# Patient Record
Sex: FEMALE | Race: BLACK OR AFRICAN AMERICAN | Employment: OTHER | URBAN - METROPOLITAN AREA
[De-identification: names, ages, dates, MRNs, and addresses within clinical notes are randomized per-mention and may not be internally consistent; named-entity substitution may affect disease eponyms.]

---

## 2019-02-14 ENCOUNTER — HOSPITAL ENCOUNTER (EMERGENCY)
Age: 74
Discharge: HOME | End: 2019-02-14
Attending: EMERGENCY MEDICINE
Payer: COMMERCIAL

## 2019-02-14 ENCOUNTER — APPOINTMENT (OUTPATIENT)
Dept: GENERAL RADIOLOGY | Age: 74
End: 2019-02-14
Payer: COMMERCIAL

## 2019-02-14 VITALS
HEART RATE: 97 BPM | DIASTOLIC BLOOD PRESSURE: 89 MMHG | TEMPERATURE: 97 F | SYSTOLIC BLOOD PRESSURE: 154 MMHG | RESPIRATION RATE: 18 BRPM | OXYGEN SATURATION: 100 %

## 2019-02-14 DIAGNOSIS — S92.502A CLOSED FRACTURE OF PHALANX OF LEFT FIFTH TOE, INITIAL ENCOUNTER: Primary | ICD-10-CM

## 2019-02-14 PROCEDURE — 99283 EMERGENCY DEPT VISIT LOW MDM: CPT

## 2019-02-14 PROCEDURE — 73630 X-RAY EXAM OF FOOT: CPT

## 2019-02-14 RX ORDER — ACETAMINOPHEN 325 MG/1
650 TABLET ORAL EVERY 6 HOURS PRN
Qty: 30 TABLET | Refills: 0 | Status: SHIPPED | OUTPATIENT
Start: 2019-02-14 | End: 2021-02-18

## 2019-02-14 RX ORDER — METOPROLOL TARTRATE 50 MG/1
50 TABLET, FILM COATED ORAL 2 TIMES DAILY
COMMUNITY
End: 2020-07-22

## 2019-02-14 ASSESSMENT — ENCOUNTER SYMPTOMS
NAUSEA: 0
COLOR CHANGE: 0
WHEEZING: 0
COUGH: 0
ABDOMINAL PAIN: 0
VOMITING: 0

## 2019-02-14 ASSESSMENT — PAIN DESCRIPTION - DESCRIPTORS: DESCRIPTORS: ACHING

## 2019-02-14 ASSESSMENT — PAIN DESCRIPTION - LOCATION: LOCATION: FOOT

## 2019-02-14 ASSESSMENT — PAIN DESCRIPTION - ONSET: ONSET: ON-GOING

## 2019-02-14 ASSESSMENT — PAIN DESCRIPTION - FREQUENCY: FREQUENCY: CONTINUOUS

## 2019-02-14 ASSESSMENT — PAIN SCALES - GENERAL
PAINLEVEL_OUTOF10: 10
PAINLEVEL_OUTOF10: 10

## 2019-02-14 ASSESSMENT — PAIN DESCRIPTION - PROGRESSION: CLINICAL_PROGRESSION: NOT CHANGED

## 2019-02-14 ASSESSMENT — PAIN DESCRIPTION - PAIN TYPE: TYPE: ACUTE PAIN

## 2019-02-14 ASSESSMENT — PAIN DESCRIPTION - ORIENTATION: ORIENTATION: RIGHT

## 2019-09-13 ENCOUNTER — APPOINTMENT (OUTPATIENT)
Dept: GENERAL RADIOLOGY | Age: 74
DRG: 310 | End: 2019-09-13
Payer: MEDICARE

## 2019-09-13 ENCOUNTER — HOSPITAL ENCOUNTER (INPATIENT)
Age: 74
LOS: 1 days | Discharge: HOME | DRG: 310 | End: 2019-09-14
Attending: EMERGENCY MEDICINE | Admitting: INTERNAL MEDICINE
Payer: MEDICARE

## 2019-09-13 PROBLEM — I47.1 SVT (SUPRAVENTRICULAR TACHYCARDIA) (HCC): Status: ACTIVE | Noted: 2019-09-13

## 2019-09-13 PROBLEM — I47.10 SVT (SUPRAVENTRICULAR TACHYCARDIA): Status: ACTIVE | Noted: 2019-09-13

## 2019-09-13 LAB
ABSOLUTE EOS #: 0.3 K/UL (ref 0–0.4)
ABSOLUTE IMMATURE GRANULOCYTE: ABNORMAL K/UL (ref 0–0.3)
ABSOLUTE LYMPH #: 1.7 K/UL (ref 1–4.8)
ABSOLUTE MONO #: 0.3 K/UL (ref 0.1–1.3)
ANION GAP SERPL CALCULATED.3IONS-SCNC: 17 MMOL/L (ref 9–17)
BASOPHILS # BLD: 1 % (ref 0–2)
BASOPHILS ABSOLUTE: 0.1 K/UL (ref 0–0.2)
BUN BLDV-MCNC: 28 MG/DL (ref 8–23)
BUN/CREAT BLD: ABNORMAL (ref 9–20)
CALCIUM SERPL-MCNC: 9.2 MG/DL (ref 8.6–10.4)
CHLORIDE BLD-SCNC: 103 MMOL/L (ref 98–107)
CO2: 19 MMOL/L (ref 20–31)
CREAT SERPL-MCNC: 1.47 MG/DL (ref 0.5–0.9)
DIFFERENTIAL TYPE: ABNORMAL
EOSINOPHILS RELATIVE PERCENT: 7 % (ref 0–4)
GFR AFRICAN AMERICAN: 42 ML/MIN
GFR NON-AFRICAN AMERICAN: 35 ML/MIN
GFR SERPL CREATININE-BSD FRML MDRD: ABNORMAL ML/MIN/{1.73_M2}
GFR SERPL CREATININE-BSD FRML MDRD: ABNORMAL ML/MIN/{1.73_M2}
GLUCOSE BLD-MCNC: 150 MG/DL (ref 70–99)
HCT VFR BLD CALC: 36.4 % (ref 36–46)
HEMOGLOBIN: 12.4 G/DL (ref 12–16)
IMMATURE GRANULOCYTES: ABNORMAL %
LYMPHOCYTES # BLD: 40 % (ref 24–44)
MAGNESIUM: 1.8 MG/DL (ref 1.6–2.6)
MCH RBC QN AUTO: 30.5 PG (ref 26–34)
MCHC RBC AUTO-ENTMCNC: 34.2 G/DL (ref 31–37)
MCV RBC AUTO: 89.1 FL (ref 80–100)
MONOCYTES # BLD: 7 % (ref 1–7)
NRBC AUTOMATED: ABNORMAL PER 100 WBC
PARTIAL THROMBOPLASTIN TIME: 20.8 SEC (ref 24–36)
PDW BLD-RTO: 14.1 % (ref 11.5–14.9)
PLATELET # BLD: 214 K/UL (ref 150–450)
PLATELET ESTIMATE: ABNORMAL
PMV BLD AUTO: 9.1 FL (ref 6–12)
POTASSIUM SERPL-SCNC: 3.4 MMOL/L (ref 3.7–5.3)
RBC # BLD: 4.08 M/UL (ref 4–5.2)
RBC # BLD: ABNORMAL 10*6/UL
SEG NEUTROPHILS: 45 % (ref 36–66)
SEGMENTED NEUTROPHILS ABSOLUTE COUNT: 2 K/UL (ref 1.3–9.1)
SODIUM BLD-SCNC: 139 MMOL/L (ref 135–144)
TROPONIN INTERP: ABNORMAL
TROPONIN INTERP: ABNORMAL
TROPONIN T: ABNORMAL NG/ML
TROPONIN T: ABNORMAL NG/ML
TROPONIN, HIGH SENSITIVITY: 156 NG/L (ref 0–14)
TROPONIN, HIGH SENSITIVITY: 19 NG/L (ref 0–14)
WBC # BLD: 4.3 K/UL (ref 3.5–11)
WBC # BLD: ABNORMAL 10*3/UL

## 2019-09-13 PROCEDURE — 83735 ASSAY OF MAGNESIUM: CPT

## 2019-09-13 PROCEDURE — 80048 BASIC METABOLIC PNL TOTAL CA: CPT

## 2019-09-13 PROCEDURE — 2060000000 HC ICU INTERMEDIATE R&B

## 2019-09-13 PROCEDURE — 6360000002 HC RX W HCPCS: Performed by: STUDENT IN AN ORGANIZED HEALTH CARE EDUCATION/TRAINING PROGRAM

## 2019-09-13 PROCEDURE — 85025 COMPLETE CBC W/AUTO DIFF WBC: CPT

## 2019-09-13 PROCEDURE — 99285 EMERGENCY DEPT VISIT HI MDM: CPT

## 2019-09-13 PROCEDURE — 71046 X-RAY EXAM CHEST 2 VIEWS: CPT

## 2019-09-13 PROCEDURE — 96366 THER/PROPH/DIAG IV INF ADDON: CPT

## 2019-09-13 PROCEDURE — 2580000003 HC RX 258: Performed by: STUDENT IN AN ORGANIZED HEALTH CARE EDUCATION/TRAINING PROGRAM

## 2019-09-13 PROCEDURE — 36415 COLL VENOUS BLD VENIPUNCTURE: CPT

## 2019-09-13 PROCEDURE — 96365 THER/PROPH/DIAG IV INF INIT: CPT

## 2019-09-13 PROCEDURE — 84484 ASSAY OF TROPONIN QUANT: CPT

## 2019-09-13 PROCEDURE — 93005 ELECTROCARDIOGRAM TRACING: CPT | Performed by: STUDENT IN AN ORGANIZED HEALTH CARE EDUCATION/TRAINING PROGRAM

## 2019-09-13 PROCEDURE — 85730 THROMBOPLASTIN TIME PARTIAL: CPT

## 2019-09-13 RX ORDER — HEPARIN SODIUM 1000 [USP'U]/ML
2000 INJECTION, SOLUTION INTRAVENOUS; SUBCUTANEOUS PRN
Status: DISCONTINUED | OUTPATIENT
Start: 2019-09-13 | End: 2019-09-14 | Stop reason: HOSPADM

## 2019-09-13 RX ORDER — HEPARIN SODIUM 1000 [USP'U]/ML
4000 INJECTION, SOLUTION INTRAVENOUS; SUBCUTANEOUS PRN
Status: DISCONTINUED | OUTPATIENT
Start: 2019-09-13 | End: 2019-09-14 | Stop reason: HOSPADM

## 2019-09-13 RX ORDER — HEPARIN SODIUM 10000 [USP'U]/100ML
12 INJECTION, SOLUTION INTRAVENOUS CONTINUOUS
Status: DISCONTINUED | OUTPATIENT
Start: 2019-09-13 | End: 2019-09-14 | Stop reason: HOSPADM

## 2019-09-13 RX ORDER — HEPARIN SODIUM 1000 [USP'U]/ML
4000 INJECTION, SOLUTION INTRAVENOUS; SUBCUTANEOUS ONCE
Status: COMPLETED | OUTPATIENT
Start: 2019-09-13 | End: 2019-09-13

## 2019-09-13 RX ORDER — 0.9 % SODIUM CHLORIDE 0.9 %
1000 INTRAVENOUS SOLUTION INTRAVENOUS ONCE
Status: COMPLETED | OUTPATIENT
Start: 2019-09-13 | End: 2019-09-13

## 2019-09-13 RX ADMIN — SODIUM CHLORIDE 1000 ML: 9 INJECTION, SOLUTION INTRAVENOUS at 18:49

## 2019-09-13 RX ADMIN — HEPARIN SODIUM AND DEXTROSE 12 UNITS/KG/HR: 10000; 5 INJECTION INTRAVENOUS at 23:00

## 2019-09-13 RX ADMIN — HEPARIN SODIUM 4000 UNITS: 1000 INJECTION, SOLUTION INTRAVENOUS; SUBCUTANEOUS at 22:59

## 2019-09-13 ASSESSMENT — ENCOUNTER SYMPTOMS
NAUSEA: 0
SORE THROAT: 0
SHORTNESS OF BREATH: 1
ABDOMINAL PAIN: 0
BACK PAIN: 0
VOMITING: 0

## 2019-09-13 NOTE — ED NOTES
Report given to Madisyn Harris RN from ER. Report method in person   The following was reviewed with receiving RN:   Current vital signs:  /76   Pulse 84   Temp 97.5 °F (36.4 °C)   Resp 16   Ht 5' 11\" (1.803 m)   Wt 180 lb (81.6 kg)   SpO2 95%   BMI 25.10 kg/m²                      Any medication or safety alerts were reviewed. Any pending diagnostics and notifications were also reviewed, as well as any safety concerns or issues, abnormal labs, abnormal imaging, and abnormal assessment findings. Questions were answered.            Dexter White RN  09/13/19 4165

## 2019-09-13 NOTE — ED PROVIDER NOTES
status: Never Smoker    Smokeless tobacco: Never Used   Substance and Sexual Activity    Alcohol use: Not on file    Drug use: Not on file    Sexual activity: Not on file   Lifestyle    Physical activity:     Days per week: Not on file     Minutes per session: Not on file    Stress: Not on file   Relationships    Social connections:     Talks on phone: Not on file     Gets together: Not on file     Attends Congregation service: Not on file     Active member of club or organization: Not on file     Attends meetings of clubs or organizations: Not on file     Relationship status: Not on file    Intimate partner violence:     Fear of current or ex partner: Not on file     Emotionally abused: Not on file     Physically abused: Not on file     Forced sexual activity: Not on file   Other Topics Concern    Not on file   Social History Narrative    Not on file       History reviewed. No pertinent family history. Allergies:  Patient has no known allergies. Home Medications:  Prior to Admission medications    Medication Sig Start Date End Date Taking? Authorizing Provider   metoprolol tartrate (LOPRESSOR) 50 MG tablet Take 50 mg by mouth 2 times daily    Historical Provider, MD   acetaminophen (TYLENOL) 325 MG tablet Take 2 tablets by mouth every 6 hours as needed for Pain 2/14/19   Dasha Nicolas PA-C       REVIEW OFSYSTEMS    (2-9 systems for level 4, 10 or more for level 5)      Review of Systems   Constitutional: Positive for diaphoresis. Negative for chills and fever. HENT: Negative for congestion and sore throat. Respiratory: Positive for shortness of breath. Cardiovascular: Negative for chest pain. Gastrointestinal: Negative for abdominal pain, nausea and vomiting. Genitourinary: Negative for dysuria. Musculoskeletal: Negative for back pain and neck pain. Neurological: Positive for light-headedness. Hematological: Negative for adenopathy. Does not bruise/bleed easily.        PHYSICAL EXAM (up to 7 for level 4, 8 or more forlevel 5)      INITIAL VITALS:   ED Triage Vitals [09/13/19 1823]   BP Temp Temp src Pulse Resp SpO2 Height Weight   129/76 97.5 °F (36.4 °C) -- 84 16 95 % 5' 11\" (1.803 m) 180 lb (81.6 kg)       Physical Exam   Constitutional: She is oriented to person, place, and time. She appears well-developed and well-nourished. No distress. HENT:   Head: Normocephalic and atraumatic. Mouth/Throat: Oropharynx is clear and moist.   Eyes: Pupils are equal, round, and reactive to light. Conjunctivae and EOM are normal.   Neck: Neck supple. Cardiovascular: Normal rate, regular rhythm and intact distal pulses. Exam reveals no gallop and no friction rub. No murmur heard. Pulmonary/Chest: Effort normal and breath sounds normal. No stridor. No respiratory distress. Abdominal: Soft. She exhibits no distension. There is no tenderness. There is no guarding. Musculoskeletal:   No lower extremity edema   Neurological: She is alert and oriented to person, place, and time. 5/5 strength upper and lower extremities bilaterally, no sensory deficits, CN III-VII, XII intact   Skin: Skin is warm. She is not diaphoretic.        DIFFERENTIAL  DIAGNOSIS     PLAN (LABS / IMAGING / EKG):  Orders Placed This Encounter   Procedures    XR CHEST STANDARD (2 VW)    CBC Auto Differential    Basic Metabolic Panel    Magnesium    Troponin    APTT    Inpatient consult to Cardiology    Inpatient consult to Internal Medicine    EKG 12 Lead    EKG 12 Lead    PATIENT STATUS (FROM ED OR OR/PROCEDURAL) Inpatient    PATIENT STATUS (FROM ED OR OR/PROCEDURAL) Inpatient       MEDICATIONS ORDERED:  Orders Placed This Encounter   Medications    0.9 % sodium chloride bolus    heparin (porcine) injection 4,000 Units    heparin (porcine) injection 4,000 Units    heparin (porcine) injection 2,000 Units    heparin 25,000 units in dextrose 5% 250 mL infusion       DDX: ACS, electrolyte abnormality,    Initial MDM/Plan: 69 y.o. female who presents with lightheadedness, nausea and diaphoresis. The patient was found to be in SVT by EMS. EMS administered a total of 18 mg adenosine. Patient arrived with stable vital signs. She was in normal sinus rhythm. On physical exam, lungs were clear to auscultation bilaterally. No focal motor or sensory deficits. Her abdomen is soft and nontender. Plan for serial troponins, EKG, chest x-ray and CBC/BMP. DIAGNOSTIC RESULTS / EMERGENCYDEPARTMENT COURSE / MDM     LABS:  Labs Reviewed   CBC WITH AUTO DIFFERENTIAL - Abnormal; Notable for the following components:       Result Value    Eosinophils % 7 (*)     All other components within normal limits   BASIC METABOLIC PANEL - Abnormal; Notable for the following components:    Glucose 150 (*)     BUN 28 (*)     CREATININE 1.47 (*)     Potassium 3.4 (*)     CO2 19 (*)     GFR Non- 35 (*)     GFR  42 (*)     All other components within normal limits   TROPONIN - Abnormal; Notable for the following components:    Troponin, High Sensitivity 19 (*)     All other components within normal limits   TROPONIN - Abnormal; Notable for the following components:    Troponin, High Sensitivity 156 (*)     All other components within normal limits   APTT - Abnormal; Notable for the following components:    PTT 20.8 (*)     All other components within normal limits   MAGNESIUM   APTT   APTT         RADIOLOGY:  Xr Chest Standard (2 Vw)    Result Date: 9/13/2019  EXAMINATION: TWO XRAY VIEWS OF THE CHEST 9/13/2019 7:39 pm COMPARISON: None available HISTORY: ORDERING SYSTEM PROVIDED HISTORY: SVT TECHNOLOGIST PROVIDED HISTORY: SVT Reason for Exam: Pt states heart racing, dizziness and blurred vision that started about 1 hour ago. Pt states h/o SVT. Acuity: Acute Type of Exam: Initial Additional signs and symptoms: Pt states heart racing, dizziness and blurred vision that started about 1 hour ago. Pt states h/o SVT.  FINDINGS:

## 2019-09-13 NOTE — ED NOTES
Pt arrives to ED via EMS. EMS states upon arrival pt was hypotension and confused. EKG revealed SVT per EMS. 6mg of adenosine was administered with no change in EKG, 12mg of adenosine was given and pt converted to NSR. Upon arrival to ED, pt states she is feeling fine and has no complaints. Pt states she has had an episode of SVT before and has seen a cardiologist for it. Pt is A&Ox4, eupneic, PWD. GCS=15. Call light in reach. Pt attached to cardiac monitor and continuous pulse oximetry.       Zofia Craig RN  09/13/19 7948

## 2019-09-13 NOTE — ED NOTES
Bed: 09  Expected date:   Expected time:   Means of arrival:   Comments:   [address], RN  09/13/19 Yvette Mas

## 2019-09-14 ENCOUNTER — APPOINTMENT (OUTPATIENT)
Dept: NUCLEAR MEDICINE | Age: 74
DRG: 310 | End: 2019-09-14
Payer: MEDICARE

## 2019-09-14 VITALS
HEART RATE: 68 BPM | RESPIRATION RATE: 14 BRPM | SYSTOLIC BLOOD PRESSURE: 169 MMHG | TEMPERATURE: 97.3 F | BODY MASS INDEX: 25.2 KG/M2 | HEIGHT: 71 IN | WEIGHT: 180 LBS | OXYGEN SATURATION: 96 % | DIASTOLIC BLOOD PRESSURE: 90 MMHG

## 2019-09-14 LAB
ANION GAP SERPL CALCULATED.3IONS-SCNC: 10 MMOL/L (ref 9–17)
ANTI-XA UNFRAC HEPARIN: 0.57 IU/L (ref 0.3–0.7)
ANTI-XA UNFRAC HEPARIN: 0.57 IU/L (ref 0.3–0.7)
BUN BLDV-MCNC: 23 MG/DL (ref 8–23)
BUN/CREAT BLD: ABNORMAL (ref 9–20)
CALCIUM SERPL-MCNC: 8.7 MG/DL (ref 8.6–10.4)
CHLORIDE BLD-SCNC: 109 MMOL/L (ref 98–107)
CO2: 24 MMOL/L (ref 20–31)
CREAT SERPL-MCNC: 1.04 MG/DL (ref 0.5–0.9)
GFR AFRICAN AMERICAN: >60 ML/MIN
GFR NON-AFRICAN AMERICAN: 52 ML/MIN
GFR SERPL CREATININE-BSD FRML MDRD: ABNORMAL ML/MIN/{1.73_M2}
GFR SERPL CREATININE-BSD FRML MDRD: ABNORMAL ML/MIN/{1.73_M2}
GLUCOSE BLD-MCNC: 110 MG/DL (ref 70–99)
LV EF: 47 %
LV EF: 55 %
LVEF MODALITY: NORMAL
LVEF MODALITY: NORMAL
MAGNESIUM: 2 MG/DL (ref 1.6–2.6)
PARTIAL THROMBOPLASTIN TIME: 112.7 SEC (ref 24–36)
PARTIAL THROMBOPLASTIN TIME: 136.1 SEC (ref 24–36)
POTASSIUM SERPL-SCNC: 4 MMOL/L (ref 3.7–5.3)
SODIUM BLD-SCNC: 143 MMOL/L (ref 135–144)
TROPONIN INTERP: ABNORMAL
TROPONIN T: ABNORMAL NG/ML
TROPONIN, HIGH SENSITIVITY: 227 NG/L (ref 0–14)

## 2019-09-14 PROCEDURE — 2580000003 HC RX 258: Performed by: INTERNAL MEDICINE

## 2019-09-14 PROCEDURE — 93017 CV STRESS TEST TRACING ONLY: CPT

## 2019-09-14 PROCEDURE — 6360000002 HC RX W HCPCS: Performed by: INTERNAL MEDICINE

## 2019-09-14 PROCEDURE — 3430000000 HC RX DIAGNOSTIC RADIOPHARMACEUTICAL: Performed by: INTERNAL MEDICINE

## 2019-09-14 PROCEDURE — 78452 HT MUSCLE IMAGE SPECT MULT: CPT

## 2019-09-14 PROCEDURE — 6370000000 HC RX 637 (ALT 250 FOR IP): Performed by: INTERNAL MEDICINE

## 2019-09-14 PROCEDURE — 93306 TTE W/DOPPLER COMPLETE: CPT

## 2019-09-14 PROCEDURE — 83735 ASSAY OF MAGNESIUM: CPT

## 2019-09-14 PROCEDURE — 99220 PR INITIAL OBSERVATION CARE/DAY 70 MINUTES: CPT | Performed by: INTERNAL MEDICINE

## 2019-09-14 PROCEDURE — 85520 HEPARIN ASSAY: CPT

## 2019-09-14 PROCEDURE — A9500 TC99M SESTAMIBI: HCPCS | Performed by: INTERNAL MEDICINE

## 2019-09-14 PROCEDURE — 36415 COLL VENOUS BLD VENIPUNCTURE: CPT

## 2019-09-14 PROCEDURE — 84484 ASSAY OF TROPONIN QUANT: CPT

## 2019-09-14 PROCEDURE — 80048 BASIC METABOLIC PNL TOTAL CA: CPT

## 2019-09-14 PROCEDURE — 85730 THROMBOPLASTIN TIME PARTIAL: CPT

## 2019-09-14 RX ORDER — AMINOPHYLLINE DIHYDRATE 25 MG/ML
50 INJECTION, SOLUTION INTRAVENOUS PRN
Status: ACTIVE | OUTPATIENT
Start: 2019-09-14 | End: 2019-09-14

## 2019-09-14 RX ORDER — SODIUM CHLORIDE 0.9 % (FLUSH) 0.9 %
10 SYRINGE (ML) INJECTION EVERY 12 HOURS SCHEDULED
Status: DISCONTINUED | OUTPATIENT
Start: 2019-09-14 | End: 2019-09-14 | Stop reason: HOSPADM

## 2019-09-14 RX ORDER — SODIUM CHLORIDE 0.9 % (FLUSH) 0.9 %
10 SYRINGE (ML) INJECTION PRN
Status: DISCONTINUED | OUTPATIENT
Start: 2019-09-14 | End: 2019-09-14 | Stop reason: HOSPADM

## 2019-09-14 RX ORDER — SODIUM CHLORIDE 9 MG/ML
500 INJECTION, SOLUTION INTRAVENOUS CONTINUOUS PRN
Status: ACTIVE | OUTPATIENT
Start: 2019-09-14 | End: 2019-09-14

## 2019-09-14 RX ORDER — POTASSIUM CHLORIDE 20 MEQ/1
40 TABLET, EXTENDED RELEASE ORAL PRN
Status: DISCONTINUED | OUTPATIENT
Start: 2019-09-14 | End: 2019-09-14 | Stop reason: HOSPADM

## 2019-09-14 RX ORDER — NITROGLYCERIN 0.4 MG/1
0.4 TABLET SUBLINGUAL EVERY 5 MIN PRN
Status: ACTIVE | OUTPATIENT
Start: 2019-09-14 | End: 2019-09-14

## 2019-09-14 RX ORDER — METOPROLOL TARTRATE 5 MG/5ML
5 INJECTION INTRAVENOUS EVERY 5 MIN PRN
Status: ACTIVE | OUTPATIENT
Start: 2019-09-14 | End: 2019-09-14

## 2019-09-14 RX ORDER — SODIUM CHLORIDE 9 MG/ML
INJECTION, SOLUTION INTRAVENOUS CONTINUOUS
Status: DISCONTINUED | OUTPATIENT
Start: 2019-09-14 | End: 2019-09-14 | Stop reason: HOSPADM

## 2019-09-14 RX ORDER — ALBUTEROL SULFATE 90 UG/1
2 AEROSOL, METERED RESPIRATORY (INHALATION) PRN
Status: ACTIVE | OUTPATIENT
Start: 2019-09-14 | End: 2019-09-14

## 2019-09-14 RX ORDER — SODIUM CHLORIDE 0.9 % (FLUSH) 0.9 %
10 SYRINGE (ML) INJECTION PRN
Status: ACTIVE | OUTPATIENT
Start: 2019-09-14 | End: 2019-09-14

## 2019-09-14 RX ORDER — MAGNESIUM SULFATE 1 G/100ML
1 INJECTION INTRAVENOUS PRN
Status: DISCONTINUED | OUTPATIENT
Start: 2019-09-14 | End: 2019-09-14 | Stop reason: HOSPADM

## 2019-09-14 RX ORDER — ATROPINE SULFATE 0.1 MG/ML
0.5 INJECTION INTRAVENOUS EVERY 5 MIN PRN
Status: ACTIVE | OUTPATIENT
Start: 2019-09-14 | End: 2019-09-14

## 2019-09-14 RX ORDER — ACETAMINOPHEN 325 MG/1
650 TABLET ORAL EVERY 6 HOURS PRN
Status: DISCONTINUED | OUTPATIENT
Start: 2019-09-14 | End: 2019-09-14 | Stop reason: HOSPADM

## 2019-09-14 RX ORDER — POTASSIUM CHLORIDE 7.45 MG/ML
10 INJECTION INTRAVENOUS PRN
Status: DISCONTINUED | OUTPATIENT
Start: 2019-09-14 | End: 2019-09-14 | Stop reason: HOSPADM

## 2019-09-14 RX ORDER — ACETAMINOPHEN 325 MG/1
650 TABLET ORAL EVERY 4 HOURS PRN
Status: DISCONTINUED | OUTPATIENT
Start: 2019-09-14 | End: 2019-09-14 | Stop reason: HOSPADM

## 2019-09-14 RX ORDER — METOPROLOL TARTRATE 50 MG/1
50 TABLET, FILM COATED ORAL 2 TIMES DAILY
Status: DISCONTINUED | OUTPATIENT
Start: 2019-09-14 | End: 2019-09-14 | Stop reason: HOSPADM

## 2019-09-14 RX ADMIN — Medication 10 ML: at 08:45

## 2019-09-14 RX ADMIN — TETRAKIS(2-METHOXYISOBUTYLISOCYANIDE)COPPER(I) TETRAFLUOROBORATE 11.2 MILLICURIE: 1 INJECTION, POWDER, LYOPHILIZED, FOR SOLUTION INTRAVENOUS at 08:45

## 2019-09-14 RX ADMIN — REGADENOSON 0.4 MG: 0.08 INJECTION, SOLUTION INTRAVENOUS at 10:18

## 2019-09-14 RX ADMIN — SODIUM CHLORIDE: 9 INJECTION, SOLUTION INTRAVENOUS at 02:03

## 2019-09-14 RX ADMIN — METOPROLOL TARTRATE 50 MG: 50 TABLET ORAL at 11:31

## 2019-09-14 RX ADMIN — Medication 10 ML: at 10:19

## 2019-09-14 RX ADMIN — TETRAKIS(2-METHOXYISOBUTYLISOCYANIDE)COPPER(I) TETRAFLUOROBORATE 45 MILLICURIE: 1 INJECTION, POWDER, LYOPHILIZED, FOR SOLUTION INTRAVENOUS at 10:20

## 2019-09-14 RX ADMIN — Medication 10 ML: at 09:56

## 2019-09-14 ASSESSMENT — PAIN SCALES - GENERAL: PAINLEVEL_OUTOF10: 0

## 2019-09-14 NOTE — PROGRESS NOTES
PAGED DR WEBB FROM STRESS LAB ABOUT PATIENTS ELEVATED TROPONINS -521, HE STATED IT WAS OK TO PROCEED WITH LEXISCAN SINCE I SPOKE WITH PATIENTS ZACHARY YUEN ON PCUC AND STATED THE PATIENT HAD NO EKG CHANGES AND HAD NO CHEST PAIN AND NO SHORTNESS OF BREATH. Marco Antonio Zamora, [facility] ALSO SPOKE WITH DR JON AND STATED THAT HE WAS AWARE OF ELEVATED TROPS AND OK TO PROCEED.

## 2019-09-14 NOTE — PROGRESS NOTES
PATIENT BROUGHT TO STRESS LAB FROM [facility], PLACED ON EKG AND VITALS MACHINE, IV FLUSHED WITH NORMAL SALINE, LEXISCAN EXPLAINED AND CONSENT SIGNED. PATIENT DENIES CHEST PAIN AND SHORTNESS OF BREATH.  /91, HR 55, PATIENT IS SINUS BRADYCARDIA WITH LONG QT NOTED ON EKG

## 2019-09-14 NOTE — PROGRESS NOTES
PATIENT RETURNED TO BASELINE, /91, HR 78. LEXISCAN ENDED, AND PATIENT TAKEN TO HAVE ECHO DONE AND THEN TO NUC MED FOR FINAL STRESS SCAN.

## 2019-09-14 NOTE — PROGRESS NOTES
LEXISCAN COMPLETED, PATIENT STATES SHE IS ASYMPTOMATIC, /80, HR 89, CAFFEINE GIVEN AFTER ONE MINUTE.

## 2019-09-14 NOTE — DISCHARGE INSTR - DIET

## 2019-09-14 NOTE — CONSULTS
[facility] Cardiology  Attending Consult Note    Reason for Consult:  SVT  Requesting Physician:  Andrew Basurto MD     CHIEF COMPLAINT:  Palpitations    History of Present Illness:  Lety Castaneda is a 69 y.o. patient who presented to the hospital with the above chief complaint. She has known history of SVT and has been asymptomatic for a few years  Yesterday she had sudden onset of rapid rate, felt like her her heart was jumping out of her chest. She was dizzy with blurred vision but denies chest pain, shortness of breath or dizziness  She presented to the ER and was diagnosed with SVT that termintaed with adenosine use  She has been asymptomatic since  Troponin did rise overnight though  Denies prior diagnosis of CAD    Past Medical History:  Past Medical History:   Diagnosis Date    SVT (supraventricular tachycardia) ([facility] [address] 2009       Surgical History:   has no past surgical history on file. Social History:   reports that she has never smoked. She has never used smokeless tobacco.     Family History:  family history is not on file. Home Medications:  Prior to Admission medications    Medication Sig Start Date End Date Taking?  Authorizing Provider   metoprolol tartrate (LOPRESSOR) 50 MG tablet Take 50 mg by mouth 2 times daily   Yes Historical Provider, MD   acetaminophen (TYLENOL) 325 MG tablet Take 2 tablets by mouth every 6 hours as needed for Pain 2/14/19   Zia Newman PA-C        Current Medications:  Scheduled Meds:   sodium chloride flush  10 mL Intravenous 2 times per day    metoprolol tartrate  50 mg Oral BID     Continuous Infusions:   sodium chloride 75 mL/hr at 09/14/19 0203    sodium chloride      heparin (porcine) 12 Units/kg/hr (09/13/19 2300)     PRN Meds:.sodium chloride flush, acetaminophen, magnesium sulfate, potassium chloride **OR** potassium alternative oral replacement **OR** potassium chloride, acetaminophen, perflutren lipid microspheres, regadenoson, sodium chloride flush, sodium chloride, albuterol sulfate HFA, atropine, nitroGLYCERIN, metoprolol, aminophylline, sodium chloride flush, heparin (porcine), heparin (porcine)    Allergies:  Patient has no known allergies. Review of Systems:   All 12 point review of symptoms completed. Pertinent positives identified in the HPI, all other review of symptoms negative   Physical Examination:    Vitals:    09/14/19 0801   BP: (!) 144/84   Pulse: 71   Resp: 16   Temp: 98.1 °F (36.7 °C)   SpO2: 100%       General: Alert, cooperative, no distress, appears stated age  Head:  Atraumatic, PERRL. Lips, mucosa, and tongue normal  Neck:  Supple, symmetric, no carotid bruit, no JVD  Lungs: Clear to auscultation bilaterally, respirations unlabored  Heart[de-identified]  Regular rate and rhythm, S1, S2 normal, no murmur, rub or gallop  Abdomen: Soft, non-tender, bowel sounds active all four quadrants  Extremities: Extremities normal, atraumatic, no cyanosis or edema  Pulses: 2+ and symmetric  Skin:  Skin color, texture, turgor normal, no rashes or lesions  Neurologic: Alert and oriented x 3, normal gross motor and sensory exam.     Labs  CBC:  Recent Labs     09/13/19 1825   WBC 4.3   HGB 12.4   HCT 36.4        Magnesium:  Recent Labs     09/13/19 1825 09/14/19  0436   MG 1.8 2.0     BMP:  Recent Labs     09/13/19 1825 09/14/19  0436    143   K 3.4* 4.0   CALCIUM 9.2 8.7   CO2 19* 24   BUN 28* 23   CREATININE 1.47* 1.04*   LABGLOM 35* 52*   GLUCOSE 150* 110*     BNP:No results for input(s): BNP in the last 72 hours. PT/INR:No results for input(s): PROTIME, INR in the last 72 hours. APTT:  Recent Labs     09/13/19 2158 09/14/19  0436   APTT 20.8* 136.1*     CARDIAC ENZYMES:  Recent Labs     09/13/19  1825 09/13/19  2040 09/14/19  0436   TROPHS 19* 156* 227*       EKG:  Sinus rhythm now - rhythm strips showing SVT not available for review at this time    Echo: Pending    Stress test: Pending      Assessment  1.  Supraventricular tachycardia  2. Elevated troponin    Plan:    I had the opportunity to review the clinical symptoms and presentation of Letty Olivera. · She was in SVT for over an hour prior to reverting to sinus rhythm  · She was asymptomatic prior to and since then  · No chest pain or ischemic EKG changes noted  · Will check an echo  · Also will check a lexiscan stress test for risk stratification   · If both echo and stress are unremarkable, will be OK to discharge and follow with Dr. Dawna Monroe as outpatient    All questions and concerns were addressed to the patient/family. Alternatives to my treatment were discussed. The note was completed using EMR. Every effort was made to ensure accuracy; however, inadvertent computerized transcription errors may be present. Thank you for allowing to us to participate in the care or Letty Olivera. Further evaluation will be based upon the patient's clinical course and testing results.       Electronically signed by Skylar Ashton MD on 9/14/2019 at 9:44 AM

## 2019-09-14 NOTE — FLOWSHEET NOTE
09/14/19 1153   Encounter Summary   Services provided to: Patient   Referral/Consult From: Nurse   Support System Family members   Continue Visiting   (8-20-07)   Complexity of Encounter Moderate   Length of Encounter 15 minutes   Routine   Type Initial   Spiritual/[employer]   Type Spiritual support   Assessment Calm; Approachable   Intervention Active listening;Explored feelings, thoughts, concerns;Sustaining presence/ Ministry of presence;Provided reading materials/devotional materials; Discussed illness/injury and it's impact   Outcome Expressed gratitude;Engaged in [employer] ([facility])   Healthcare Directive No, patient does not have an advance directive for healthcare treatment   Advance Directives Documents given;Documents explained

## 2019-09-14 NOTE — PROGRESS NOTES
Salima Delay from security came to get the patients valuable. Money $204.00, 2 credit cards and apple watch.

## 2019-09-16 LAB
EKG ATRIAL RATE: 70 BPM
EKG ATRIAL RATE: 77 BPM
EKG P AXIS: 53 DEGREES
EKG P AXIS: 57 DEGREES
EKG P-R INTERVAL: 150 MS
EKG P-R INTERVAL: 152 MS
EKG Q-T INTERVAL: 450 MS
EKG Q-T INTERVAL: 472 MS
EKG QRS DURATION: 84 MS
EKG QRS DURATION: 86 MS
EKG QTC CALCULATION (BAZETT): 509 MS
EKG QTC CALCULATION (BAZETT): 509 MS
EKG R AXIS: 12 DEGREES
EKG R AXIS: 9 DEGREES
EKG T AXIS: -14 DEGREES
EKG T AXIS: -8 DEGREES
EKG VENTRICULAR RATE: 70 BPM
EKG VENTRICULAR RATE: 77 BPM

## 2019-09-16 PROCEDURE — 93010 ELECTROCARDIOGRAM REPORT: CPT | Performed by: INTERNAL MEDICINE

## 2019-09-16 NOTE — H&P
[address]      Patient name:  Talib Conception  Date of admission:  9/13/2019  6:22 PM  MRN:   [id number]  YOB: 1945    Cc palpitations       HPI   Pt admitted with sympts of palpitations         HPI   1) Location/Symptom  Palpitations   2) Timing/Onset: 1 day   3) Context/Setting: sudden onset --> rapid rate felt like heart thumping   4) Quality: no chest pain    5) Duration: episodic   6) Modifying Factors: hx of SVT years back   7) Severity: moderate     SVT terminated with adenosine in ER     Past Medical History:   Diagnosis Date    SVT (supraventricular tachycardia) ([facility] [address] 2009     History reviewed. No pertinent family history. reports that she has never smoked. She has never used smokeless tobacco.  Past Medical History:   Diagnosis Date    SVT (supraventricular tachycardia) ([facility] [address] 2009     No Known Allergies    Review of systems    Constitutional: Negative for fever and fatigue. Respiratory: Negative for cough and shortness of breath. Cardiovascular: Negative for chest pain,and leg swelling. Gastrointestinal: Negative for abdominal pain, diarrhea and blood in stool. Endocrine: Negative for cold intolerance. Genitourinary: Negative for dysuria and frequency. Musculoskeletal: Negative for back pain and arthralgias. Skin: Negative for color change and rash. Neurological: Negative for dizziness and headaches. Psychiatric/Behavioral: Negative for confusion. ROS  Physical exam     BP (!) 169/90   Pulse 68   Temp 97.3 °F (36.3 °C) (Axillary)   Resp 14   Ht 5' 11\" (1.803 m)   Wt 180 lb (81.6 kg)   SpO2 96%   BMI 25.10 kg/m²      General appearance: well nourished in no distress  Eyes:  GLENNY   Head: AT/NC  ENT NAD   Neck: Trachea midline; supple  Lungs: normal effort, clear to auscultation. CVS sinus with no murmurs.   Vasc No JVP, no carotid bruit  Abdomen: Soft, non-tender; no masses or HSM, Extremities: no edema; no digital cyanosis or clubbing. Musculoskeletal NO joint effusion or synovitis  Skin: No rash or ulcers  Neurologic: Cranial nerves II-XII grossly intact; no motor deficit. Psych: Appropriate affect, alert and oriented to person, place and time  NO lymphadenopathy            Relevant Labs/Imaging     CBC:   Recent Labs     09/13/19 1825   WBC 4.3   HGB 12.4        BMP:    Recent Labs     09/13/19  1825 09/14/19  0436    143   K 3.4* 4.0    109*   CO2 19* 24   BUN 28* 23   CREATININE 1.47* 1.04*   GLUCOSE 150* 110*     S. Calcium:  Recent Labs     09/14/19  0436   CALCIUM 8.7     Glycosylated hemoglobin A1C: No results for input(s): LABA1C in the last 72 hours. BNP:No results for input(s): BNP in the last 72 hours. Assessment / Plan      Patient Active Problem List   Diagnosis    SVT (supraventricular tachycardia) (HCC)       A/p  Pt with SVT episode terminated with adenosine   Mild elevated troponin   Stress test showed no ischemia   Echo shows EF more than 55 %     Will dc home   Plan to f/u with cardio as outpt   Cont metoprolol 50 bid     MD YANA [address], [address] [place] [address].    Phone [phone]   Fax: [phone]  Answering Service: [phone]

## 2019-09-17 NOTE — PROCEDURES
[address] [place], [place] [address]                              CARDIAC STRESS TEST    PATIENT NAME: Eva Phan                     :        1945  MED REC NO:   [id number]                              ROOM:       2110  ACCOUNT NO:   [de-identified]                           ADMIT DATE: 2019  PROVIDER:     Bogdan Palumbo    DATE OF STUDY:  2019    ORDERING PROVIDER:  RAVEN JON MD    INTERPRETING PHYSICIAN:  Radha Heredia MD    LEXISCAN STRESS TEST    INDICATION:  ELEVATED TROPONIN    INTERPRETATION:  Resting heart rate:  55 bpm.  Resting blood pressure:  141/91 mmhg. Resting EKG:  Normal.  Stress heart response:  Normal response. Blood pressure response:  Appropriate. Stress EKGs:  No changes seen. No ischemic Ekg changes. IMPRESSION:  NEGATIVE LEXISCAN STRESS TEST. THE NUCLEAR SCAN TO FOLLOW.           Teri Mitchell    D: 2019 12:38:50       T: 2019 12:40:05     MT/SHIRLENE  Job#: [id number]     Doc#: Unknown    CC:    (Retain this field even if not dictated or not decipherable)

## 2020-07-22 ENCOUNTER — HOSPITAL ENCOUNTER (OUTPATIENT)
Dept: PREADMISSION TESTING | Age: 75
Discharge: HOME | End: 2020-07-26
Payer: MEDICARE

## 2020-07-22 VITALS
HEART RATE: 70 BPM | HEIGHT: 72 IN | WEIGHT: 211.64 LBS | DIASTOLIC BLOOD PRESSURE: 77 MMHG | RESPIRATION RATE: 16 BRPM | SYSTOLIC BLOOD PRESSURE: 139 MMHG | OXYGEN SATURATION: 99 % | BODY MASS INDEX: 28.67 KG/M2 | TEMPERATURE: 97.1 F

## 2020-07-22 LAB
ANION GAP SERPL CALCULATED.3IONS-SCNC: 13 MMOL/L (ref 9–17)
BUN BLDV-MCNC: 25 MG/DL (ref 8–23)
BUN/CREAT BLD: 19 (ref 9–20)
CALCIUM SERPL-MCNC: 9.4 MG/DL (ref 8.6–10.4)
CHLORIDE BLD-SCNC: 105 MMOL/L (ref 98–107)
CO2: 24 MMOL/L (ref 20–31)
CREAT SERPL-MCNC: 1.35 MG/DL (ref 0.5–0.9)
GFR AFRICAN AMERICAN: 47 ML/MIN
GFR NON-AFRICAN AMERICAN: 38 ML/MIN
GFR SERPL CREATININE-BSD FRML MDRD: ABNORMAL ML/MIN/{1.73_M2}
GFR SERPL CREATININE-BSD FRML MDRD: ABNORMAL ML/MIN/{1.73_M2}
GLUCOSE BLD-MCNC: 106 MG/DL (ref 70–99)
HCT VFR BLD CALC: 38.1 % (ref 36.3–47.1)
HEMOGLOBIN: 11.8 G/DL (ref 11.9–15.1)
MCH RBC QN AUTO: 29.3 PG (ref 25.2–33.5)
MCHC RBC AUTO-ENTMCNC: 31 G/DL (ref 28.4–34.8)
MCV RBC AUTO: 94.5 FL (ref 82.6–102.9)
NRBC AUTOMATED: 0 PER 100 WBC
PDW BLD-RTO: 13.2 % (ref 11.8–14.4)
PLATELET # BLD: 216 K/UL (ref 138–453)
PMV BLD AUTO: 10.2 FL (ref 8.1–13.5)
POTASSIUM SERPL-SCNC: 3.9 MMOL/L (ref 3.7–5.3)
RBC # BLD: 4.03 M/UL (ref 3.95–5.11)
SODIUM BLD-SCNC: 142 MMOL/L (ref 135–144)
WBC # BLD: 4.5 K/UL (ref 3.5–11.3)

## 2020-07-22 PROCEDURE — 80048 BASIC METABOLIC PNL TOTAL CA: CPT

## 2020-07-22 PROCEDURE — 85027 COMPLETE CBC AUTOMATED: CPT

## 2020-07-22 PROCEDURE — 36415 COLL VENOUS BLD VENIPUNCTURE: CPT

## 2020-07-22 RX ORDER — ANASTROZOLE 1 MG/1
1 TABLET ORAL DAILY
COMMUNITY

## 2020-07-22 RX ORDER — IBUPROFEN 800 MG/1
800 TABLET ORAL EVERY 8 HOURS PRN
Status: ON HOLD | COMMUNITY
End: 2021-06-11 | Stop reason: HOSPADM

## 2020-07-22 RX ORDER — METOPROLOL SUCCINATE 50 MG/1
50 TABLET, EXTENDED RELEASE ORAL DAILY
COMMUNITY
Start: 2020-05-13

## 2020-07-22 ASSESSMENT — PAIN DESCRIPTION - PROGRESSION: CLINICAL_PROGRESSION: NOT CHANGED

## 2020-07-22 ASSESSMENT — PAIN DESCRIPTION - LOCATION: LOCATION: TOE (COMMENT WHICH ONE)

## 2020-07-22 ASSESSMENT — PAIN DESCRIPTION - ONSET: ONSET: ON-GOING

## 2020-07-22 ASSESSMENT — PAIN DESCRIPTION - PAIN TYPE: TYPE: CHRONIC PAIN

## 2020-07-22 ASSESSMENT — PAIN DESCRIPTION - FREQUENCY: FREQUENCY: CONTINUOUS

## 2020-07-22 ASSESSMENT — PAIN - FUNCTIONAL ASSESSMENT: PAIN_FUNCTIONAL_ASSESSMENT: PREVENTS OR INTERFERES WITH MANY ACTIVE NOT PASSIVE ACTIVITIES

## 2020-07-22 ASSESSMENT — PAIN DESCRIPTION - ORIENTATION: ORIENTATION: LEFT

## 2020-07-22 ASSESSMENT — PAIN SCALES - GENERAL: PAINLEVEL_OUTOF10: 9

## 2020-07-22 ASSESSMENT — PAIN DESCRIPTION - DESCRIPTORS: DESCRIPTORS: THROBBING

## 2020-07-22 NOTE — PRE-PROCEDURE INSTRUCTIONS
ARRIVE  [place] Wednesday, August 5,2020  [address] in [address] to front of building. Call [phone] when here. We will come to get you. No visitors at this time      Continue to take your home medications as you normally do up to and including the night before surgery with the exception of any blood thinning medications. Please stop any blood thinning medications as directed by your surgeon or prescribing physician. Failure to stop certain medications may interfere with your scheduled surgery. These may include:  Aspirin, Warfarin (Coumadin), Clopidogrel (Plavix), Ibuprofen (Motrin, Advil), Naproxen (Aleve), Meloxicam (Mobic), Celecoxib (Celebrex), Eliquis, Pradaxa, Xarelto, Effient, Fish Oil, Herbal supplements. Stop ibuprofen        Please take the following medication(s) the day of surgery with a small sip of water:  metoprolol      PREPARING FOR YOUR SURGERY:     Before surgery, you can play an important role in your own health. Because skin is not sterile, we need to be sure that your skin is as free of germs as possible before surgery by carefully washing before surgery. Preparing or prepping skin before surgery can reduce the risk of a surgical site infection.   Do not shave the area of your body where your surgery will be performed unless you received specific permission from your physician. You will need to shower at home the night before surgery and the morning of surgery with a special soap called chlorhexidine gluconate (CHG*). *Not to be used by people allergic to Chlorhexidine Gluconate (CHG). Following these instructions will help you be sure that your skin is clean before surgery. Instructions on cleaning your skin before surgery: The night before your surgery:      You will need to shower with warm water (not hot) and the CHG soap.  Use a clean wash cloth and a clean towel. Have clean clothes available to put on after the shower.      

## 2020-07-22 NOTE — PROGRESS NOTES
PAT Progress Note    Pt Name: Jerri Sanchez  MRN: [id number]  YOB: 1945  Date of evaluation: 7/22/2020      [x] Called to [facility]. I spoke to the patient, Jerri Sanchez, a 70 yo female schedule for elective surgery on 8/5/2020 @ 0945 for a derotational arthroplasty left 5th toe by Dr Lexis Varma, KIERAN.     [x] Patient Hx Paroxysmal Supraventricular Tachycardia and follows with [facility]. I reviewed the hardcopy Cardiac Clearance Note by Herb Schwartz CNP dated 7/14/20 in short chart which meets the criteria for an Interval History and Physical and will be updated on day of surgery. Hx DCIS left breast Treated with Radiation and follows with Oncologist Dr Patricia Casrtejon for ongoing care. (Refer to hardcopy Oncology note in short chart)     Functional Capacity per patient  Patient is able to walk on flat or incline surfaces and flight of stairs w/o SOB, dizziness, lightheadedness, sweating, palpitations or chest pain       Vital signs: /77   Pulse 70   Temp 97.1 °F (36.2 °C) (Oral)   Resp 16   Ht 5' 11.5\" (1.816 m)   Wt 211 lb 10.3 oz (96 kg)   SpO2 99%   BMI 29.11 kg/m²      This is a 69 y.o. female who is pleasant, cooperative, alert and oriented x3, in no acute distress. Heart: Heart sounds are normal.  HR 70 regular rate and rhythm without murmur, gallop or rub. Lungs: Normal respiratory effort with equal expansion, good air exchange, unlabored and clear to auscultation without wheezes or rales bilaterally   Abdomen: soft, nontender, nondistended with bowel sounds.        Kerrie KHANNA, ANP-BC  Electronically signed 7/22/2020 at 3:30 PM

## 2020-08-01 ENCOUNTER — HOSPITAL ENCOUNTER (OUTPATIENT)
Dept: PREADMISSION TESTING | Age: 75
Setting detail: SPECIMEN
Discharge: HOME | End: 2020-08-05
Payer: MEDICARE

## 2020-08-01 PROCEDURE — U0003 INFECTIOUS AGENT DETECTION BY NUCLEIC ACID (DNA OR RNA); SEVERE ACUTE RESPIRATORY SYNDROME CORONAVIRUS 2 (SARS-COV-2) (CORONAVIRUS DISEASE [COVID-19]), AMPLIFIED PROBE TECHNIQUE, MAKING USE OF HIGH THROUGHPUT TECHNOLOGIES AS DESCRIBED BY CMS-2020-01-R: HCPCS

## 2020-08-03 LAB — SARS-COV-2, NAA: NOT DETECTED

## 2020-08-04 ENCOUNTER — TELEPHONE (OUTPATIENT)
Dept: PRIMARY CARE CLINIC | Age: 75
End: 2020-08-04

## 2020-08-05 ENCOUNTER — ANESTHESIA (OUTPATIENT)
Dept: OPERATING ROOM | Age: 75
End: 2020-08-05
Payer: COMMERCIAL

## 2020-08-05 ENCOUNTER — HOSPITAL ENCOUNTER (OUTPATIENT)
Age: 75
Setting detail: OUTPATIENT SURGERY
Discharge: HOME | End: 2020-08-05
Attending: PODIATRIST | Admitting: PODIATRIST
Payer: COMMERCIAL

## 2020-08-05 ENCOUNTER — ANESTHESIA EVENT (OUTPATIENT)
Dept: OPERATING ROOM | Age: 75
End: 2020-08-05
Payer: COMMERCIAL

## 2020-08-05 VITALS — DIASTOLIC BLOOD PRESSURE: 73 MMHG | OXYGEN SATURATION: 96 % | TEMPERATURE: 95.2 F | SYSTOLIC BLOOD PRESSURE: 127 MMHG

## 2020-08-05 VITALS
RESPIRATION RATE: 14 BRPM | OXYGEN SATURATION: 98 % | DIASTOLIC BLOOD PRESSURE: 89 MMHG | HEART RATE: 61 BPM | SYSTOLIC BLOOD PRESSURE: 137 MMHG | TEMPERATURE: 96.8 F | HEIGHT: 72 IN | BODY MASS INDEX: 28.67 KG/M2 | WEIGHT: 211.64 LBS

## 2020-08-05 DIAGNOSIS — G89.18 ACUTE POST-OPERATIVE PAIN: Primary | ICD-10-CM

## 2020-08-05 PROCEDURE — 3700000000 HC ANESTHESIA ATTENDED CARE: Performed by: PODIATRIST

## 2020-08-05 PROCEDURE — 6360000002 HC RX W HCPCS

## 2020-08-05 PROCEDURE — 6360000002 HC RX W HCPCS: Performed by: ANESTHESIOLOGY

## 2020-08-05 PROCEDURE — 2500000003 HC RX 250 WO HCPCS: Performed by: NURSE ANESTHETIST, CERTIFIED REGISTERED

## 2020-08-05 PROCEDURE — 3600000012 HC SURGERY LEVEL 2 ADDTL 15MIN: Performed by: PODIATRIST

## 2020-08-05 PROCEDURE — 7100000010 HC PHASE II RECOVERY - FIRST 15 MIN: Performed by: PODIATRIST

## 2020-08-05 PROCEDURE — 3600000002 HC SURGERY LEVEL 2 BASE: Performed by: PODIATRIST

## 2020-08-05 PROCEDURE — 2580000003 HC RX 258: Performed by: NURSE ANESTHETIST, CERTIFIED REGISTERED

## 2020-08-05 PROCEDURE — 3700000001 HC ADD 15 MINUTES (ANESTHESIA): Performed by: PODIATRIST

## 2020-08-05 PROCEDURE — 2580000003 HC RX 258: Performed by: ANESTHESIOLOGY

## 2020-08-05 PROCEDURE — 7100000011 HC PHASE II RECOVERY - ADDTL 15 MIN: Performed by: PODIATRIST

## 2020-08-05 PROCEDURE — 2709999900 HC NON-CHARGEABLE SUPPLY: Performed by: PODIATRIST

## 2020-08-05 PROCEDURE — 6360000002 HC RX W HCPCS: Performed by: NURSE ANESTHETIST, CERTIFIED REGISTERED

## 2020-08-05 PROCEDURE — 2500000003 HC RX 250 WO HCPCS: Performed by: PODIATRIST

## 2020-08-05 RX ORDER — CEFAZOLIN SODIUM 1 G/3ML
INJECTION, POWDER, FOR SOLUTION INTRAMUSCULAR; INTRAVENOUS PRN
Status: DISCONTINUED | OUTPATIENT
Start: 2020-08-05 | End: 2020-08-05 | Stop reason: SDUPTHER

## 2020-08-05 RX ORDER — ONDANSETRON 2 MG/ML
4 INJECTION INTRAMUSCULAR; INTRAVENOUS
Status: DISCONTINUED | OUTPATIENT
Start: 2020-08-05 | End: 2020-08-05 | Stop reason: HOSPADM

## 2020-08-05 RX ORDER — PROPOFOL 10 MG/ML
INJECTION, EMULSION INTRAVENOUS CONTINUOUS PRN
Status: DISCONTINUED | OUTPATIENT
Start: 2020-08-05 | End: 2020-08-05 | Stop reason: SDUPTHER

## 2020-08-05 RX ORDER — LIDOCAINE HYDROCHLORIDE 10 MG/ML
1 INJECTION, SOLUTION EPIDURAL; INFILTRATION; INTRACAUDAL; PERINEURAL
Status: DISCONTINUED | OUTPATIENT
Start: 2020-08-05 | End: 2020-08-05 | Stop reason: HOSPADM

## 2020-08-05 RX ORDER — HYDRALAZINE HYDROCHLORIDE 20 MG/ML
5 INJECTION INTRAMUSCULAR; INTRAVENOUS EVERY 10 MIN PRN
Status: DISCONTINUED | OUTPATIENT
Start: 2020-08-05 | End: 2020-08-05 | Stop reason: HOSPADM

## 2020-08-05 RX ORDER — SODIUM CHLORIDE 0.9 % (FLUSH) 0.9 %
10 SYRINGE (ML) INJECTION EVERY 12 HOURS SCHEDULED
Status: DISCONTINUED | OUTPATIENT
Start: 2020-08-05 | End: 2020-08-05 | Stop reason: HOSPADM

## 2020-08-05 RX ORDER — FENTANYL CITRATE 50 UG/ML
50 INJECTION, SOLUTION INTRAMUSCULAR; INTRAVENOUS EVERY 5 MIN PRN
Status: DISCONTINUED | OUTPATIENT
Start: 2020-08-05 | End: 2020-08-05 | Stop reason: HOSPADM

## 2020-08-05 RX ORDER — FENTANYL CITRATE 50 UG/ML
25 INJECTION, SOLUTION INTRAMUSCULAR; INTRAVENOUS EVERY 5 MIN PRN
Status: DISCONTINUED | OUTPATIENT
Start: 2020-08-05 | End: 2020-08-05 | Stop reason: HOSPADM

## 2020-08-05 RX ORDER — SODIUM CHLORIDE, SODIUM LACTATE, POTASSIUM CHLORIDE, CALCIUM CHLORIDE 600; 310; 30; 20 MG/100ML; MG/100ML; MG/100ML; MG/100ML
INJECTION, SOLUTION INTRAVENOUS CONTINUOUS PRN
Status: DISCONTINUED | OUTPATIENT
Start: 2020-08-05 | End: 2020-08-05 | Stop reason: SDUPTHER

## 2020-08-05 RX ORDER — HYDROMORPHONE HCL 110MG/55ML
0.25 PATIENT CONTROLLED ANALGESIA SYRINGE INTRAVENOUS EVERY 5 MIN PRN
Status: DISCONTINUED | OUTPATIENT
Start: 2020-08-05 | End: 2020-08-05 | Stop reason: HOSPADM

## 2020-08-05 RX ORDER — OXYCODONE HYDROCHLORIDE AND ACETAMINOPHEN 5; 325 MG/1; MG/1
1 TABLET ORAL EVERY 6 HOURS PRN
Qty: 28 TABLET | Refills: 0 | Status: SHIPPED | OUTPATIENT
Start: 2020-08-05 | End: 2020-08-12

## 2020-08-05 RX ORDER — SODIUM CHLORIDE, SODIUM LACTATE, POTASSIUM CHLORIDE, CALCIUM CHLORIDE 600; 310; 30; 20 MG/100ML; MG/100ML; MG/100ML; MG/100ML
INJECTION, SOLUTION INTRAVENOUS CONTINUOUS
Status: DISCONTINUED | OUTPATIENT
Start: 2020-08-05 | End: 2020-08-05 | Stop reason: HOSPADM

## 2020-08-05 RX ORDER — FENTANYL CITRATE 50 UG/ML
INJECTION, SOLUTION INTRAMUSCULAR; INTRAVENOUS PRN
Status: DISCONTINUED | OUTPATIENT
Start: 2020-08-05 | End: 2020-08-05 | Stop reason: SDUPTHER

## 2020-08-05 RX ORDER — SODIUM CHLORIDE 0.9 % (FLUSH) 0.9 %
10 SYRINGE (ML) INJECTION PRN
Status: DISCONTINUED | OUTPATIENT
Start: 2020-08-05 | End: 2020-08-05 | Stop reason: HOSPADM

## 2020-08-05 RX ORDER — LIDOCAINE HYDROCHLORIDE 10 MG/ML
INJECTION, SOLUTION EPIDURAL; INFILTRATION; INTRACAUDAL; PERINEURAL PRN
Status: DISCONTINUED | OUTPATIENT
Start: 2020-08-05 | End: 2020-08-05 | Stop reason: ALTCHOICE

## 2020-08-05 RX ORDER — LIDOCAINE HYDROCHLORIDE 20 MG/ML
INJECTION, SOLUTION EPIDURAL; INFILTRATION; INTRACAUDAL; PERINEURAL PRN
Status: DISCONTINUED | OUTPATIENT
Start: 2020-08-05 | End: 2020-08-05 | Stop reason: SDUPTHER

## 2020-08-05 RX ORDER — SODIUM CHLORIDE 9 MG/ML
INJECTION, SOLUTION INTRAVENOUS CONTINUOUS
Status: DISCONTINUED | OUTPATIENT
Start: 2020-08-05 | End: 2020-08-05 | Stop reason: HOSPADM

## 2020-08-05 RX ORDER — PROPOFOL 10 MG/ML
INJECTION, EMULSION INTRAVENOUS PRN
Status: DISCONTINUED | OUTPATIENT
Start: 2020-08-05 | End: 2020-08-05 | Stop reason: SDUPTHER

## 2020-08-05 RX ORDER — HYDRALAZINE HYDROCHLORIDE 20 MG/ML
INJECTION INTRAMUSCULAR; INTRAVENOUS
Status: COMPLETED
Start: 2020-08-05 | End: 2020-08-05

## 2020-08-05 RX ORDER — BUPIVACAINE HYDROCHLORIDE 5 MG/ML
INJECTION, SOLUTION EPIDURAL; INTRACAUDAL PRN
Status: DISCONTINUED | OUTPATIENT
Start: 2020-08-05 | End: 2020-08-05 | Stop reason: ALTCHOICE

## 2020-08-05 RX ORDER — HYDROMORPHONE HCL 110MG/55ML
0.5 PATIENT CONTROLLED ANALGESIA SYRINGE INTRAVENOUS EVERY 5 MIN PRN
Status: DISCONTINUED | OUTPATIENT
Start: 2020-08-05 | End: 2020-08-05 | Stop reason: HOSPADM

## 2020-08-05 RX ORDER — CEFAZOLIN SODIUM 2 G/50ML
2 SOLUTION INTRAVENOUS ONCE
Status: DISCONTINUED | OUTPATIENT
Start: 2020-08-05 | End: 2020-08-05 | Stop reason: HOSPADM

## 2020-08-05 RX ADMIN — Medication 50 MCG: at 10:23

## 2020-08-05 RX ADMIN — HYDRALAZINE HYDROCHLORIDE 5 MG: 20 INJECTION INTRAMUSCULAR; INTRAVENOUS at 12:29

## 2020-08-05 RX ADMIN — Medication 25 MCG: at 10:55

## 2020-08-05 RX ADMIN — PROPOFOL 80 MG: 10 INJECTION, EMULSION INTRAVENOUS at 10:23

## 2020-08-05 RX ADMIN — SODIUM CHLORIDE, POTASSIUM CHLORIDE, SODIUM LACTATE AND CALCIUM CHLORIDE: 600; 310; 30; 20 INJECTION, SOLUTION INTRAVENOUS at 08:35

## 2020-08-05 RX ADMIN — Medication 25 MCG: at 10:39

## 2020-08-05 RX ADMIN — CEFAZOLIN 2000 MG: 1 INJECTION, POWDER, FOR SOLUTION INTRAMUSCULAR; INTRAVENOUS at 10:28

## 2020-08-05 RX ADMIN — HYDRALAZINE HYDROCHLORIDE 5 MG: 20 INJECTION INTRAMUSCULAR; INTRAVENOUS at 12:52

## 2020-08-05 RX ADMIN — PROPOFOL 75 MCG/KG/MIN: 10 INJECTION, EMULSION INTRAVENOUS at 10:23

## 2020-08-05 RX ADMIN — SODIUM CHLORIDE, POTASSIUM CHLORIDE, SODIUM LACTATE AND CALCIUM CHLORIDE: 600; 310; 30; 20 INJECTION, SOLUTION INTRAVENOUS at 10:19

## 2020-08-05 RX ADMIN — LIDOCAINE HYDROCHLORIDE 60 MG: 20 INJECTION, SOLUTION EPIDURAL; INFILTRATION; INTRACAUDAL; PERINEURAL at 10:23

## 2020-08-05 ASSESSMENT — PAIN SCALES - GENERAL
PAINLEVEL_OUTOF10: 0

## 2020-08-05 ASSESSMENT — PULMONARY FUNCTION TESTS
PIF_VALUE: 1
PIF_VALUE: 1
PIF_VALUE: 0
PIF_VALUE: 1
PIF_VALUE: 0
PIF_VALUE: 0
PIF_VALUE: 1
PIF_VALUE: 0
PIF_VALUE: 0
PIF_VALUE: 1
PIF_VALUE: 0
PIF_VALUE: 1
PIF_VALUE: 0
PIF_VALUE: 0
PIF_VALUE: 1
PIF_VALUE: 0
PIF_VALUE: 1
PIF_VALUE: 0
PIF_VALUE: 0
PIF_VALUE: 1

## 2020-08-05 NOTE — BRIEF OP NOTE
PODIATRY BRIEF OP NOTE    PATIENT NAME: Florinda Patel  YOB: 1945  -  69 y.o. female  MRN: [id number]  DATE: 8/5/2020  BILLING #: [id number]    Surgeon(s):  Rani Crane DPM     ASSISTANTS: Antonio Grace DPM PGY2, Reji Emery MS4    PRE-OP DIAGNOSIS:   1. Contracted 5th digit, left foot    POST-OP DIAGNOSIS: Same as above. PROCEDURE:   1. Hammertoe correction of 5th digit, left foot    ANESTHESIA: MAC with local (6 cc of 1:1 mixture 1% lidocaine plain and 0.5% marcaine plain)    HEMOSTASIS: Pneumatic ankle left  tourniquet @ 250 mmHg for 19  minutes. ESTIMATED BLOOD LOSS: Less than 5cc. MATERIALS: 4-0 nylon  * No implants in log *    INJECTABLES: none    SPECIMEN: none  * No specimens in log *    COMPLICATIONS: none    FINDINGS: digit in a more rectus position    The patient was counseled at length about the risks of cami Covid-19 during their perioperative period and any recovery window from their procedure. The patient was made aware that cami Covid-19  may worsen their prognosis for recovering from their procedure  and lend to a higher morbidity and/or mortality risk. All material risks, benefits, and reasonable alternatives including postponing the procedure were discussed. The patient does wish to proceed with the procedure at this time.     Antonio Grace DPM   Podiatric Medicine & Surgery   8/5/2020 at 11:08 AM

## 2020-08-05 NOTE — H&P
History and Physical Update    Pt Name: Debra Strong  MRN: [id number]  YOB: 1945  Date of evaluation: 8/5/2020      [x] I have reviewed the hardcopy Cardiac Clearance Progress Note by Robyn Cohen CNP dated 7/14/20 in short chart which meets the criteria for an Interval History and Physical note. [x] I have examined  Debra Strong  There are no changes to the patient who is scheduled for a elective derotational arthroplasty left 5th toe by Dr Demond Yen for left 5ht toe contusion. The patient denies health changes, fever, chills, wheezing, productive cough, open sores or wounds. Last Ibuprofen 800mg past week    Hx Paroxysmal SVT and follows with [facility]  Refer to hardcopy Cardiac Clearance Note by Robyn Cohen CNP in short chart   She denies increased SOB, dyspnea at rest, palpitations, dizziness, lightheadedness, chest pain or syncope. Vital signs: /80   Pulse 71   Temp 97.8 °F (36.6 °C) (Temporal)   Resp 16   Ht 5' 11.5\" (1.816 m)   Wt 211 lb 10.3 oz (96 kg)   SpO2 96%   BMI 29.11 kg/m²     Allergies:  Patient has no known allergies. Medications:    Prior to Admission medications    Medication Sig Start Date End Date Taking? Authorizing Provider   metoprolol succinate (TOPROL XL) 50 MG extended release tablet Take 50 mg by mouth daily 5/13/20  Yes Historical Provider, MD   ibuprofen (ADVIL;MOTRIN) 800 MG tablet Take 800 mg by mouth every 8 hours as needed for Pain   Yes Historical Provider, MD   anastrozole (ARIMIDEX) 1 MG tablet Take 1 mg by mouth daily   Yes Historical Provider, MD   acetaminophen (TYLENOL) 325 MG tablet Take 2 tablets by mouth every 6 hours as needed for Pain 2/14/19   Devon Ocasio PA-C         This is a 69 y.o. female who is pleasant, cooperative, alert and oriented x3, in no acute distress. Heart: Heart sounds are normal.  HR 71 regular rate and rhythm without murmur, gallop or rub.    Lungs: Normal respiratory effort with equal expansion, good air exchange, unlabored and clear to auscultation without wheezes or rales bilaterally   Abdomen: soft, nontender, nondistended with bowel sounds .        Labs:  Recent Labs     07/22/20  1603   HGB 11.8*   HCT 38.1   WBC 4.5   MCV 94.5         K 3.9      CO2 24   BUN 25*   CREATININE 1.35*   GLUCOSE 106*       Recent Labs     08/01/20  1400   COVID19 Not Detected       Pamella Sheldon ANP-BC  Electronically signed 8/5/2020 at 8:21 AM

## 2020-08-05 NOTE — PROGRESS NOTES
Dr Wilkes Gave notified of patient's blood pressure [id number] systolic with hr [id number]-[id number] sinus antonio. Order received to give hydralazine.
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
## 2020-08-05 NOTE — OP NOTE
PATIENT NAME: Ab Lopez  YOB: 1945  -  69 y.o. female  MRN: [id number]  DATE: 8/5/2020  BILLING #: [id number]     Surgeon(s):  Yousuf He DPM      ASSISTANTS: Waldo Myers DPM PGY2, Carmen Avitia MS4     PRE-OP DIAGNOSIS:   1. Contracted 5th digit, left foot     POST-OP DIAGNOSIS: Same as above.     PROCEDURE:   1. Hammertoe correction of 5th digit, left foot     ANESTHESIA: MAC with local (6 cc of 1:1 mixture 1% lidocaine plain and 0.5% marcaine plain)     HEMOSTASIS: Pneumatic ankle left  tourniquet @ 250 mmHg for 19  minutes.     ESTIMATED BLOOD LOSS: Less than 5cc.     MATERIALS: 4-0 nylon  * No implants in log *     INJECTABLES: none     SPECIMEN: none  * No specimens in log *     COMPLICATIONS: none     FINDINGS: digit in a more rectus position     The patient was counseled at length about the risks of cami Covid-19 during their perioperative period and any recovery window from their procedure.  The patient was made aware that cami Covid-19  may worsen their prognosis for recovering from their procedure  and lend to a higher morbidity and/or mortality risk.  All material risks, benefits, and reasonable alternatives including postponing the procedure were discussed. The patient does wish to proceed with the procedure at this time. INDICATIONS FOR PROCEDURE: Ab Lopez is a 69 y.o. female well known to Dr. Luis Manuel Collins with a CC of left fifth digit pain. Patient has failed conservative treatment and surgical treatment was recommended to which the patient is amenable. In preop all risks and benefits of the procedure were discussed at length prior to the patient signing the consent form. Consent is signed, witnessed, and placed in patient chart. The left foot was marked, antibiotics hung (Ancef), labs and images reviewed, n.p.o. status confirmed. No guarantees were given or implied.     PROCEDURE IN DETAIL: The patient was brought to the operating room and placed on the operating table in the supine position with a safety strap across the lap. A well-padded pneumatic ankle tourniquet was applied to the left ankle. After adequate sedation was given by the anesthesia team, a local block of  6 cc of 1:1 mixture of 1% lidocaine plain and 0.5% Marcaine plain was injected to the left foot preoperatively. The surgical site was then scrubbed, prepped, and draped in the usual aseptic manner. A timeout was performed confirming the patient's identity, correct site, correct procedure, allergies, and preoperative antibiotics. An Esmarch bandage was then used to exsanguinate left foot and the tourniquet was inflated to 250 mmHg. Attention was drawn to the dorsal lateral aspect of the fifth digit where a hyperkeratotic lesion was noted. Using a #15 blade, a dorsal eliptical incision was made from dorsal lateral proximal to dorsal distal medial over the proximal interphalangeal joint of the leftt foot. A flexor tenotomy was performed using a #15 blade at the plantar aspect of the PIPJ of the fifth digit. The deformity was noted to be flexible, thus no resection of bone was required. The surgical site was irrigated with copious amounts of normal sterile saline. The skin was reapproximated with 4-0 nylon. The digit was noted to be in a more rectus alignment. The incision was dressed with Betadine soaked Adaptic, 4 x 4's, Coban. The tourniquet was deflated and immediate hyperemia was noted to the digits. The patient tolerated the procedure and anesthesia well and was transferred to the PACU with all vital signs stable and vascular status intact to the left foot and ankle. Following a period of postoperative monitoring, the patient will be discharged to home and given instructions and prescriptions which were discussed prior to the surgery. Patient will be weightbearing as tolerated in a surgical shoe.  Instructed to keep dressing clean, dry, and intact until follow-up with  [facility] within 1 week.       Radhika Workman DPM   Podiatric Medicine & Surgery   8/5/2020 at 4:46 PM

## 2020-08-05 NOTE — ANESTHESIA POSTPROCEDURE EVALUATION
Department of Anesthesiology  Postprocedure Note    Patient: Osiris Perez  MRN: [id number]  YOB: 1945  Date of evaluation: 8/5/2020  Time:  1:04 PM     Procedure Summary     Date:  08/05/20 Room / Location:  [facility] 06 / [facility] - INPATIENT    Anesthesia Start:  2560 Anesthesia Stop:  1107    Procedure:  DEROTATIONAL  ARTHROPLASTY LEFT 5TH TOE (Left Foot) Diagnosis:  (DX CONTUSION LEFT 5TH TOE)    Surgeon:  Rossy Barron DPM Responsible Provider:  Brando Medina MD    Anesthesia Type:  MAC ASA Status:  3          Anesthesia Type: MAC    Seth Phase I: Seth Score: 9    Seth Phase II: Seth Score: 8    Last vitals: Reviewed and per EMR flowsheets.        Anesthesia Post Evaluation    Patient location during evaluation: PACU  Level of consciousness: awake and alert  Complications: no

## 2021-02-18 ENCOUNTER — OFFICE VISIT (OUTPATIENT)
Dept: PODIATRY | Age: 76
End: 2021-02-18
Payer: COMMERCIAL

## 2021-02-18 VITALS — RESPIRATION RATE: 18 BRPM | BODY MASS INDEX: 28.42 KG/M2 | TEMPERATURE: 97.7 F | HEIGHT: 71 IN | WEIGHT: 203 LBS

## 2021-02-18 DIAGNOSIS — M79.672 LEFT FOOT PAIN: ICD-10-CM

## 2021-02-18 DIAGNOSIS — R60.0 EDEMA OF LOWER EXTREMITY: ICD-10-CM

## 2021-02-18 DIAGNOSIS — M79.675 PAIN AND SWELLING OF TOE, LEFT: ICD-10-CM

## 2021-02-18 DIAGNOSIS — R26.2 TROUBLE WALKING: Primary | ICD-10-CM

## 2021-02-18 DIAGNOSIS — M79.89 PAIN AND SWELLING OF TOE, LEFT: ICD-10-CM

## 2021-02-18 PROCEDURE — 99203 OFFICE O/P NEW LOW 30 MIN: CPT | Performed by: PODIATRIST

## 2021-02-18 RX ORDER — METOPROLOL TARTRATE 50 MG/1
50 TABLET, FILM COATED ORAL DAILY
Status: ON HOLD | COMMUNITY
End: 2021-06-11 | Stop reason: HOSPADM

## 2021-02-18 RX ORDER — FUROSEMIDE 20 MG/1
20 TABLET ORAL DAILY
Qty: 14 TABLET | Refills: 0 | Status: ON HOLD
Start: 2021-02-18 | End: 2021-06-11 | Stop reason: HOSPADM
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
## 2021-03-11 ENCOUNTER — OFFICE VISIT (OUTPATIENT)
Dept: PODIATRY | Age: 76
End: 2021-03-11
Payer: COMMERCIAL

## 2021-03-11 VITALS — BODY MASS INDEX: 28.42 KG/M2 | WEIGHT: 203 LBS | HEIGHT: 71 IN | RESPIRATION RATE: 16 BRPM | TEMPERATURE: 97.2 F

## 2021-03-11 DIAGNOSIS — R26.2 TROUBLE WALKING: Primary | ICD-10-CM

## 2021-03-11 DIAGNOSIS — R60.0 EDEMA OF LOWER EXTREMITY: ICD-10-CM

## 2021-03-11 DIAGNOSIS — M79.672 LEFT FOOT PAIN: ICD-10-CM

## 2021-03-11 PROCEDURE — 99213 OFFICE O/P EST LOW 20 MIN: CPT | Performed by: PODIATRIST

## 2021-03-11 RX ORDER — AMLODIPINE BESYLATE 5 MG/1
TABLET ORAL
Status: ON HOLD | COMMUNITY
Start: 2021-02-25 | End: 2021-06-11 | Stop reason: SDUPTHER

## 2021-03-11 NOTE — PROGRESS NOTES
[facility]  [facility]  [address] [place] [address]  Dept: [phone]  Dept Fax: [phone]    RETURN PATIENT PROGRESS NOTE  Date of patient's visit: 3/11/2021  Patient's Name:  Ousmane Wilson YOB: 1945            Patient Care Team:  Ni Moncada MD as PCP - Terese Varner DPM as Physician (Podiatry)       Ousmane Wilson 77 y.o. female that presents for follow-up of   Chief Complaint   Patient presents with    Foot Swelling     left foot     Pt's primary care physician is Ni Moncada MD last seen 03/16/2021  Symptoms began 6 month(s) ago and are decreased . Patient relates pain is Present. Pain is rated 1 out of 10 and is described as intermittent, mild. Treatments prior to today's visit include: lasik. Currently denies F/C/N/V. Allergies   Allergen Reactions    No Known Allergies        Past Medical History:   Diagnosis Date    Cancer ([facility] [address]     left breast cancer    Hypertension     SVT (supraventricular tachycardia) ([facility] 2009       Prior to Admission medications    Medication Sig Start Date End Date Taking?  Authorizing Provider   amLODIPine (NORVASC) 5 MG tablet  2/25/21  Yes Historical Provider, MD   metoprolol tartrate (LOPRESSOR) 50 MG tablet Take 50 mg by mouth daily   Yes Historical Provider, MD   furosemide (LASIX) 20 MG tablet Take 1 tablet by mouth daily 2/18/21  Yes Shiraz Horan DPM   metoprolol succinate (TOPROL XL) 50 MG extended release tablet Take 50 mg by mouth daily 5/13/20  Yes Historical Provider, MD   ibuprofen (ADVIL;MOTRIN) 800 MG tablet Take 800 mg by mouth every 8 hours as needed for Pain   Yes Historical Provider, MD   anastrozole (ARIMIDEX) 1 MG tablet Take 1 mg by mouth daily   Yes Historical Provider, MD       Review of Systems    Review of Systems:  History obtained from chart review and the patient  General ROS: negative for - chills, fatigue, fever, night sweats or weight gain  Constitutional: options discussed in detail. Advised pt to her conditon. Pt to continue to elevate the 5th to at night. Wear compression stockings. Pt to take ibuprofen for pain and inflammation. Verbal and written instructions given to patient. Contact office with any questions/problems/concerns. No orders of the defined types were placed in this encounter. No orders of the defined types were placed in this encounter.        RTC in PRN.    3/11/2021      Electronically signed by Issac Stapleton DPM on 3/11/2021 at 3:18 PM  3/11/2021

## 2021-06-09 ENCOUNTER — HOSPITAL ENCOUNTER (INPATIENT)
Age: 76
LOS: 2 days | Discharge: HOME | DRG: 310 | End: 2021-06-11
Attending: EMERGENCY MEDICINE | Admitting: INTERNAL MEDICINE
Payer: MEDICARE

## 2021-06-09 ENCOUNTER — APPOINTMENT (OUTPATIENT)
Dept: GENERAL RADIOLOGY | Age: 76
DRG: 310 | End: 2021-06-09
Payer: MEDICARE

## 2021-06-09 DIAGNOSIS — I48.91 ATRIAL FIBRILLATION WITH TACHYCARDIC VENTRICULAR RATE (HCC): Primary | ICD-10-CM

## 2021-06-09 PROBLEM — Z85.3 H/O MALIGNANT NEOPLASM OF BREAST: Status: ACTIVE | Noted: 2021-06-09

## 2021-06-09 PROBLEM — Z86.79 H/O SUPRAVENTRICULAR TACHYCARDIA: Status: ACTIVE | Noted: 2021-06-09

## 2021-06-09 PROBLEM — N18.31 STAGE 3A CHRONIC KIDNEY DISEASE (HCC): Status: ACTIVE | Noted: 2021-06-09

## 2021-06-09 PROBLEM — I10 ESSENTIAL HYPERTENSION: Status: ACTIVE | Noted: 2021-06-09

## 2021-06-09 LAB
-: NORMAL
ABSOLUTE EOS #: 0.1 K/UL (ref 0–0.44)
ABSOLUTE IMMATURE GRANULOCYTE: <0.03 K/UL (ref 0–0.3)
ABSOLUTE LYMPH #: 1.67 K/UL (ref 1.1–3.7)
ABSOLUTE MONO #: 0.42 K/UL (ref 0.1–1.2)
ALBUMIN SERPL-MCNC: 3.5 G/DL (ref 3.5–5.2)
ALBUMIN/GLOBULIN RATIO: 1 (ref 1–2.5)
ALP BLD-CCNC: 85 U/L (ref 35–104)
ALT SERPL-CCNC: 10 U/L (ref 5–33)
ANION GAP SERPL CALCULATED.3IONS-SCNC: 8 MMOL/L (ref 9–17)
AST SERPL-CCNC: 25 U/L
BASOPHILS # BLD: 1 % (ref 0–2)
BASOPHILS ABSOLUTE: 0.05 K/UL (ref 0–0.2)
BILIRUB SERPL-MCNC: 0.3 MG/DL (ref 0.3–1.2)
BNP INTERPRETATION: NORMAL
BUN BLDV-MCNC: 22 MG/DL (ref 8–23)
BUN/CREAT BLD: ABNORMAL (ref 9–20)
CALCIUM SERPL-MCNC: 9.3 MG/DL (ref 8.6–10.4)
CHLORIDE BLD-SCNC: 108 MMOL/L (ref 98–107)
CO2: 25 MMOL/L (ref 20–31)
CREAT SERPL-MCNC: 1.37 MG/DL (ref 0.5–0.9)
D-DIMER QUANTITATIVE: 0.32 MG/L FEU
DIFFERENTIAL TYPE: NORMAL
EOSINOPHILS RELATIVE PERCENT: 2 % (ref 1–4)
GFR AFRICAN AMERICAN: 46 ML/MIN
GFR NON-AFRICAN AMERICAN: 38 ML/MIN
GFR SERPL CREATININE-BSD FRML MDRD: ABNORMAL ML/MIN/{1.73_M2}
GFR SERPL CREATININE-BSD FRML MDRD: ABNORMAL ML/MIN/{1.73_M2}
GLUCOSE BLD-MCNC: 118 MG/DL (ref 70–99)
HCT VFR BLD CALC: 37.7 % (ref 36.3–47.1)
HEMOGLOBIN: 11.9 G/DL (ref 11.9–15.1)
IMMATURE GRANULOCYTES: 0 %
LV EF: 35 %
LVEF MODALITY: NORMAL
LYMPHOCYTES # BLD: 31 % (ref 24–43)
MCH RBC QN AUTO: 28.7 PG (ref 25.2–33.5)
MCHC RBC AUTO-ENTMCNC: 31.6 G/DL (ref 28.4–34.8)
MCV RBC AUTO: 91.1 FL (ref 82.6–102.9)
MONOCYTES # BLD: 8 % (ref 3–12)
NRBC AUTOMATED: 0 PER 100 WBC
PARTIAL THROMBOPLASTIN TIME: 23.8 SEC (ref 20.5–30.5)
PARTIAL THROMBOPLASTIN TIME: 47.3 SEC (ref 20.5–30.5)
PDW BLD-RTO: 14.1 % (ref 11.8–14.4)
PLATELET # BLD: 260 K/UL (ref 138–453)
PLATELET ESTIMATE: NORMAL
PMV BLD AUTO: 11.6 FL (ref 8.1–13.5)
POTASSIUM SERPL-SCNC: 4.6 MMOL/L (ref 3.7–5.3)
PRO-BNP: 183 PG/ML
RBC # BLD: 4.14 M/UL (ref 3.95–5.11)
RBC # BLD: NORMAL 10*6/UL
REASON FOR REJECTION: NORMAL
SEG NEUTROPHILS: 58 % (ref 36–65)
SEGMENTED NEUTROPHILS ABSOLUTE COUNT: 3.06 K/UL (ref 1.5–8.1)
SODIUM BLD-SCNC: 141 MMOL/L (ref 135–144)
TOTAL PROTEIN: 6.9 G/DL (ref 6.4–8.3)
TROPONIN INTERP: ABNORMAL
TROPONIN T: ABNORMAL NG/ML
TROPONIN, HIGH SENSITIVITY: 18 NG/L (ref 0–14)
TSH SERPL DL<=0.05 MIU/L-ACNC: 1.29 MIU/L (ref 0.3–5)
WBC # BLD: 5.3 K/UL (ref 3.5–11.3)
WBC # BLD: NORMAL 10*3/UL
ZZ NTE CLEAN UP: ORDERED TEST: NORMAL
ZZ NTE WITH NAME CLEAN UP: SPECIMEN SOURCE: NORMAL

## 2021-06-09 PROCEDURE — 96375 TX/PRO/DX INJ NEW DRUG ADDON: CPT

## 2021-06-09 PROCEDURE — 93306 TTE W/DOPPLER COMPLETE: CPT

## 2021-06-09 PROCEDURE — 84484 ASSAY OF TROPONIN QUANT: CPT

## 2021-06-09 PROCEDURE — 6360000002 HC RX W HCPCS: Performed by: STUDENT IN AN ORGANIZED HEALTH CARE EDUCATION/TRAINING PROGRAM

## 2021-06-09 PROCEDURE — 99285 EMERGENCY DEPT VISIT HI MDM: CPT

## 2021-06-09 PROCEDURE — 80053 COMPREHEN METABOLIC PANEL: CPT

## 2021-06-09 PROCEDURE — 71045 X-RAY EXAM CHEST 1 VIEW: CPT

## 2021-06-09 PROCEDURE — 85730 THROMBOPLASTIN TIME PARTIAL: CPT

## 2021-06-09 PROCEDURE — 93005 ELECTROCARDIOGRAM TRACING: CPT | Performed by: EMERGENCY MEDICINE

## 2021-06-09 PROCEDURE — 83880 ASSAY OF NATRIURETIC PEPTIDE: CPT

## 2021-06-09 PROCEDURE — B246ZZZ ULTRASONOGRAPHY OF RIGHT AND LEFT HEART: ICD-10-PCS | Performed by: STUDENT IN AN ORGANIZED HEALTH CARE EDUCATION/TRAINING PROGRAM

## 2021-06-09 PROCEDURE — 85379 FIBRIN DEGRADATION QUANT: CPT

## 2021-06-09 PROCEDURE — 99223 1ST HOSP IP/OBS HIGH 75: CPT | Performed by: INTERNAL MEDICINE

## 2021-06-09 PROCEDURE — 6360000002 HC RX W HCPCS: Performed by: EMERGENCY MEDICINE

## 2021-06-09 PROCEDURE — 36415 COLL VENOUS BLD VENIPUNCTURE: CPT

## 2021-06-09 PROCEDURE — 2500000003 HC RX 250 WO HCPCS: Performed by: EMERGENCY MEDICINE

## 2021-06-09 PROCEDURE — 6370000000 HC RX 637 (ALT 250 FOR IP): Performed by: EMERGENCY MEDICINE

## 2021-06-09 PROCEDURE — 85025 COMPLETE CBC W/AUTO DIFF WBC: CPT

## 2021-06-09 PROCEDURE — 2580000003 HC RX 258: Performed by: EMERGENCY MEDICINE

## 2021-06-09 PROCEDURE — 96365 THER/PROPH/DIAG IV INF INIT: CPT

## 2021-06-09 PROCEDURE — 2060000000 HC ICU INTERMEDIATE R&B

## 2021-06-09 PROCEDURE — 84443 ASSAY THYROID STIM HORMONE: CPT

## 2021-06-09 RX ORDER — METOPROLOL TARTRATE 50 MG/1
50 TABLET, FILM COATED ORAL DAILY
Status: DISCONTINUED | OUTPATIENT
Start: 2021-06-09 | End: 2021-06-10

## 2021-06-09 RX ORDER — HEPARIN SODIUM 10000 [USP'U]/100ML
5-30 INJECTION, SOLUTION INTRAVENOUS CONTINUOUS
Status: DISCONTINUED | OUTPATIENT
Start: 2021-06-09 | End: 2021-06-11

## 2021-06-09 RX ORDER — ONDANSETRON 2 MG/ML
4 INJECTION INTRAMUSCULAR; INTRAVENOUS EVERY 6 HOURS PRN
Status: DISCONTINUED | OUTPATIENT
Start: 2021-06-09 | End: 2021-06-11 | Stop reason: HOSPADM

## 2021-06-09 RX ORDER — ACETAMINOPHEN 650 MG/1
650 SUPPOSITORY RECTAL EVERY 6 HOURS PRN
Status: DISCONTINUED | OUTPATIENT
Start: 2021-06-09 | End: 2021-06-11 | Stop reason: HOSPADM

## 2021-06-09 RX ORDER — HEPARIN SODIUM 1000 [USP'U]/ML
2000 INJECTION, SOLUTION INTRAVENOUS; SUBCUTANEOUS PRN
Status: DISCONTINUED | OUTPATIENT
Start: 2021-06-09 | End: 2021-06-11

## 2021-06-09 RX ORDER — SODIUM CHLORIDE 0.9 % (FLUSH) 0.9 %
5-40 SYRINGE (ML) INJECTION EVERY 12 HOURS SCHEDULED
Status: DISCONTINUED | OUTPATIENT
Start: 2021-06-09 | End: 2021-06-11 | Stop reason: HOSPADM

## 2021-06-09 RX ORDER — METOPROLOL TARTRATE 50 MG/1
50 TABLET, FILM COATED ORAL ONCE
Status: COMPLETED | OUTPATIENT
Start: 2021-06-09 | End: 2021-06-09

## 2021-06-09 RX ORDER — HEPARIN SODIUM 1000 [USP'U]/ML
4000 INJECTION, SOLUTION INTRAVENOUS; SUBCUTANEOUS ONCE
Status: COMPLETED | OUTPATIENT
Start: 2021-06-09 | End: 2021-06-09

## 2021-06-09 RX ORDER — POTASSIUM CHLORIDE 7.45 MG/ML
10 INJECTION INTRAVENOUS PRN
Status: DISCONTINUED | OUTPATIENT
Start: 2021-06-09 | End: 2021-06-11 | Stop reason: HOSPADM

## 2021-06-09 RX ORDER — ACETAMINOPHEN 325 MG/1
650 TABLET ORAL EVERY 6 HOURS PRN
Status: DISCONTINUED | OUTPATIENT
Start: 2021-06-09 | End: 2021-06-11 | Stop reason: HOSPADM

## 2021-06-09 RX ORDER — 0.9 % SODIUM CHLORIDE 0.9 %
1000 INTRAVENOUS SOLUTION INTRAVENOUS ONCE
Status: COMPLETED | OUTPATIENT
Start: 2021-06-09 | End: 2021-06-09

## 2021-06-09 RX ORDER — SODIUM CHLORIDE 0.9 % (FLUSH) 0.9 %
5-40 SYRINGE (ML) INJECTION PRN
Status: DISCONTINUED | OUTPATIENT
Start: 2021-06-09 | End: 2021-06-11 | Stop reason: HOSPADM

## 2021-06-09 RX ORDER — HEPARIN SODIUM 1000 [USP'U]/ML
4000 INJECTION, SOLUTION INTRAVENOUS; SUBCUTANEOUS PRN
Status: DISCONTINUED | OUTPATIENT
Start: 2021-06-09 | End: 2021-06-11

## 2021-06-09 RX ORDER — ONDANSETRON 4 MG/1
4 TABLET, ORALLY DISINTEGRATING ORAL EVERY 8 HOURS PRN
Status: DISCONTINUED | OUTPATIENT
Start: 2021-06-09 | End: 2021-06-11 | Stop reason: HOSPADM

## 2021-06-09 RX ORDER — SODIUM CHLORIDE 9 MG/ML
25 INJECTION, SOLUTION INTRAVENOUS PRN
Status: DISCONTINUED | OUTPATIENT
Start: 2021-06-09 | End: 2021-06-11 | Stop reason: HOSPADM

## 2021-06-09 RX ORDER — POLYETHYLENE GLYCOL 3350 17 G/17G
17 POWDER, FOR SOLUTION ORAL DAILY PRN
Status: DISCONTINUED | OUTPATIENT
Start: 2021-06-09 | End: 2021-06-11 | Stop reason: HOSPADM

## 2021-06-09 RX ORDER — METOPROLOL TARTRATE 5 MG/5ML
5 INJECTION INTRAVENOUS ONCE
Status: COMPLETED | OUTPATIENT
Start: 2021-06-09 | End: 2021-06-09

## 2021-06-09 RX ORDER — SODIUM CHLORIDE 9 MG/ML
INJECTION, SOLUTION INTRAVENOUS CONTINUOUS
Status: DISCONTINUED | OUTPATIENT
Start: 2021-06-09 | End: 2021-06-11

## 2021-06-09 RX ORDER — POTASSIUM CHLORIDE 20 MEQ/1
40 TABLET, EXTENDED RELEASE ORAL PRN
Status: DISCONTINUED | OUTPATIENT
Start: 2021-06-09 | End: 2021-06-11 | Stop reason: HOSPADM

## 2021-06-09 RX ORDER — MAGNESIUM SULFATE IN WATER 40 MG/ML
2000 INJECTION, SOLUTION INTRAVENOUS ONCE
Status: COMPLETED | OUTPATIENT
Start: 2021-06-09 | End: 2021-06-09

## 2021-06-09 RX ADMIN — MAGNESIUM SULFATE 2000 MG: 2 INJECTION INTRAVENOUS at 10:30

## 2021-06-09 RX ADMIN — METOPROLOL TARTRATE 50 MG: 50 TABLET, FILM COATED ORAL at 11:18

## 2021-06-09 RX ADMIN — SODIUM CHLORIDE 1000 ML: 9 INJECTION, SOLUTION INTRAVENOUS at 10:30

## 2021-06-09 RX ADMIN — HEPARIN SODIUM 4000 UNITS: 1000 INJECTION INTRAVENOUS; SUBCUTANEOUS at 13:13

## 2021-06-09 RX ADMIN — HEPARIN SODIUM AND DEXTROSE 11.6 UNITS/KG/HR: 10000; 5 INJECTION INTRAVENOUS at 13:14

## 2021-06-09 RX ADMIN — HEPARIN SODIUM 2000 UNITS: 1000 INJECTION INTRAVENOUS; SUBCUTANEOUS at 21:07

## 2021-06-09 RX ADMIN — METOPROLOL TARTRATE 5 MG: 1 INJECTION, SOLUTION INTRAVENOUS at 10:31

## 2021-06-09 ASSESSMENT — ENCOUNTER SYMPTOMS
WHEEZING: 0
COLOR CHANGE: 0
CONSTIPATION: 0
NAUSEA: 0
SORE THROAT: 0
VOMITING: 1
SINUS PAIN: 0
CHEST TIGHTNESS: 0
ABDOMINAL PAIN: 0
DIARRHEA: 0
SHORTNESS OF BREATH: 0
BACK PAIN: 0
COUGH: 0
NAUSEA: 1
FACIAL SWELLING: 0
VOICE CHANGE: 0

## 2021-06-09 NOTE — Clinical Note
Patient Class: Inpatient [101]   REQUIRED: Diagnosis: Atrial fibrillation ([facility] [address] [427.31. ICD-9-CM]   Estimated Length of Stay: Estimated stay of less than 2 midnights   Admitting Provider: Giuliana Ceballos [[id number]]   Telemetry/Cardiac Monitoring Required?: Yes
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
## 2021-06-09 NOTE — LETTER
[facility]  [address]  Phone: [phone]             June 11, 2021    Patient: Real Ocampo   YOB: 1945   Date of Visit: 6/9/2021       To Whom It May Concern:    Real Ocampo was seen and treated in our facility  beginning 6/9/2021 until 6/11/21 . She may return to work on 6/12/21.       Sincerely,       Yolie Sanchez MD         Signature:__________Shira Moura________________________
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
## 2021-06-09 NOTE — ED TRIAGE NOTES
Pt presents to ED by EMS with CO dizziness and emesis this morning 6/9/21    Pt speech clear and appropriate. Pt unsteady on feet. A&Ox4. Side rails up x 2 call light within reach.     Dr Patrick Posada at bedside to asses

## 2021-06-09 NOTE — ED NOTES
Bed: 27  Expected date:   Expected time:   Means of arrival:   Comments:  LS 1     Deepti Kang RN  06/09/21 5269

## 2021-06-09 NOTE — LETTER
French Mayer was seen and treated in our emergency department on 6/9/2021. She may return to work on 6/12/21. If you have any questions or concerns, please don't hesitate to call.       Electronically signed by Jonna Boykin MD on 6/11/2021 at 5:36 PM

## 2021-06-10 LAB
ABSOLUTE EOS #: 0.24 K/UL (ref 0–0.44)
ABSOLUTE IMMATURE GRANULOCYTE: <0.03 K/UL (ref 0–0.3)
ABSOLUTE LYMPH #: 2.11 K/UL (ref 1.1–3.7)
ABSOLUTE MONO #: 0.56 K/UL (ref 0.1–1.2)
ANION GAP SERPL CALCULATED.3IONS-SCNC: 10 MMOL/L (ref 9–17)
BASOPHILS # BLD: 1 % (ref 0–2)
BASOPHILS ABSOLUTE: 0.05 K/UL (ref 0–0.2)
BUN BLDV-MCNC: 18 MG/DL (ref 8–23)
BUN/CREAT BLD: ABNORMAL (ref 9–20)
CALCIUM SERPL-MCNC: 8.4 MG/DL (ref 8.6–10.4)
CHLORIDE BLD-SCNC: 109 MMOL/L (ref 98–107)
CO2: 21 MMOL/L (ref 20–31)
CREAT SERPL-MCNC: 1.09 MG/DL (ref 0.5–0.9)
DIFFERENTIAL TYPE: ABNORMAL
EKG ATRIAL RATE: 119 BPM
EKG ATRIAL RATE: 70 BPM
EKG P AXIS: 62 DEGREES
EKG P-R INTERVAL: 174 MS
EKG Q-T INTERVAL: 354 MS
EKG Q-T INTERVAL: 460 MS
EKG QRS DURATION: 92 MS
EKG QRS DURATION: 96 MS
EKG QTC CALCULATION (BAZETT): 496 MS
EKG QTC CALCULATION (BAZETT): 538 MS
EKG R AXIS: 11 DEGREES
EKG T AXIS: 11 DEGREES
EKG T AXIS: 58 DEGREES
EKG VENTRICULAR RATE: 139 BPM
EKG VENTRICULAR RATE: 70 BPM
EOSINOPHILS RELATIVE PERCENT: 4 % (ref 1–4)
GFR AFRICAN AMERICAN: 59 ML/MIN
GFR NON-AFRICAN AMERICAN: 49 ML/MIN
GFR SERPL CREATININE-BSD FRML MDRD: ABNORMAL ML/MIN/{1.73_M2}
GFR SERPL CREATININE-BSD FRML MDRD: ABNORMAL ML/MIN/{1.73_M2}
GLUCOSE BLD-MCNC: 104 MG/DL (ref 70–99)
HCT VFR BLD CALC: 34.6 % (ref 36.3–47.1)
HEMOGLOBIN: 10.7 G/DL (ref 11.9–15.1)
IMMATURE GRANULOCYTES: 0 %
LYMPHOCYTES # BLD: 38 % (ref 24–43)
MCH RBC QN AUTO: 28.8 PG (ref 25.2–33.5)
MCHC RBC AUTO-ENTMCNC: 30.9 G/DL (ref 28.4–34.8)
MCV RBC AUTO: 93.3 FL (ref 82.6–102.9)
MONOCYTES # BLD: 10 % (ref 3–12)
NRBC AUTOMATED: 0 PER 100 WBC
PARTIAL THROMBOPLASTIN TIME: 54.1 SEC (ref 20.5–30.5)
PARTIAL THROMBOPLASTIN TIME: 61 SEC (ref 20.5–30.5)
PARTIAL THROMBOPLASTIN TIME: 74.1 SEC (ref 20.5–30.5)
PDW BLD-RTO: 14.2 % (ref 11.8–14.4)
PLATELET # BLD: 197 K/UL (ref 138–453)
PLATELET ESTIMATE: ABNORMAL
PMV BLD AUTO: 11 FL (ref 8.1–13.5)
POTASSIUM SERPL-SCNC: 3.8 MMOL/L (ref 3.7–5.3)
RBC # BLD: 3.71 M/UL (ref 3.95–5.11)
RBC # BLD: ABNORMAL 10*6/UL
SEG NEUTROPHILS: 47 % (ref 36–65)
SEGMENTED NEUTROPHILS ABSOLUTE COUNT: 2.54 K/UL (ref 1.5–8.1)
SODIUM BLD-SCNC: 140 MMOL/L (ref 135–144)
WBC # BLD: 5.5 K/UL (ref 3.5–11.3)
WBC # BLD: ABNORMAL 10*3/UL

## 2021-06-10 PROCEDURE — 2060000000 HC ICU INTERMEDIATE R&B

## 2021-06-10 PROCEDURE — 6360000002 HC RX W HCPCS: Performed by: STUDENT IN AN ORGANIZED HEALTH CARE EDUCATION/TRAINING PROGRAM

## 2021-06-10 PROCEDURE — 97535 SELF CARE MNGMENT TRAINING: CPT

## 2021-06-10 PROCEDURE — 6370000000 HC RX 637 (ALT 250 FOR IP): Performed by: STUDENT IN AN ORGANIZED HEALTH CARE EDUCATION/TRAINING PROGRAM

## 2021-06-10 PROCEDURE — 85730 THROMBOPLASTIN TIME PARTIAL: CPT

## 2021-06-10 PROCEDURE — 85025 COMPLETE CBC W/AUTO DIFF WBC: CPT

## 2021-06-10 PROCEDURE — 99233 SBSQ HOSP IP/OBS HIGH 50: CPT | Performed by: INTERNAL MEDICINE

## 2021-06-10 PROCEDURE — 6370000000 HC RX 637 (ALT 250 FOR IP): Performed by: INTERNAL MEDICINE

## 2021-06-10 PROCEDURE — 97165 OT EVAL LOW COMPLEX 30 MIN: CPT

## 2021-06-10 PROCEDURE — 80048 BASIC METABOLIC PNL TOTAL CA: CPT

## 2021-06-10 PROCEDURE — 93010 ELECTROCARDIOGRAM REPORT: CPT | Performed by: INTERNAL MEDICINE

## 2021-06-10 PROCEDURE — 2580000003 HC RX 258: Performed by: STUDENT IN AN ORGANIZED HEALTH CARE EDUCATION/TRAINING PROGRAM

## 2021-06-10 PROCEDURE — 36415 COLL VENOUS BLD VENIPUNCTURE: CPT

## 2021-06-10 RX ORDER — METOPROLOL TARTRATE 50 MG/1
50 TABLET, FILM COATED ORAL 2 TIMES DAILY
Status: DISCONTINUED | OUTPATIENT
Start: 2021-06-10 | End: 2021-06-11

## 2021-06-10 RX ORDER — LOSARTAN POTASSIUM 50 MG/1
50 TABLET ORAL DAILY
Status: DISCONTINUED | OUTPATIENT
Start: 2021-06-10 | End: 2021-06-11

## 2021-06-10 RX ADMIN — SODIUM CHLORIDE, PRESERVATIVE FREE 10 ML: 5 INJECTION INTRAVENOUS at 20:39

## 2021-06-10 RX ADMIN — METOPROLOL TARTRATE 50 MG: 50 TABLET, FILM COATED ORAL at 08:17

## 2021-06-10 RX ADMIN — LOSARTAN POTASSIUM 50 MG: 50 TABLET, FILM COATED ORAL at 16:18

## 2021-06-10 RX ADMIN — POLYETHYLENE GLYCOL 3350 17 G: 17 POWDER, FOR SOLUTION ORAL at 08:26

## 2021-06-10 RX ADMIN — SODIUM CHLORIDE, PRESERVATIVE FREE 10 ML: 5 INJECTION INTRAVENOUS at 08:17

## 2021-06-10 RX ADMIN — METOPROLOL TARTRATE 50 MG: 50 TABLET, FILM COATED ORAL at 20:38

## 2021-06-10 RX ADMIN — HEPARIN SODIUM AND DEXTROSE 11.55 UNITS/KG/HR: 10000; 5 INJECTION INTRAVENOUS at 10:22

## 2021-06-10 NOTE — PROGRESS NOTES
Writer messaged cardiology to let them know patient is in NSR. Writer obtained a 12 lead EKG to confirm. Continuing the Heparin drip per cardiology.

## 2021-06-10 NOTE — CONSULTS
[facility]    Consult / H&P               Today's Date: 6/10/2021  Patient Name: Jessica Felipe  Date of admission: 6/9/2021  9:41 AM  Patient's age: 77 y.o., 1945  Admission Dx: Atrial fibrillation ([facility] [address] [I48.91]    Reason for Consult:  Cardiac evaluation    Requesting Physician: Shilpa Wagner MD    CHIEF COMPLAINT:  Dizziness and palpitation    History Obtained From:  patient, electronic medical record    HISTORY OF PRESENT ILLNESS:      The patient is a 77 y.o.  female who is admitted to the hospital for   Dizziness and palpitation. Pt has underlying history of SVT, breast cancer s/p anastrozole therapy, CKD stage 3 hypertension on amlodipine 5 mg, lopressor 50 mg at home admitted to hospital for concerned new onset atrial fibrillation. Pt indicates that she has these intermittent episodes of dizziness and palpitations 2 episodes/year since last 10 years. She recently had admission in [facility] on 09/2019 for SVT treated with adenosine and had a negative stress test.    In this admission she is found to be having atrial fibrillation on EKG and switched back to normal sinus. TSH of 1.29 and normal electrolytes    On evaluation pt is found to be hypertensive, converted back to sinus rhythm and being anticagualted with IV heparin and rate controlled. Cardiology consulted for management of atrial fibrillation. Past Medical History:   has a past medical history of Cancer ([facility] [address] Hypertension, and SVT (supraventricular tachycardia) ([facility] [address] Past Surgical History:   has a past surgical history that includes Breast surgery; Colonoscopy; Tibia fracture surgery (Right); eye surgery; and arthroplasty (Left, 8/5/2020). Home Medications:    Prior to Admission medications    Medication Sig Start Date End Date Taking?  Authorizing Provider   amLODIPine (NORVASC) 5 MG tablet  2/25/21   Historical Provider, MD   metoprolol tartrate (LOPRESSOR) 50 MG tablet Take 50 mg by mouth daily    Historical Provider, MD   furosemide (LASIX) 20 MG tablet Take 1 tablet by mouth daily 2/18/21   Lisandra Yadav, DPM   metoprolol succinate (TOPROL XL) 50 MG extended release tablet Take 50 mg by mouth daily 5/13/20   Historical Provider, MD   ibuprofen (ADVIL;MOTRIN) 800 MG tablet Take 800 mg by mouth every 8 hours as needed for Pain    Historical Provider, MD   anastrozole (ARIMIDEX) 1 MG tablet Take 1 mg by mouth daily    Historical Provider, MD      Current Facility-Administered Medications: metoprolol tartrate (LOPRESSOR) tablet 50 mg, 50 mg, Oral, BID  losartan (COZAAR) tablet 50 mg, 50 mg, Oral, Daily  heparin (porcine) injection 4,000 Units, 4,000 Units, Intravenous, PRN  heparin (porcine) injection 2,000 Units, 2,000 Units, Intravenous, PRN  heparin 25,000 units in dextrose 5% 250 mL (premix) infusion, 5-30 Units/kg/hr, Intravenous, Continuous  sodium chloride flush 0.9 % injection 5-40 mL, 5-40 mL, Intravenous, 2 times per day  sodium chloride flush 0.9 % injection 5-40 mL, 5-40 mL, Intravenous, PRN  0.9 % sodium chloride infusion, 25 mL, Intravenous, PRN  ondansetron (ZOFRAN-ODT) disintegrating tablet 4 mg, 4 mg, Oral, Q8H PRN **OR** ondansetron (ZOFRAN) injection 4 mg, 4 mg, Intravenous, Q6H PRN  polyethylene glycol (GLYCOLAX) packet 17 g, 17 g, Oral, Daily PRN  acetaminophen (TYLENOL) tablet 650 mg, 650 mg, Oral, Q6H PRN **OR** acetaminophen (TYLENOL) suppository 650 mg, 650 mg, Rectal, Q6H PRN  potassium chloride (KLOR-CON M) extended release tablet 40 mEq, 40 mEq, Oral, PRN **OR** potassium bicarb-citric acid (EFFER-K) effervescent tablet 40 mEq, 40 mEq, Oral, PRN **OR** potassium chloride 10 mEq/100 mL IVPB (Peripheral Line), 10 mEq, Intravenous, PRN  0.9 % sodium chloride infusion, , Intravenous, Continuous    Allergies:  No known allergies    Social History:   reports that she has never smoked. She has never used smokeless tobacco. She reports current alcohol use.  She reports that she does not use drugs. Family History: family history is not on file. No h/o sudden cardiac death. No for premature CAD    REVIEW OF SYSTEMS:    Constitutional: there has been no unanticipated weight loss. There's been No change in energy level, No change in activity level. Eyes: No visual changes or diplopia. No scleral icterus. ENT: No Headaches  Cardiovascular: Palpitations and dizziness  Respiratory: No previous pulmonary problems, No cough  Gastrointestinal: No abdominal pain. No change in bowel or bladder habits. Genitourinary: No dysuria, trouble voiding, or hematuria. Musculoskeletal:  No gait disturbance, No weakness or joint complaints. Integumentary: No rash or pruritis. Neurological: No headache, diplopia, change in muscle strength, numbness or tingling. No change in gait, balance, coordination, mood, affect, memory, mentation, behavior. Psychiatric: No anxiety, or depression. Endocrine: No temperature intolerance. No excessive thirst, fluid intake, or urination. No tremor. Hematologic/Lymphatic: No abnormal bruising or bleeding, blood clots or swollen lymph nodes. Allergic/Immunologic: No nasal congestion or hives. PHYSICAL EXAM:        BP (!) 143/85   Pulse 53   Temp 97.9 °F (36.6 °C) (Oral)   Resp 20   Ht 5' 11\" (1.803 m)   Wt 211 lb 9.6 oz (96 kg)   SpO2 98%   BMI 29.51 kg/m²    Constitutional and General Appearance: alert, cooperative, no distress and appears stated age  HEENT: PERRL, no cervical lymphadenopathy. No masses palpable. Normal oral mucosa  Respiratory:  Normal excursion and expansion without use of accessory muscles  Resp Auscultation: Good respiratory effort. No for increased work of breathing. On auscultation: clear to auscultation bilaterally  Cardiovascular:   The apical impulse is not displaced  Heart tones are crisp and normal. regular S1 and S2.  Jugular venous pulsation Normal  The carotid upstroke is normal in amplitude and contour without delay or bruit  Peripheral pulses are symmetrical and full   Abdomen:   No masses or tenderness  Bowel sounds present  Extremities:   No Cyanosis or Clubbing   Lower extremity edema: No   Skin: Warm and dry  Neurological:  Alert and oriented. Moves all extremities well  No abnormalities of mood, affect, memory, mentation, or behavior are noted    DATA:    Diagnostics:      EKG: atrial fibrillation converted back to  normal sinus rhythm    ECHO:   obtained and reviewed. Stress Test:   previously taken 09/2019 and show negative stress test.    Cardiac Angiography:   not obtained. Labs:     CBC:   Recent Labs     06/09/21  1023 06/10/21  0314   WBC 5.3 5.5   HGB 11.9 10.7*   HCT 37.7 34.6*    197     BMP:   Recent Labs     06/09/21  1056 06/10/21  0314    140   K 4.6 3.8   CO2 25 21   BUN 22 18   CREATININE 1.37* 1.09*   LABGLOM 38* 49*   GLUCOSE 118* 104*     BNP: No results for input(s): BNP in the last 72 hours. PT/INR: No results for input(s): PROTIME, INR in the last 72 hours. APTT:  Recent Labs     06/09/21  1945 06/10/21  0314   APTT 47.3* 74.1*     CARDIAC ENZYMES:No results for input(s): CKTOTAL, CKMB, CKMBINDEX, TROPONINI in the last 72 hours.   FASTING LIPID PANEL:No results found for: HDL, LDLDIRECT, LDLCALC, TRIG  LIVER PROFILE:  Recent Labs     06/09/21  1056   AST 25   ALT 10   LABALBU 3.5       IMPRESSION:      New onset vs Paroxysmal atrial fibrillation - unknown whether previous episodes of papitations were secondary to Afib vs SVT-  converted back to sinus  ([id number] score-  3)   Uncontrolled HTN    Patient Active Problem List   Diagnosis    SVT (supraventricular tachycardia) (HCC)    Atrial fibrillation with tachycardic ventricular rate (HCC)    H/O supraventricular tachycardia    H/O malignant neoplasm of breast    Essential hypertension    Stage 3a chronic kidney disease (HCC)       RECOMMENDATIONS:    Atrial Fibrillation converted back to sinus  Optimize BP added losartan 50 mg  Continue anticoagulation with IV heparin to reduce symptoms, prevent thromboembolism. Rate Control with (target HR of < 110 at rest) by Metoprolol 50 mg BID. Declined EF from >55 to 38% on TTE. Up on evaluation of TTE by Dr. Anatoly Neville will consider a limited echo tomorrow while in sinus rhythm to have a better assessment. Will monitor overnight for further episodes of atrial fibrillation and  consider Rhythm Control with flecainide provided no improvement of symptoms. No plans for cardioversion as pt converted back to sinus and considering this as paroxysmal > new onset atrial fibrillation. Will continue to monitor. Will discuss with rounding attending Dr. Anatoly Neville for final recommendations. Rosalva Christina MD  Internal Medicine Resident, [facility]; [place], [place]  6/10/2021, 9:26 AM      I reviewed the patient history personally, and examined by me during the visit. I have reviewed the H&P/Consult / Progress note as completed, and made appropriate changes to the patient exam and treatment plans.       I have reviewed the case with resident / fellow    Patient treatment plan was explained to patient, correction in notes was made as appropriate, and discussed final arrangement based on  my evaluation and exam.    Additional Recommendations:      Julieta Heayl MD  [facility]

## 2021-06-10 NOTE — PLAN OF CARE
Problem:  Activity:  Goal: Ability to tolerate increased activity will improve  Description: Ability to tolerate increased activity will improve  Outcome: Ongoing  Goal: Expression of feelings of increased energy will increase  Description: Expression of feelings of increased energy will increase  Outcome: Ongoing     Problem: Cardiac:  Goal: Ability to maintain an adequate cardiac output will improve  Description: Ability to maintain an adequate cardiac output will improve  Outcome: Ongoing  Goal: Complications related to the disease process, condition or treatment will be avoided or minimized  Description: Complications related to the disease process, condition or treatment will be avoided or minimized  Outcome: Ongoing  Goal: Ability to maintain vital signs within normal range will improve  Description: Ability to maintain vital signs within normal range will improve  Outcome: Ongoing  Goal: Cardiovascular alteration will improve  Description: Cardiovascular alteration will improve  Outcome: Ongoing     Problem: Coping:  Goal: Level of anxiety will decrease  Description: Level of anxiety will decrease  Outcome: Ongoing  Goal: General experience of comfort will improve  Description: General experience of comfort will improve  Outcome: Ongoing     Problem: Health Behavior:  Goal: Ability to manage health-related needs will improve  Description: Ability to manage health-related needs will improve  Outcome: Ongoing     Problem: Safety:  Goal: Ability to remain free from injury will improve  Description: Ability to remain free from injury will improve  Outcome: Ongoing  Goal: Will show no signs and symptoms of excessive bleeding  Description: Will show no signs and symptoms of excessive bleeding  Outcome: Ongoing

## 2021-06-10 NOTE — PROGRESS NOTES
Occupational Therapy   Occupational Therapy Initial Assessment  Date: 6/10/2021   Patient Name: Fahad Tyson  MRN: [id number]     : 1945    Date of Service: 6/10/2021  Obtained from Medical Chart:   71-year-old with history of SVT on metoprolol 50 mg states that she gets SVT 2 or 3 times a year however today felt this coming on, tried her Valsalva maneuvers and was able to go to work where she felt lightheaded, vomited, became sweaty, and was lightheaded.   Worse called 911, she initially attempted to refuse care however with a heart rate in the 150s and given her age I strongly recommended she be transported. On arrival patient had a heart rate of 140, she described lightheadedness as being mostly resolved however states that this is new for her. EKG from prehospital provider showed A. fib with RVR, she denies ever having A. fib. She does have a cardiologist but is unclear about the instructions and did not understand the diagnosis. Discharge Recommendations:  Defer OT at this time     Assessment   Assessment: Pt independent in functional mobility and ADLs w/ no acute needs at this time. Defer OT, OT POC discussed and agreed upon w/ pt. Prognosis: Good  Decision Making: Low Complexity  Patient Education: OT role, OT POC, purpose of eval - good return  REQUIRES OT FOLLOW UP: No  Activity Tolerance  Activity Tolerance: Patient Tolerated treatment well  Safety Devices  Safety Devices in place: Yes  Type of devices: Gait belt;Left in bed;Call light within reach  Restraints  Initially in place: No           Patient Diagnosis(es): The encounter diagnosis was Atrial fibrillation with tachycardic ventricular rate ([address] has a past medical history of Cancer ([address] Hypertension, and SVT (supraventricular tachycardia) ([address] has a past surgical history that includes Breast surgery; Colonoscopy; Tibia fracture surgery (Right); eye surgery; and arthroplasty (Left, 2020). Restrictions  Restrictions/Precautions  Restrictions/Precautions: Up as Tolerated  Required Braces or Orthoses?: No    Subjective   General  Patient assessed for rehabilitation services?: Yes  Family / Caregiver Present: No  Diagnosis: Atrial fibrillation with tachycardic ventricular rate ([address]  General Comment  Comments: RN ok'd pt for OT eval. Pt pleasant and cooperative throughout. Patient Currently in Pain: Denies    Social/Functional History  Social/Functional History  Lives With: Alone  Type of Home: Apartment  Home Layout: One level  Home Access: Stairs to enter with rails  Entrance Stairs - Number of Steps: Unsure  Entrance Stairs - Rails: Right  Bathroom Shower/Tub: Tub/Shower unit  Bathroom Toilet: Standard  ADL Assistance: Independent  Homemaking Assistance: Independent  Homemaking Responsibilities: Yes  Meal Prep Responsibility: Primary  Laundry Responsibility: Primary  Cleaning Responsibility: Primary  Shopping Responsibility: Primary  Ambulation Assistance: Independent  Transfer Assistance: Independent  Active : Yes  Mode of Transportation: Car  Occupation: Student  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  Leisure & Hobbies: walk, casino       Objective   Vision: Within Functional Limits  Hearing: Within functional limits          Balance  Sitting Balance: Independent (EOB ~12min)  Standing Balance: Independent  Standing Balance  Time: ~1-2min  Activity: Prior to functional mobility  Functional Mobility  Functional - Mobility Device: No device  Activity: To/from bathroom  Assist Level:  Independent  Toilet Transfers  Toilet - Technique: Ambulating  Equipment Used: Standard toilet  Toilet Transfer: Independent  ADL  Feeding: Independent  Grooming: Independent  UE Bathing: Independent  LE Bathing: Independent  UE Dressing: Independent  LE Dressing: Independent (Pt doffed/donned socks EOB)  Toileting: Independent (Pt engaged in toilet transfers only)  [facility] Tone: Normotonic  Tone LUE  LUE Tone: Normotonic  Coordination  Movements Are Fluid And Coordinated: Yes     Bed mobility  Supine to Sit: Independent  Sit to Supine: Independent  Transfers  Sit to stand: Independent  Stand to sit: Independent     Cognition  Overall Cognitive Status: WFL        Sensation  Overall Sensation Status: WFL        LUE AROM (degrees)  LUE AROM : WFL  Left Hand AROM (degrees)  Left Hand AROM: WFL  RUE AROM (degrees)  RUE AROM : WFL  Right Hand AROM (degrees)  Right Hand AROM: WFL  LUE Strength  Gross LUE Strength: WFL  LUE Strength Comment: grossly 4+/5  RUE Strength  Gross RUE Strength: WFL  RUE Strength Comment: grossly 4+/5       Plan   Plan  Plan Comment: Defer OT at this time d/t pt independent w/ no acute needs.  OT POC discussed and agreed upon w/ pt.    AM-PAC Score  AM-[facility] Inpatient Daily Activity Raw Score: 24 (06/10/21 1129)  AM-PAC Inpatient ADL T-Scale Score : 57.54 (06/10/21 1129)  ADL Inpatient CMS 0-100% Score: 0 (06/10/21 1129)  ADL Inpatient CMS G-Code Modifier : [address] (06/10/21 1129)    Goals          Therapy Time   Individual Concurrent Group Co-treatment   Time In 1004         Time Out 1019         Minutes 15         Timed Code Treatment Minutes: 12 Minutes       Anirudh Burt S/OT

## 2021-06-10 NOTE — PLAN OF CARE
Problem:  Activity:  Goal: Ability to tolerate increased activity will improve  Description: Ability to tolerate increased activity will improve  6/10/2021 0433 by Mac Oconnor RN  Outcome: Ongoing  6/9/2021 2330 by Mac Oconnor RN  Outcome: Ongoing  Goal: Expression of feelings of increased energy will increase  Description: Expression of feelings of increased energy will increase  6/10/2021 0433 by Mac Oconnor RN  Outcome: Ongoing  6/9/2021 2330 by Mac Oconnor RN  Outcome: Ongoing     Problem: Cardiac:  Goal: Ability to maintain an adequate cardiac output will improve  Description: Ability to maintain an adequate cardiac output will improve  6/10/2021 0433 by Mac Oconnor RN  Outcome: Ongoing  6/9/2021 2330 by Mac Oconnor RN  Outcome: Ongoing  Goal: Complications related to the disease process, condition or treatment will be avoided or minimized  Description: Complications related to the disease process, condition or treatment will be avoided or minimized  6/10/2021 0433 by Mac Oconnor RN  Outcome: Ongoing  6/9/2021 2330 by Mac Oconnor RN  Outcome: Ongoing  Goal: Ability to maintain vital signs within normal range will improve  Description: Ability to maintain vital signs within normal range will improve  6/10/2021 0433 by Mac Oconnor RN  Outcome: Ongoing  6/9/2021 2330 by Mac Oconnor RN  Outcome: Ongoing  Goal: Cardiovascular alteration will improve  Description: Cardiovascular alteration will improve  6/10/2021 0433 by Mac Oconnor RN  Outcome: Ongoing  6/9/2021 2330 by Mac Oconnor RN  Outcome: Ongoing     Problem: Coping:  Goal: Level of anxiety will decrease  Description: Level of anxiety will decrease  6/10/2021 0433 by Mac Oconnor RN  Outcome: Ongoing  6/9/2021 2330 by Mac Oconnor RN  Outcome: Ongoing  Goal: General experience of comfort will improve  Description: General experience of comfort will improve  6/10/2021 0433 by Mac Oconnor RN  Outcome:

## 2021-06-10 NOTE — PROGRESS NOTES
palpitations since this morning since waking up from the bed. Also associated with nausea, 2 episodes of vomiting. Patient was able to go to work. Had another episode of dizziness, presented to the ED. Denies chest pain, shortness of breath, fever, chills. Denies any stress, dehydration. Denies any dysuria, constipation, diarrhea. Denies any bilateral calf pain.     On presentation to the ED, patient is tachycardic heart rate 122, blood pressure 122/73, respiratory rate 22, afebrile. On examination, patient has irregular pulse, no murmurs, chest clear to auscultation bilaterally. Abdomen nontender. No tenderness in bilateral legs.     Pertinent labs include creatinine 1.37 (baseline around 1-1.3), troponin 18, CBC unremarkable, EKG with A. fib with RVR with heart rate in 140s. Chest x-ray unremarkable.     Previous cardiac testing includes stress test in 2019 which revealed no stress-induced ischemia. LVEF 47%. Intermediate risk. Echo in 2019 revealed EF greater than 55%. Mild MR, mild TR, trivial pulmonic insufficiency. OBJECTIVE     Vital Signs:  BP (!) 143/85   Pulse 53   Temp 97.9 °F (36.6 °C) (Oral)   Resp 20   Ht 5' 11\" (1.803 m)   Wt 211 lb 9.6 oz (96 kg)   SpO2 98%   BMI 29.51 kg/m²     Temp (24hrs), Av.2 °F (36.8 °C), Min:97.9 °F (36.6 °C), Max:98.6 °F (37 °C)    No intake/output data recorded. Physical Exam:  Constitutional: This is a well developed, well nourished, 25-29.9 - Overweight 77y.o. year old female who is alert, oriented, cooperative and in no apparent distress. Head:normocephalic and atraumatic. EENT:  PERRLA. No conjunctival injections. Septum was midline, mucosa was without erythema, exudates or cobblestoning. No thrush was noted. Neck: Supple without thyromegaly. No elevated JVP. Trachea was midline. Respiratory: Chest was symmetrical without dullness to percussion. Breath sounds bilaterally were clear to auscultation.  There were no wheezes, rhonchi or rales. There is no intercostal retraction or use of accessory muscles. No egophony noted. Cardiovascular: Irregular without murmur, clicks, gallops or rubs. Abdomen: Slightly rounded and soft without organomegaly. No rebound, rigidity or guarding was appreciated. Lymphatic: No lymphadenopathy. Musculoskeletal: Normal curvature of the spine. No gross muscle weakness. Extremities:  No lower extremity edema, ulcerations, tenderness, varicosities or erythema. Muscle size, tone and strength are normal.  No involuntary movements are noted. Skin:  Warm and dry. Good color, turgor and pigmentation. No lesions or scars. No cyanosis or clubbing  Neurological/Psychiatric: The patient's general behavior, level of consciousness, thought content and emotional status is normal.           Medications:  Scheduled Medications:    metoprolol tartrate  50 mg Oral BID    sodium chloride flush  5-40 mL Intravenous 2 times per day     Continuous Infusions:    heparin (PORCINE) Infusion 11.552 Units/kg/hr (06/10/21 0425)    sodium chloride      sodium chloride       PRN Medicationsheparin (porcine), 4,000 Units, PRN  heparin (porcine), 2,000 Units, PRN  sodium chloride flush, 5-40 mL, PRN  sodium chloride, 25 mL, PRN  ondansetron, 4 mg, Q8H PRN   Or  ondansetron, 4 mg, Q6H PRN  polyethylene glycol, 17 g, Daily PRN  acetaminophen, 650 mg, Q6H PRN   Or  acetaminophen, 650 mg, Q6H PRN  potassium chloride, 40 mEq, PRN   Or  potassium alternative oral replacement, 40 mEq, PRN   Or  potassium chloride, 10 mEq, PRN        Diagnostic Labs:  CBC:   Recent Labs     06/09/21  1023 06/10/21  0314   WBC 5.3 5.5   RBC 4.14 3.71*   HGB 11.9 10.7*   HCT 37.7 34.6*   MCV 91.1 93.3   RDW 14.1 14.2    197     BMP:   Recent Labs     06/09/21  1056 06/10/21  0314    140   K 4.6 3.8   * 109*   CO2 25 21   BUN 22 18   CREATININE 1.37* 1.09*     BNP: No results for input(s): BNP in the last 72 hours.   PT/INR:

## 2021-06-10 NOTE — PLAN OF CARE
Problem:  Activity:  Goal: Ability to tolerate increased activity will improve  Description: Ability to tolerate increased activity will improve  6/10/2021 1408 by Mignon Wong RN  Outcome: Ongoing  6/10/2021 0433 by Chacho Garcia RN  Outcome: Ongoing  Goal: Expression of feelings of increased energy will increase  Description: Expression of feelings of increased energy will increase  6/10/2021 1408 by Mignon Wong RN  Outcome: Ongoing  6/10/2021 0433 by Chacho Garcia RN  Outcome: Ongoing     Problem: Cardiac:  Goal: Ability to maintain an adequate cardiac output will improve  Description: Ability to maintain an adequate cardiac output will improve  6/10/2021 1408 by Mignon Wong RN  Outcome: Ongoing  6/10/2021 0433 by Chacho Garcia RN  Outcome: Ongoing  Goal: Complications related to the disease process, condition or treatment will be avoided or minimized  Description: Complications related to the disease process, condition or treatment will be avoided or minimized  6/10/2021 1408 by Mignon Wong RN  Outcome: Ongoing  6/10/2021 0433 by Chacho Garcia RN  Outcome: Ongoing  Goal: Ability to maintain vital signs within normal range will improve  Description: Ability to maintain vital signs within normal range will improve  6/10/2021 1408 by Mignon Wong RN  Outcome: Ongoing  6/10/2021 0433 by Chacho Garcia RN  Outcome: Ongoing  Goal: Cardiovascular alteration will improve  Description: Cardiovascular alteration will improve  6/10/2021 1408 by Mignon Wong RN  Outcome: Ongoing  6/10/2021 0433 by Chacho Garcia RN  Outcome: Ongoing     Problem: Coping:  Goal: Level of anxiety will decrease  Description: Level of anxiety will decrease  6/10/2021 0433 by Chacho Garcia RN  Outcome: Ongoing  Goal: General experience of comfort will improve  Description: General experience of comfort will improve  6/10/2021 0433 by Chacho Garcia RN  Outcome: Ongoing     Problem: Health Behavior:  Goal: Ability to manage health-related needs will improve  Description: Ability to manage health-related needs will improve  6/10/2021 0433 by Lito Meléndez RN  Outcome: Ongoing     Problem: Safety:  Goal: Ability to remain free from injury will improve  Description: Ability to remain free from injury will improve  6/10/2021 1408 by Rodolfo Polanco RN  Outcome: Ongoing  6/10/2021 0433 by Lito Meléndez RN  Outcome: Ongoing  Goal: Will show no signs and symptoms of excessive bleeding  Description: Will show no signs and symptoms of excessive bleeding  6/10/2021 1408 by Rodolfo Polanco RN  Outcome: Ongoing  6/10/2021 0433 by Lito Meléndez RN  Outcome: Ongoing     Problem: Falls - Risk of:  Goal: Will remain free from falls  Description: Will remain free from falls  Outcome: Ongoing  Goal: Absence of physical injury  Description: Absence of physical injury  Outcome: Ongoing

## 2021-06-10 NOTE — PROGRESS NOTES
Physical Therapy        Physical Therapy Cancel Note      DATE: 6/10/2021    NAME: Kuashik Barrett  MRN: [id number]   : 1945      Patient not seen this date for Physical Therapy due to:    Patient independent with functional mobility. Will defer PT evaluation at this time. Please reorder PT if future needs arise. Electronically signed by DONNA Jarrett on 6/10/2021 at 11:13 AM   This treatment/evaluation completed by signing SPT. Signing PT agrees with treatment and documentation.

## 2021-06-10 NOTE — CARE COORDINATION
Case Management Initial Discharge Plan  Fahad Tyson,             Met with:patient to discuss discharge plans. Information verified: address, contacts, phone number, , insurance Yes  Insurance Provider: [facility] Medicare    Emergency Contact/Next of Kin name & number: Rhoderick Mohs, [phone]  Who are involved in patient's support system? family    PCP: Kevin Strong MD  Date of last visit: a few months ago      Discharge Planning    Living Arrangements:  [address] has 2 stories  2 stairs to climb to get into front door, 1 flight of stairs to climb to reach second floor  Location of bedroom/bathroom in home main    Patient able to perform ADL's:Independent    Current Services (outpatient & in home) none  DME equipment: none  DME provider:     Is patient receiving oral anticoagulation therapy? No    If indicated:   Physician managing anticoagulation treatment:   Where does patient obtain lab work for ATC treatment? Potential Assistance Needed:  N/A    Patient agreeable to home care: No  [place] of choice provided:  n/a    Prior SNF/Rehab Placement and Facility: none  Agreeable to SNF/Rehab: No  [place] of choice provided: n/a     Evaluation: n/a    Expected Discharge date:       Patient expects to be discharged to:  home independently    If home: is the family and/or caregiver wiling & able to provide support at home? yes  Who will be providing this support? family    Follow Up Appointment: Best Day/ Time:      Transportation provider: family  Transportation arrangements needed for discharge: No    Readmission Risk              Risk of Unplanned Readmission:  9             Does patient have a readmission risk score greater than 14?: No  If yes, follow-up appointment must be made within 7 days of discharge.      Goals of Care:       Educated patient on transitional options, provided freedom of choice and are agreeable with plan      Discharge Plan: home independently, no needs, has alie          Electronically signed by Mack Lozano RN on 6/10/21 at 4:46 PM EDT

## 2021-06-11 VITALS
DIASTOLIC BLOOD PRESSURE: 84 MMHG | RESPIRATION RATE: 18 BRPM | WEIGHT: 211.6 LBS | BODY MASS INDEX: 29.62 KG/M2 | SYSTOLIC BLOOD PRESSURE: 150 MMHG | HEART RATE: 80 BPM | HEIGHT: 71 IN | OXYGEN SATURATION: 96 % | TEMPERATURE: 98.2 F

## 2021-06-11 LAB
ABSOLUTE EOS #: 0.26 K/UL (ref 0–0.44)
ABSOLUTE IMMATURE GRANULOCYTE: <0.03 K/UL (ref 0–0.3)
ABSOLUTE LYMPH #: 1.57 K/UL (ref 1.1–3.7)
ABSOLUTE MONO #: 0.46 K/UL (ref 0.1–1.2)
ANION GAP SERPL CALCULATED.3IONS-SCNC: 13 MMOL/L (ref 9–17)
BASOPHILS # BLD: 1 % (ref 0–2)
BASOPHILS ABSOLUTE: 0.05 K/UL (ref 0–0.2)
BUN BLDV-MCNC: 18 MG/DL (ref 8–23)
BUN/CREAT BLD: ABNORMAL (ref 9–20)
CALCIUM SERPL-MCNC: 8.6 MG/DL (ref 8.6–10.4)
CHLORIDE BLD-SCNC: 107 MMOL/L (ref 98–107)
CO2: 21 MMOL/L (ref 20–31)
CREAT SERPL-MCNC: 0.88 MG/DL (ref 0.5–0.9)
DIFFERENTIAL TYPE: ABNORMAL
EOSINOPHILS RELATIVE PERCENT: 6 % (ref 1–4)
GFR AFRICAN AMERICAN: >60 ML/MIN
GFR NON-AFRICAN AMERICAN: >60 ML/MIN
GFR SERPL CREATININE-BSD FRML MDRD: ABNORMAL ML/MIN/{1.73_M2}
GFR SERPL CREATININE-BSD FRML MDRD: ABNORMAL ML/MIN/{1.73_M2}
GLUCOSE BLD-MCNC: 109 MG/DL (ref 70–99)
HCT VFR BLD CALC: 35.7 % (ref 36.3–47.1)
HEMOGLOBIN: 11.4 G/DL (ref 11.9–15.1)
IMMATURE GRANULOCYTES: 0 %
LYMPHOCYTES # BLD: 35 % (ref 24–43)
MCH RBC QN AUTO: 28.9 PG (ref 25.2–33.5)
MCHC RBC AUTO-ENTMCNC: 31.9 G/DL (ref 28.4–34.8)
MCV RBC AUTO: 90.4 FL (ref 82.6–102.9)
MONOCYTES # BLD: 10 % (ref 3–12)
NRBC AUTOMATED: 0 PER 100 WBC
PARTIAL THROMBOPLASTIN TIME: 49.3 SEC (ref 20.5–30.5)
PARTIAL THROMBOPLASTIN TIME: 67.8 SEC (ref 20.5–30.5)
PDW BLD-RTO: 13.8 % (ref 11.8–14.4)
PLATELET # BLD: 202 K/UL (ref 138–453)
PLATELET ESTIMATE: ABNORMAL
PMV BLD AUTO: 11.3 FL (ref 8.1–13.5)
POTASSIUM SERPL-SCNC: 3.9 MMOL/L (ref 3.7–5.3)
RBC # BLD: 3.95 M/UL (ref 3.95–5.11)
RBC # BLD: ABNORMAL 10*6/UL
SEG NEUTROPHILS: 48 % (ref 36–65)
SEGMENTED NEUTROPHILS ABSOLUTE COUNT: 2.12 K/UL (ref 1.5–8.1)
SODIUM BLD-SCNC: 141 MMOL/L (ref 135–144)
WBC # BLD: 4.5 K/UL (ref 3.5–11.3)
WBC # BLD: ABNORMAL 10*3/UL

## 2021-06-11 PROCEDURE — 6370000000 HC RX 637 (ALT 250 FOR IP): Performed by: STUDENT IN AN ORGANIZED HEALTH CARE EDUCATION/TRAINING PROGRAM

## 2021-06-11 PROCEDURE — 85025 COMPLETE CBC W/AUTO DIFF WBC: CPT

## 2021-06-11 PROCEDURE — 80048 BASIC METABOLIC PNL TOTAL CA: CPT

## 2021-06-11 PROCEDURE — 36415 COLL VENOUS BLD VENIPUNCTURE: CPT

## 2021-06-11 PROCEDURE — 6370000000 HC RX 637 (ALT 250 FOR IP): Performed by: INTERNAL MEDICINE

## 2021-06-11 PROCEDURE — 99233 SBSQ HOSP IP/OBS HIGH 50: CPT | Performed by: INTERNAL MEDICINE

## 2021-06-11 PROCEDURE — 93308 TTE F-UP OR LMTD: CPT

## 2021-06-11 PROCEDURE — 6360000002 HC RX W HCPCS: Performed by: STUDENT IN AN ORGANIZED HEALTH CARE EDUCATION/TRAINING PROGRAM

## 2021-06-11 PROCEDURE — B245ZZZ ULTRASONOGRAPHY OF LEFT HEART: ICD-10-PCS | Performed by: INTERNAL MEDICINE

## 2021-06-11 PROCEDURE — 85730 THROMBOPLASTIN TIME PARTIAL: CPT

## 2021-06-11 RX ORDER — AMLODIPINE BESYLATE 10 MG/1
10 TABLET ORAL DAILY
Status: DISCONTINUED | OUTPATIENT
Start: 2021-06-11 | End: 2021-06-11

## 2021-06-11 RX ORDER — LOSARTAN POTASSIUM 50 MG/1
50 TABLET ORAL DAILY
Status: DISCONTINUED | OUTPATIENT
Start: 2021-06-11 | End: 2021-06-11

## 2021-06-11 RX ORDER — LOSARTAN POTASSIUM 50 MG/1
50 TABLET ORAL DAILY
Status: DISCONTINUED | OUTPATIENT
Start: 2021-06-12 | End: 2021-06-11

## 2021-06-11 RX ORDER — AMLODIPINE BESYLATE 5 MG/1
10 TABLET ORAL DAILY
Qty: 30 TABLET | Refills: 3 | Status: SHIPPED | OUTPATIENT
Start: 2021-06-11

## 2021-06-11 RX ADMIN — HEPARIN SODIUM AND DEXTROSE 13.55 UNITS/KG/HR: 10000; 5 INJECTION INTRAVENOUS at 11:49

## 2021-06-11 RX ADMIN — METOPROLOL TARTRATE 25 MG: 25 TABLET ORAL at 09:48

## 2021-06-11 RX ADMIN — HEPARIN SODIUM 2000 UNITS: 1000 INJECTION INTRAVENOUS; SUBCUTANEOUS at 07:33

## 2021-06-11 RX ADMIN — LOSARTAN POTASSIUM 50 MG: 50 TABLET, FILM COATED ORAL at 09:49

## 2021-06-11 NOTE — CARE COORDINATION
Transitional planning-talked with patient. Plan is to go home alone. Not wanting any home care at this time. Drove self.

## 2021-06-11 NOTE — PROGRESS NOTES
[facility]   Progress Note                   Date:   6/11/2021  Patient name: Fady Molina  Date of admission:  6/9/2021  9:41 AM  MRN:   [id number]  YOB: 1945  PCP: Nicho Galeano MD    Reason for Admission: Atrial fibrillation ([address] [I48.91]    Subjective:       Clinical Changes / Abnormalities: Pt seen and examined in the room. Pt denies any CP or SOB. Pt remains NSR       Medications:   Scheduled Meds:   metoprolol tartrate  25 mg Oral BID    [START ON 6/12/2021] losartan  50 mg Oral Daily    sodium chloride flush  5-40 mL Intravenous 2 times per day     Continuous Infusions:   heparin (PORCINE) Infusion 13.536 Units/kg/hr (06/11/21 0731)    sodium chloride       CBC:   Recent Labs     06/09/21  1023 06/10/21  0314 06/11/21  0545   WBC 5.3 5.5 4.5   HGB 11.9 10.7* 11.4*    197 202     BMP:    Recent Labs     06/09/21  1056 06/10/21  0314 06/11/21  0545    140 141   K 4.6 3.8 3.9   * 109* 107   CO2 25 21 21   BUN 22 18 18   CREATININE 1.37* 1.09* 0.88   GLUCOSE 118* 104* 109*     Hepatic:   Recent Labs     06/09/21  1056   AST 25   ALT 10   BILITOT 0.30   ALKPHOS 85     Troponin:   Recent Labs     06/09/21  1056   TROPHS 18*     BNP: No results for input(s): BNP in the last 72 hours. Lipids: No results for input(s): CHOL, HDL in the last 72 hours. Invalid input(s): LDLCALCU  INR: No results for input(s): INR in the last 72 hours. Objective:   Vitals: BP (!) 150/84   Pulse 80   Temp 98.2 °F (36.8 °C) (Oral)   Resp 18   Ht 5' 11\" (1.803 m)   Wt 211 lb 9.6 oz (96 kg)   SpO2 94%   BMI 29.51 kg/m²   General appearance: alert and cooperative with exam  HEENT: Head: Normocephalic, no lesions, without obvious abnormality.   Neck: no JVD, trachea midline, no adenopathy  Lungs: Clear to auscultation  Heart: Regular rate and rhythm, s1/s2 auscultated, no murmurs  Abdomen: soft, non-tender, bowel sounds active  Extremities: no edema  Neurologic: not embolization    Disclaimer: Risk Score calculation is dependent on accuracy of patient problem list and past encounter diagnosis. Plan of Treatment:   1. Afib with RVR. Remains NSR. Continue BB. On heparin gtt. Will start on Eliquis 5mg BID prior to discharge. 2. Reduced EF. Likely secondary to AFib with RVR. Await limited ECHO today to re-evaluate EF. Negative stress in 2019 with preserved ef on ECHO.     3. If EF wnl, ok to d/c home and will follow up as outpt in 2 weeks     Electronically signed by JEY Platt CNP on 6/11/2021 at 11:49 AM  [address]  [phone]

## 2021-06-11 NOTE — FLOWSHEET NOTE
[address] [facility] [address]  PROGRESS NOTE    Room # 4977/7399-14   Name: Be Childs            Age: 77 y.o. Gender: female            Admit Date & Time: 6/9/2021  9:41 AM    PATIENT/EVENT DESCRIPTION:  Be Childs is a 77 y.o. female        SPIRITUAL ASSESSMENT/INTERVENTION:  The patient was calm and approachable. The  was able to activity listen and explore their thoughts and feelings. They engaged in the conversation and appeared to be coping. The  offered spiritual support. The patient was comforted by prayer. SPIRITUAL CARE FOLLOW-UP PLAN:  [facility] will remain available to offer spiritual and emotional support as needed. [facility]plains can be contacted 24/7 by [facility]    Electronically signed by Sheri Cruz, on 6/11/2021 at 4:47 PM.  [facility]  [phone]             06/11/21 1426   Encounter Summary   Services provided to: Patient   Referral/Consult From: Rounding   Continue Visiting   (06/11/21)   Complexity of Encounter Moderate   Length of Encounter 15 minutes   Spiritual Assessment Completed Yes   Routine   Type Initial   Assessment Calm; Approachable   Intervention Active listening;Prayer   Outcome Engaged in conversation

## 2021-06-11 NOTE — PROGRESS NOTES
Printed out education from [employer] on [address] [place] [address] and A-fib for pt, reviewed information and answered questions pt had. Will leave information for pt to review and check back to see if has additional questions.

## 2021-06-11 NOTE — PROGRESS NOTES
ECHO reviewed with Dr. Meera Jewell in the cath lab.     EF wnl 55-60%    No further workup needed at this time   Ok to d/c home from cards standpoint

## 2021-06-11 NOTE — PROGRESS NOTES
[facility]  Internal Medicine Teaching Residency Program  Inpatient Daily Progress Note  ______________________________________________________________________________    Patient: Kt Lawrence  YOB: 1945   YJC:[id number]    Acct: [de-identified]     Room: [facility]  Admit date: 6/9/2021  Today's date: 06/11/21  Number of days in the hospital: 2    SUBJECTIVE   Admitting Diagnosis: Atrial fibrillation with tachycardic ventricular rate ([address]  CC: Dizziness  Pt examined at bedside. Chart & results reviewed. No acute events overnight. Continues to be in sinus Rhythm  Rate in the 50s and late 45s. Decrease lopressor to 25mg oral bid. Blood pressure 161/82 > 135/76. Started on losartan 50mg oral daily. Echo revealed reduced EF from 55 to 35%. Plan for limited echo by cardiology. Denies any further episodes of palpitations and  Dizziness. ROS:  Constitutional: Positive for activity change. Negative for appetite change, chills, diaphoresis, fatigue and fever. HENT: Negative for hearing loss, sinus pain, sore throat and tinnitus. Eyes: Negative for visual disturbance. Respiratory: Negative for cough, chest tightness, shortness of breath and wheezing. Cardiovascular: Improved palpitations. Negative for chest pain and leg swelling. Gastrointestinal: Improved  nausea and vomiting. Negative for abdominal pain, constipation and diarrhea. Endocrine: Negative for polyuria. Genitourinary: Negative for dysuria, frequency and hematuria. Musculoskeletal: Negative for arthralgias, back pain and joint swelling. Skin: Negative for color change. Neurological: Improved dizziness. Negative for syncope, weakness and headaches.    Psychiatric/Behavioral: The patient is not nervous/anxious  BRIEF HISTORY     The patient is a pleasant 77 y.o. female with a PMH of SVT treated with adenosine, history of breast cancer s/p anastrozole therapy, CKD stage 3a , essential hypertension presents with a chief complaint of dizziness, palpitations since this morning since waking up from the bed. Also associated with nausea, 2 episodes of vomiting. Patient was able to go to work. Had another episode of dizziness, presented to the ED. Denies chest pain, shortness of breath, fever, chills. Denies any stress, dehydration. Denies any dysuria, constipation, diarrhea. Denies any bilateral calf pain.     On presentation to the ED, patient is tachycardic heart rate 122, blood pressure 122/73, respiratory rate 22, afebrile. On examination, patient has irregular pulse, no murmurs, chest clear to auscultation bilaterally. Abdomen nontender. No tenderness in bilateral legs.     Pertinent labs include creatinine 1.37 (baseline around 1-1.3), troponin 18, CBC unremarkable, EKG with A. fib with RVR with heart rate in 140s. Chest x-ray unremarkable.     Previous cardiac testing includes stress test in 2019 which revealed no stress-induced ischemia. LVEF 47%. Intermediate risk. Echo in 2019 revealed EF greater than 55%. Mild MR, mild TR, trivial pulmonic insufficiency. OBJECTIVE     Vital Signs:  BP (!) 161/82   Pulse 52   Temp 97.9 °F (36.6 °C) (Oral)   Resp 18   Ht 5' 11\" (1.803 m)   Wt 211 lb 9.6 oz (96 kg)   SpO2 96%   BMI 29.51 kg/m²     Temp (24hrs), Av.1 °F (36.7 °C), Min:97.9 °F (36.6 °C), Max:98.6 °F (37 °C)    No intake/output data recorded. Physical Exam:  Constitutional: This is a well developed, well nourished, 25-29.9 - Overweight 77y.o. year old female who is alert, oriented, cooperative and in no apparent distress. Head:normocephalic and atraumatic. EENT:  PERRLA. No conjunctival injections. Septum was midline, mucosa was without erythema, exudates or cobblestoning. No thrush was noted. Neck: Supple without thyromegaly. No elevated JVP. Trachea was midline. Respiratory: Chest was symmetrical without dullness to percussion.   Breath sounds bilaterally were clear to auscultation. There were no wheezes, rhonchi or rales. There is no intercostal retraction or use of accessory muscles. No egophony noted. Cardiovascular: Irregular without murmur, clicks, gallops or rubs. Abdomen: Slightly rounded and soft without organomegaly. No rebound, rigidity or guarding was appreciated. Lymphatic: No lymphadenopathy. Musculoskeletal: Normal curvature of the spine. No gross muscle weakness. Extremities:  No lower extremity edema, ulcerations, tenderness, varicosities or erythema. Muscle size, tone and strength are normal.  No involuntary movements are noted. Skin:  Warm and dry. Good color, turgor and pigmentation. No lesions or scars.   No cyanosis or clubbing  Neurological/Psychiatric: The patient's general behavior, level of consciousness, thought content and emotional status is normal.           Medications:  Scheduled Medications:    metoprolol tartrate  25 mg Oral BID    losartan  50 mg Oral Daily    sodium chloride flush  5-40 mL Intravenous 2 times per day     Continuous Infusions:    heparin (PORCINE) Infusion 13.536 Units/kg/hr (06/11/21 0731)    sodium chloride      sodium chloride       PRN Medicationsheparin (porcine), 4,000 Units, PRN  heparin (porcine), 2,000 Units, PRN  sodium chloride flush, 5-40 mL, PRN  sodium chloride, 25 mL, PRN  ondansetron, 4 mg, Q8H PRN   Or  ondansetron, 4 mg, Q6H PRN  polyethylene glycol, 17 g, Daily PRN  acetaminophen, 650 mg, Q6H PRN   Or  acetaminophen, 650 mg, Q6H PRN  potassium chloride, 40 mEq, PRN   Or  potassium alternative oral replacement, 40 mEq, PRN   Or  potassium chloride, 10 mEq, PRN        Diagnostic Labs:  CBC:   Recent Labs     06/09/21  1023 06/10/21  0314 06/11/21  0545   WBC 5.3 5.5 4.5   RBC 4.14 3.71* 3.95   HGB 11.9 10.7* 11.4*   HCT 37.7 34.6* 35.7*   MCV 91.1 93.3 90.4   RDW 14.1 14.2 13.8    197 202     BMP:   Recent Labs     06/09/21  1056 06/10/21  0314 06/11/21  0545    140 141   K 4.6 3.8 3.9   * 109* 107   CO2 25 21 21   BUN 22 18 18   CREATININE 1.37* 1.09* 0.88     BNP: No results for input(s): BNP in the last 72 hours. PT/INR: No results for input(s): PROTIME, INR in the last 72 hours. APTT:   Recent Labs     06/10/21  1005 06/10/21  1542 06/11/21  0545   APTT 61.0* 54.1* 49.3*     CARDIAC ENZYMES: No results for input(s): CKMB, CKMBINDEX, TROPONINI in the last 72 hours. Invalid input(s): CKTOTAL;3  FASTING LIPID PANEL:No results found for: CHOL, HDL, TRIG  LIVER PROFILE:   Recent Labs     06/09/21  1056   AST 25   ALT 10   BILITOT 0.30   ALKPHOS 85      MICROBIOLOGY:   Lab Results   Component Value Date/Time    CULTURE ORAL AGUSTIN PRESENT 01/25/2012 04:32 AM    CULTURE NEGATIVE FOR GROUP A STREPTOCOCCI 01/25/2012 04:32 AM    CULTURE  01/25/2012 04:32 AM     Performed at [address] [place] [address] 3 [phone]       Imaging:    XR CHEST PORTABLE    Result Date: 6/9/2021  No acute process. ASSESSMENT & PLAN     ASSESSMENT / PLAN:     1. New onset A. fib with RVR. Currently in sinus antonio. Last known sinus rhythm unknown. [id number] score 3. Rate control with Lopressor 25 mg oral twice daily. Continue heparin drip. Discontinue fluids. Cardiology consulted. No plan for chemical/electrical cardioversion. Echo with reduced EF 35% . Plan for limited echo today. 2. Essential hypertension. Start losartan 50mg po oral daily  3. CKD stage III a. Creatinine baseline around 1-1.3.  4. History of SVT. Previous stress test with intermediate risk. Echo with EF greater than 55%. Treated with adenosine. 5. History of breast cancer. S/p anastrozole therapy. Currently not taking for the past few months    DVT ppx: On heparin drip  GI ppx: Not indicated     PT/OT/SW: Consulted  Discharge Planning: Ongoing    Zane Pace MD  Internal Medicine Resident, PGY-1  [facility]  6/11/2021, 8:12 AM

## 2021-06-15 NOTE — DISCHARGE SUMMARY
[address]     Department of Internal Medicine - Staff Internal Medicine Teaching Service    INPATIENT DISCHARGE SUMMARY      Patient Identification:  Corrine Brito is a 77 y.o. female. :  1945  MRN: [id number]     Acct: [de-identified]   PCP: Corina Riley MD  Admit Date:  2021  Discharge date and time: 2021  6:34 PM   Attending Provider: Dr. Kellee Yuen Problem Lists:  Principal Problem:    Atrial fibrillation with tachycardic ventricular rate (HCC)  Active Problems:    H/O supraventricular tachycardia    H/O malignant neoplasm of breast    Essential hypertension    Stage 3a chronic kidney disease ([facility] [address]  Resolved Problems:    * No resolved hospital problems. *      HOSPITAL STAY     Brief Inpatient course:   Corrine Brito is a 77 y.o. female with a PMH of SVT treated with adenosine, history of breast cancer s/p anastrozole therapy, CKD stage 3a , essential hypertension who was admitted for the management of Atrial fibrillation with tachycardic ventricular rate ([facility] [address] presented to the emergency department with a chief complaint of dizziness, palpitations. Pertinent labs include creatinine 1.37 (baseline around 1-1.3), troponin 18, CBC unremarkable, EKG with A. fib with RVR with heart rate in 140s.  Chest x-ray unremarkable. Previous cardiac testing includes stress test in 2019 which revealed no stress-induced ischemia.  LVEF 47%.  Intermediate risk.  Echo in 2019 revealed EF greater than 55%.  Mild MR, mild TR, trivial pulmonic insufficiency    significant therapeutic interventions done at the hospital:   1. New onset A. fib with RVR.  Currently in sinus antonio.  Last known sinus rhythm unknown.  [id number] score 3.  Rate control with Lopressor 25 mg oral twice daily.  Discontinue heparin drip.  Discontinue fluids.  Cardiology consulted.  No plan for chemical/electrical cardioversion.  Echo with reduced EF 35% . Plan for limited echo today. EF wnl 55-60%. Start Eliquis 5mg oral twice a day. 2. Essential hypertension. Start losartan 50mg po oral daily  3. CKD stage III a.  Creatinine baseline around 1-1.3.  4. History of SVT.  Previous stress test with intermediate risk.  Echo with EF greater than 55%.  Treated with adenosine.   5. History of breast cancer.  S/p anastrozole therapy.  Currently not taking for the past few months      Procedures/ Significant Interventions:    None    Consults:     Consults:     Final Specialist Recommendations/Findings:   IP CONSULT TO INTERNAL MEDICINE  IP CONSULT TO CARDIOLOGY  IP CONSULT TO CASE MANAGEMENT      Any Hospital Acquired Infections: none    Discharge Functional Status:  stable    DISCHARGE PLAN     Disposition: home    Patient Instructions:   Discharge Medication List as of 6/11/2021  5:39 PM      START taking these medications    Details   apixaban (ELIQUIS) 5 MG TABS tablet Take 1 tablet by mouth 2 times daily, Disp-60 tablet, R-5Normal         CONTINUE these medications which have CHANGED    Details   amLODIPine (NORVASC) 5 MG tablet Take 2 tablets by mouth daily, Disp-30 tablet, R-3Normal         CONTINUE these medications which have NOT CHANGED    Details   metoprolol succinate (TOPROL XL) 50 MG extended release tablet Take 50 mg by mouth dailyHistorical Med      anastrozole (ARIMIDEX) 1 MG tablet Take 1 mg by mouth dailyHistorical Med         STOP taking these medications       metoprolol tartrate (LOPRESSOR) 50 MG tablet Comments:   Reason for Stopping:         furosemide (LASIX) 20 MG tablet Comments:   Reason for Stopping:         ibuprofen (ADVIL;MOTRIN) 800 MG tablet Comments:   Reason for Stopping:               Activity: activity as tolerated    Diet: renal diet    Follow-up:    [facility]  [address] [place] [id number]  [phone]  On 7/2/2021  at 1:15 pm with ZAYRA Yu MD  [address] [address]  [phone]    Schedule an appointment as soon as possible for a visit in 2 weeks  post hospital follow up. eliquis added to her medication list.       Patient Instructions: Please continue to take amlodipine 10 mg daily  Please continue to take Eliquis 5 mg twice daily as per cardiology recommendation. Please continue to take Toprol XL 50 mg daily. Please follow-up with your primary care physician within 2 weeks of discharge. If your Eliquis is not covered by your insurance then your PCP will change it to some other blood thinner. Please follow-up with your cardiologist as advised. Elliott Ford MD  Internal Medicine Resident, PGY-1  [facility];  [place], [place]  6/15/2021, 2:40 PM

## 2021-08-27 ENCOUNTER — APPOINTMENT (OUTPATIENT)
Dept: CT IMAGING | Age: 76
End: 2021-08-27
Payer: MEDICARE

## 2021-08-27 ENCOUNTER — HOSPITAL ENCOUNTER (EMERGENCY)
Age: 76
Discharge: HOME | End: 2021-08-27
Attending: STUDENT IN AN ORGANIZED HEALTH CARE EDUCATION/TRAINING PROGRAM
Payer: MEDICARE

## 2021-08-27 ENCOUNTER — APPOINTMENT (OUTPATIENT)
Dept: GENERAL RADIOLOGY | Age: 76
End: 2021-08-27
Payer: MEDICARE

## 2021-08-27 VITALS
WEIGHT: 198 LBS | SYSTOLIC BLOOD PRESSURE: 106 MMHG | BODY MASS INDEX: 27.72 KG/M2 | OXYGEN SATURATION: 95 % | TEMPERATURE: 98.2 F | RESPIRATION RATE: 18 BRPM | HEART RATE: 89 BPM | HEIGHT: 71 IN | DIASTOLIC BLOOD PRESSURE: 71 MMHG

## 2021-08-27 DIAGNOSIS — R06.02 SHORTNESS OF BREATH: ICD-10-CM

## 2021-08-27 DIAGNOSIS — U07.1 COVID-19: Primary | ICD-10-CM

## 2021-08-27 DIAGNOSIS — E86.0 DEHYDRATION: ICD-10-CM

## 2021-08-27 LAB
ABSOLUTE EOS #: 0.08 K/UL (ref 0–0.44)
ABSOLUTE IMMATURE GRANULOCYTE: 0.03 K/UL (ref 0–0.3)
ABSOLUTE LYMPH #: 0.84 K/UL (ref 1.1–3.7)
ABSOLUTE MONO #: 0.67 K/UL (ref 0.1–1.2)
ANION GAP SERPL CALCULATED.3IONS-SCNC: 11 MMOL/L (ref 9–17)
BASOPHILS # BLD: 0 % (ref 0–2)
BASOPHILS ABSOLUTE: <0.03 K/UL (ref 0–0.2)
BNP INTERPRETATION: ABNORMAL
BUN BLDV-MCNC: 33 MG/DL (ref 8–23)
BUN/CREAT BLD: 23 (ref 9–20)
CALCIUM SERPL-MCNC: 9.1 MG/DL (ref 8.6–10.4)
CHLORIDE BLD-SCNC: 103 MMOL/L (ref 98–107)
CO2: 24 MMOL/L (ref 20–31)
CREAT SERPL-MCNC: 1.42 MG/DL (ref 0.5–0.9)
D-DIMER QUANTITATIVE: 0.54 MG/L FEU (ref 0–0.59)
DIFFERENTIAL TYPE: ABNORMAL
EOSINOPHILS RELATIVE PERCENT: 1 % (ref 1–4)
GFR AFRICAN AMERICAN: 44 ML/MIN
GFR NON-AFRICAN AMERICAN: 36 ML/MIN
GFR SERPL CREATININE-BSD FRML MDRD: ABNORMAL ML/MIN/{1.73_M2}
GFR SERPL CREATININE-BSD FRML MDRD: ABNORMAL ML/MIN/{1.73_M2}
GLUCOSE BLD-MCNC: 106 MG/DL (ref 70–99)
HCT VFR BLD CALC: 33.9 % (ref 36.3–47.1)
HEMOGLOBIN: 10.7 G/DL (ref 11.9–15.1)
IMMATURE GRANULOCYTES: 1 %
LYMPHOCYTES # BLD: 14 % (ref 24–43)
MCH RBC QN AUTO: 28.7 PG (ref 25.2–33.5)
MCHC RBC AUTO-ENTMCNC: 31.6 G/DL (ref 28.4–34.8)
MCV RBC AUTO: 90.9 FL (ref 82.6–102.9)
MONOCYTES # BLD: 11 % (ref 3–12)
NRBC AUTOMATED: 0 PER 100 WBC
PDW BLD-RTO: 13.3 % (ref 11.8–14.4)
PLATELET # BLD: 218 K/UL (ref 138–453)
PLATELET ESTIMATE: ABNORMAL
PMV BLD AUTO: 10.4 FL (ref 8.1–13.5)
POTASSIUM SERPL-SCNC: 3.3 MMOL/L (ref 3.7–5.3)
PRO-BNP: 357 PG/ML
RBC # BLD: 3.73 M/UL (ref 3.95–5.11)
RBC # BLD: ABNORMAL 10*6/UL
SEG NEUTROPHILS: 73 % (ref 36–65)
SEGMENTED NEUTROPHILS ABSOLUTE COUNT: 4.59 K/UL (ref 1.5–8.1)
SODIUM BLD-SCNC: 138 MMOL/L (ref 135–144)
TROPONIN INTERP: ABNORMAL
TROPONIN INTERP: ABNORMAL
TROPONIN T: ABNORMAL NG/ML
TROPONIN T: ABNORMAL NG/ML
TROPONIN, HIGH SENSITIVITY: 18 NG/L (ref 0–14)
TROPONIN, HIGH SENSITIVITY: 20 NG/L (ref 0–14)
WBC # BLD: 6.2 K/UL (ref 3.5–11.3)
WBC # BLD: ABNORMAL 10*3/UL

## 2021-08-27 PROCEDURE — 84484 ASSAY OF TROPONIN QUANT: CPT

## 2021-08-27 PROCEDURE — 96365 THER/PROPH/DIAG IV INF INIT: CPT

## 2021-08-27 PROCEDURE — 96366 THER/PROPH/DIAG IV INF ADDON: CPT

## 2021-08-27 PROCEDURE — 85025 COMPLETE CBC W/AUTO DIFF WBC: CPT

## 2021-08-27 PROCEDURE — 99284 EMERGENCY DEPT VISIT MOD MDM: CPT

## 2021-08-27 PROCEDURE — 93005 ELECTROCARDIOGRAM TRACING: CPT | Performed by: STUDENT IN AN ORGANIZED HEALTH CARE EDUCATION/TRAINING PROGRAM

## 2021-08-27 PROCEDURE — 71260 CT THORAX DX C+: CPT

## 2021-08-27 PROCEDURE — 80048 BASIC METABOLIC PNL TOTAL CA: CPT

## 2021-08-27 PROCEDURE — 83880 ASSAY OF NATRIURETIC PEPTIDE: CPT

## 2021-08-27 PROCEDURE — 6360000002 HC RX W HCPCS: Performed by: STUDENT IN AN ORGANIZED HEALTH CARE EDUCATION/TRAINING PROGRAM

## 2021-08-27 PROCEDURE — 6360000004 HC RX CONTRAST MEDICATION: Performed by: STUDENT IN AN ORGANIZED HEALTH CARE EDUCATION/TRAINING PROGRAM

## 2021-08-27 PROCEDURE — 71045 X-RAY EXAM CHEST 1 VIEW: CPT

## 2021-08-27 PROCEDURE — 2580000003 HC RX 258: Performed by: STUDENT IN AN ORGANIZED HEALTH CARE EDUCATION/TRAINING PROGRAM

## 2021-08-27 PROCEDURE — 6370000000 HC RX 637 (ALT 250 FOR IP): Performed by: STUDENT IN AN ORGANIZED HEALTH CARE EDUCATION/TRAINING PROGRAM

## 2021-08-27 PROCEDURE — 85379 FIBRIN DEGRADATION QUANT: CPT

## 2021-08-27 RX ORDER — ACETAMINOPHEN 500 MG
1000 TABLET ORAL EVERY 6 HOURS PRN
Qty: 60 TABLET | Refills: 0 | Status: SHIPPED | OUTPATIENT
Start: 2021-08-27

## 2021-08-27 RX ORDER — ONDANSETRON 4 MG/1
4 TABLET, ORALLY DISINTEGRATING ORAL EVERY 8 HOURS PRN
Qty: 12 TABLET | Refills: 0 | Status: SHIPPED | OUTPATIENT
Start: 2021-08-27 | End: 2022-06-07

## 2021-08-27 RX ORDER — MAGNESIUM SULFATE 1 G/100ML
1000 INJECTION INTRAVENOUS ONCE
Status: COMPLETED | OUTPATIENT
Start: 2021-08-27 | End: 2021-08-27

## 2021-08-27 RX ORDER — BENZONATATE 100 MG/1
100 CAPSULE ORAL ONCE
Status: COMPLETED | OUTPATIENT
Start: 2021-08-27 | End: 2021-08-27

## 2021-08-27 RX ORDER — BENZONATATE 100 MG/1
100 CAPSULE ORAL 3 TIMES DAILY PRN
Qty: 21 CAPSULE | Refills: 0 | Status: SHIPPED | OUTPATIENT
Start: 2021-08-27 | End: 2021-09-03

## 2021-08-27 RX ORDER — ACETAMINOPHEN 500 MG
1000 TABLET ORAL ONCE
Status: COMPLETED | OUTPATIENT
Start: 2021-08-27 | End: 2021-08-27

## 2021-08-27 RX ORDER — 0.9 % SODIUM CHLORIDE 0.9 %
80 INTRAVENOUS SOLUTION INTRAVENOUS ONCE
Status: COMPLETED | OUTPATIENT
Start: 2021-08-27 | End: 2021-08-27

## 2021-08-27 RX ORDER — POTASSIUM CHLORIDE 20 MEQ/1
40 TABLET, EXTENDED RELEASE ORAL ONCE
Status: COMPLETED | OUTPATIENT
Start: 2021-08-27 | End: 2021-08-27

## 2021-08-27 RX ORDER — GUAIFENESIN/DEXTROMETHORPHAN 100-10MG/5
5 SYRUP ORAL 3 TIMES DAILY PRN
Qty: 120 ML | Refills: 0 | Status: SHIPPED | OUTPATIENT
Start: 2021-08-27 | End: 2021-09-06

## 2021-08-27 RX ORDER — SODIUM CHLORIDE 0.9 % (FLUSH) 0.9 %
10 SYRINGE (ML) INJECTION PRN
Status: DISCONTINUED | OUTPATIENT
Start: 2021-08-27 | End: 2021-08-27 | Stop reason: HOSPADM

## 2021-08-27 RX ORDER — SODIUM CHLORIDE, SODIUM LACTATE, POTASSIUM CHLORIDE, AND CALCIUM CHLORIDE .6; .31; .03; .02 G/100ML; G/100ML; G/100ML; G/100ML
1000 INJECTION, SOLUTION INTRAVENOUS ONCE
Status: COMPLETED | OUTPATIENT
Start: 2021-08-27 | End: 2021-08-27

## 2021-08-27 RX ADMIN — BENZONATATE 100 MG: 100 CAPSULE ORAL at 16:30

## 2021-08-27 RX ADMIN — IOPAMIDOL 75 ML: 755 INJECTION, SOLUTION INTRAVENOUS at 17:02

## 2021-08-27 RX ADMIN — MAGNESIUM SULFATE HEPTAHYDRATE 1000 MG: 1 INJECTION, SOLUTION INTRAVENOUS at 17:25

## 2021-08-27 RX ADMIN — SODIUM CHLORIDE, PRESERVATIVE FREE 10 ML: 5 INJECTION INTRAVENOUS at 17:03

## 2021-08-27 RX ADMIN — ACETAMINOPHEN 1000 MG: 500 TABLET ORAL at 16:30

## 2021-08-27 RX ADMIN — SODIUM CHLORIDE, POTASSIUM CHLORIDE, SODIUM LACTATE AND CALCIUM CHLORIDE 1000 ML: 600; 310; 30; 20 INJECTION, SOLUTION INTRAVENOUS at 17:25

## 2021-08-27 RX ADMIN — POTASSIUM CHLORIDE 40 MEQ: 20 TABLET, EXTENDED RELEASE ORAL at 19:15

## 2021-08-27 RX ADMIN — SODIUM CHLORIDE 80 ML: 9 INJECTION, SOLUTION INTRAVENOUS at 17:02

## 2021-08-27 ASSESSMENT — PAIN SCALES - GENERAL
PAINLEVEL_OUTOF10: 0
PAINLEVEL_OUTOF10: 0

## 2021-08-28 LAB
EKG ATRIAL RATE: 77 BPM
EKG P AXIS: 60 DEGREES
EKG P-R INTERVAL: 132 MS
EKG Q-T INTERVAL: 446 MS
EKG QRS DURATION: 86 MS
EKG QTC CALCULATION (BAZETT): 504 MS
EKG R AXIS: 1 DEGREES
EKG T AXIS: -9 DEGREES
EKG VENTRICULAR RATE: 77 BPM

## 2021-08-28 PROCEDURE — 93010 ELECTROCARDIOGRAM REPORT: CPT | Performed by: INTERNAL MEDICINE

## 2021-08-29 ASSESSMENT — ENCOUNTER SYMPTOMS
ABDOMINAL PAIN: 0
EYE REDNESS: 0
SHORTNESS OF BREATH: 1
EYE DISCHARGE: 0
COLOR CHANGE: 0

## 2021-08-30 ENCOUNTER — CARE COORDINATION (OUTPATIENT)
Dept: CARE COORDINATION | Age: 76
End: 2021-08-30

## 2021-08-30 NOTE — ED PROVIDER NOTES
[facility] 119 ED  Emergency Department Encounter     Pt Name: Silvio Menard  MRN: [id number]  [place] 1945  Date of evaluation: 8/29/21  PCP:  Gay Green MD    [address] [place] [address]       Chief Complaint   Patient presents with    Positive For Covid-19     tested 1 week ago, vaccinated 3/31/21    Shortness of Breath    Dizziness    Headache       HISTORY OFPRESENT ILLNESS  (Location/Symptom, Timing/Onset, Context/Setting, Quality, Duration, Modifying [place] [employer].)      Silvio Menard is a 77 y.o. female who presents with shortness of breath, lightheadedness when standing, generalized headache, malaise, fatigue. Reportedly Covid +1-week prior. Was vaccinated in the past in March. States symptoms have been worsening since she tested positive approximately a week ago. Does have significant history of atrial fibrillation as well as cancer history. Is anticoagulated on Eliquis however stopped taking this when she tested positive concerned about a possible interaction with Covid. Increasing dyspnea upon ambulation. Mild headache. Throbbing. Nonradiating. Denies any falls or striking of the head or focal weakness. Denies any history for DVT, PE, lower extremity swelling or edema, recent long travel, or exogenous hormone use. PAST MEDICAL / SURGICAL / SOCIAL / FAMILY HISTORY      has a past medical history of Atrial fibrillation ([address] Cancer ([address] Hypertension, and SVT (supraventricular tachycardia) ([address] has a past surgical history that includes Breast surgery; Colonoscopy; Tibia fracture surgery (Right); eye surgery; and arthroplasty (Left, 8/5/2020).     Social History     Socioeconomic History    Marital status: Single     Spouse name: Not on file    Number of children: Not on file    Years of education: Not on file    Highest education level: Not on file   Occupational History    Not on file   Tobacco Use    Smoking status: Never Smoker    Smokeless tobacco: Never Used   Vaping Use    Vaping Use: Never used   Substance and Sexual Activity    Alcohol use: Yes     Comment: rarely    Drug use: Never    Sexual activity: Not on file   Other Topics Concern    Not on file   Social History Narrative    Not on file     Social Determinants of Health     Financial Resource Strain:     Difficulty of Paying Living Expenses:    Food Insecurity:     Worried About Running Out of Food in the Last Year:     [address] in the Last Year:    Transportation Needs:     Lack of Transportation (Medical):  Lack of Transportation (Non-Medical):    Physical Activity:     Days of Exercise per Week:     Minutes of Exercise per Session:    Stress:     Feeling of Stress :    Social Connections:     Frequency of Communication with Friends and Family:     Frequency of Social Gatherings with Friends and Family:     Attends Yarsanism Services:     Active Member of Clubs or Organizations:     Attends Club or Organization Meetings:     Marital Status:    Intimate Partner Violence:     Fear of Current or Ex-Partner:     Emotionally Abused:     Physically Abused:     Sexually Abused:        History reviewed. No pertinent family history. Allergies:  No known allergies    Home Medications:  Prior to Admission medications    Medication Sig Start Date End Date Taking?  Authorizing Provider   acetaminophen (APAP EXTRA STRENGTH) 500 MG tablet Take 2 tablets by mouth every 6 hours as needed for Pain 8/27/21  Yes Boo Situ, DO   ondansetron (ZOFRAN ODT) 4 MG disintegrating tablet Take 1 tablet by mouth every 8 hours as needed for Nausea 8/27/21  Yes Boo Situ, DO   benzonatate (TESSALON PERLES) 100 MG capsule Take 1 capsule by mouth 3 times daily as needed for Cough 8/27/21 9/3/21 Yes Boo Situ, DO   guaiFENesin-dextromethorphan (ROBITUSSIN DM) 100-10 MG/5ML syrup Take 5 mLs by mouth 3 times daily as needed for Cough 8/27/21 9/6/21 Yes Chon Armendariz, DO   amLODIPine (NORVASC) 5 MG tablet Take 2 tablets by mouth daily 6/11/21  Yes Bean Waldron MD   apixaban (ELIQUIS) 5 MG TABS tablet Take 1 tablet by mouth 2 times daily 6/11/21  Yes Bean Waldron MD   metoprolol succinate (TOPROL XL) 50 MG extended release tablet Take 50 mg by mouth daily 5/13/20  Yes Historical Provider, MD   anastrozole (ARIMIDEX) 1 MG tablet Take 1 mg by mouth daily   Yes Historical Provider, MD       REVIEW OF SYSTEMS    (2-9 systems for level 4, 10 or more for level 5)      Review of Systems   Constitutional: Positive for chills, fatigue and fever. Eyes: Negative for discharge and redness. Respiratory: Positive for shortness of breath. Cardiovascular: Negative for chest pain. Gastrointestinal: Negative for abdominal pain. Genitourinary: Negative for flank pain. Musculoskeletal: Positive for myalgias. Skin: Negative for color change and rash. Allergic/Immunologic: Negative for environmental allergies. Neurological: Positive for headaches. Psychiatric/Behavioral: Negative for agitation and confusion. PHYSICAL EXAM   (up to 7 for level 4, 8 or more for level 5)     INITIAL VITALS:    height is 5' 11\" (1.803 m) and weight is 198 lb (89.8 kg). Her oral temperature is 98.2 °F (36.8 °C). Her blood pressure is 106/71 and her pulse is 89. Her respiration is 18 and oxygen saturation is 95%. Physical Exam  Vitals and nursing note reviewed. Constitutional:       Appearance: She is well-developed. HENT:      Head: Normocephalic and atraumatic. Nose: Nose normal.      Mouth/Throat:      Mouth: Mucous membranes are moist.   Eyes:      General: No scleral icterus. Conjunctiva/sclera: Conjunctivae normal.      Pupils: Pupils are equal, round, and reactive to light. Neck:      Trachea: No tracheal deviation. Cardiovascular:      Rate and Rhythm: Normal rate and regular rhythm. Heart sounds: Normal heart sounds. No murmur heard. No friction rub. No gallop.     Pulmonary:      Effort: Pulmonary effort is normal. No respiratory distress. Breath sounds: Normal breath sounds. No wheezing or rales. Abdominal:      General: There is no distension. Palpations: Abdomen is soft. There is no mass. Tenderness: There is no abdominal tenderness. There is no guarding. Hernia: No hernia is present. Musculoskeletal:         General: Normal range of motion. Cervical back: Neck supple. Skin:     General: Skin is warm and dry. Findings: No erythema or rash. Neurological:      Mental Status: She is alert and oriented to person, place, and time.       Comments: No facial asymmetry, no aphasia, no dysarthria, EOMI, PERRLA; 5/5 strength in upper and lower extremities b/l; no changes in sensation     Psychiatric:         Behavior: Behavior normal.         DIFFERENTIAL  DIAGNOSIS     PLAN (LABS / IMAGING / EKG):  Orders Placed This Encounter   Procedures    XR CHEST PORTABLE    CT CHEST PULMONARY EMBOLISM W CONTRAST    Basic Metabolic Panel    Troponin    CBC Auto Differential    D-Dimer, Quantitative    Brain Natriuretic Peptide    Troponin    EKG 12 Lead       MEDICATIONS ORDERED:  Orders Placed This Encounter   Medications    benzonatate (TESSALON) capsule 100 mg    acetaminophen (TYLENOL) tablet 1,000 mg    lactated ringers bolus    magnesium sulfate 1000 mg in dextrose 5% 100 mL IVPB    iopamidol (ISOVUE-370) 76 % injection 75 mL    0.9 % sodium chloride bolus    DISCONTD: sodium chloride flush 0.9 % injection 10 mL    potassium chloride (KLOR-CON M) extended release tablet 40 mEq    acetaminophen (APAP EXTRA STRENGTH) 500 MG tablet     Sig: Take 2 tablets by mouth every 6 hours as needed for Pain     Dispense:  60 tablet     Refill:  0    ondansetron (ZOFRAN ODT) 4 MG disintegrating tablet     Sig: Take 1 tablet by mouth every 8 hours as needed for Nausea     Dispense:  12 tablet     Refill:  0    benzonatate (TESSALON PERLES) 100 MG capsule     Sig: Take 1 capsule by mouth 3 times daily as needed for Cough     Dispense:  21 capsule     Refill:  0    guaiFENesin-dextromethorphan (ROBITUSSIN DM) 100-10 MG/5ML syrup     Sig: Take 5 mLs by mouth 3 times daily as needed for Cough     Dispense:  120 mL     Refill:  0       DDX: ACS versus PE versus electrolyte abnormality versus dehydration    Initial MDM/Plan: 77 y.o. female who presents with 1 week prior positive Covid test with multiple symptoms. Given patient's age and comorbidities will check ACS and PE work-up. Attempt to rule out with D-dimer may require PE scan. Otherwise well-appearing. Saturating normally on room air. Symptomatic treatment. Anticipate discharge. DIAGNOSTIC RESULTS / EMERGENCY DEPARTMENT COURSE / MDM     LABS:  Labs Reviewed   BASIC METABOLIC PANEL - Abnormal; Notable for the following components:       Result Value    Glucose 106 (*)     BUN 33 (*)     CREATININE 1.42 (*)     Bun/Cre Ratio 23 (*)     Potassium 3.3 (*)     GFR Non- 36 (*)     GFR  44 (*)     All other components within normal limits   TROPONIN - Abnormal; Notable for the following components:    Troponin, High Sensitivity 20 (*)     All other components within normal limits   CBC WITH AUTO DIFFERENTIAL - Abnormal; Notable for the following components:    RBC 3.73 (*)     Hemoglobin 10.7 (*)     Hematocrit 33.9 (*)     Seg Neutrophils 73 (*)     Lymphocytes 14 (*)     Immature Granulocytes 1 (*)     Absolute Lymph # 0.84 (*)     All other components within normal limits   BRAIN NATRIURETIC PEPTIDE - Abnormal; Notable for the following components:    Pro- (*)     All other components within normal limits   TROPONIN - Abnormal; Notable for the following components:    Troponin, High Sensitivity 18 (*)     All other components within normal limits   D-DIMER, QUANTITATIVE         RADIOLOGY:  CT CHEST PULMONARY EMBOLISM W CONTRAST   Final Result   1. No evidence of acute pulmonary embolism.    2. Patchy ground-glass and interstitial opacities bilaterally, which can be   seen with atypical/viral pneumonia. RECOMMENDATIONS:   1 cm incidental thyroid nodule. No follow-up imaging is recommended. Reference: J Am Jamila Radiol. 2015 Feb;12(2): 143-50         XR CHEST PORTABLE   Final Result   Patchy infiltrates within the lungs bilaterally. Multifocal pneumonia should   be primarily considered, and consider both typical and atypical etiologies,   including viral pneumonia. EKG   Rhythm: normal sinus    Rate: normal   Axis: normal  Conduction: normal  ST Segments: no acute change  T Waves: no acute change  Q Waves: no acute change    Clinical Impression: no acute change, but this is a nonspecific EKG. All EKG's are interpreted by the Emergency Department Physician who either signs or Co-signs this chart in the absence of a cardiologist.    EMERGENCY DEPARTMENT COURSE:  ED Course as of Aug 29 2034   Fri Aug 27, 2021   1543 D-dimer elevated. Reportedly on Eliquis for history of AT fib. Has been off of her Eliquis for approximately a week. Will get CT imaging assess for PE.    [MS]       4841 Second troponin I hour.    [MS]      ED Course User Index  [MS] Flori Cornejo,      Patient does appear to have some CKD. Creatinine 1.4 not off from baseline 1.2. GFR 44 okay for scan. Did give some fluid boluses. Patient received PE study and negative for any PE atypical pneumonia. In light of patient's Covid diagnosis most likely viral rash has had no fever here. No sputum production. Did encourage her to restart Eliquis as she does have history of underlying A. fib however is not A. fib currently. Symptomatic treatment. 2-week quarantine from symptom onset. · Based on the low acuity of concerning symptoms and improvement of symptoms, patient will be discharged with follow up and prescription information listed in the Disposition section.   · Patient states they will follow-up with primary care physician and/or return to the emergency department should they experience a change or worsening of symptoms. · Patient will be discharged with resources: summary of visit, instructions, follow-up information, prescriptions if necessary. · Patient/ family instructed to read discharge paperwork. All of their questions and concerns were addressed. · Suspicion for any acute life-threatening processes is low. Patient voices understanding of plan. PROCEDURES:  None    CONSULTS:  None    CRITICAL CARE:  0    FINAL IMPRESSION      1. COVID-19    2. Shortness of breath    3.  Dehydration          DISPOSITION / PLAN     DISPOSITION Decision To Discharge 08/27/2021 07:04:54 PM        PATIENTREFERRED TO:  Jaye Molina MD  [address] [facility]  [place] [phone]    Schedule an appointment as soon as possible for a visit in 1 week        DISCHARGE MEDICATIONS:  Discharge Medication List as of 8/27/2021  7:09 PM      START taking these medications    Details   acetaminophen (APAP EXTRA STRENGTH) 500 MG tablet Take 2 tablets by mouth every 6 hours as needed for Pain, Disp-60 tablet, R-0Print      ondansetron (ZOFRAN ODT) 4 MG disintegrating tablet Take 1 tablet by mouth every 8 hours as needed for Nausea, Disp-12 tablet, R-0Print      benzonatate (TESSALON PERLES) 100 MG capsule Take 1 capsule by mouth 3 times daily as needed for Cough, Disp-21 capsule, R-0Print      guaiFENesin-dextromethorphan (ROBITUSSIN DM) 100-10 MG/5ML syrup Take 5 mLs by mouth 3 times daily as needed for Cough, Disp-120 mL, R-0Print             Sydnee Iglesias DO  EmergencyMedicine Attending    (Please note that portions of this note were completed with a voice recognition program.  Efforts were made to edit the dictations but occasionally words are mis-transcribed.)       Sydnee Iglesias DO  08/29/21 2044

## 2021-11-17 ENCOUNTER — IMMUNIZATION (OUTPATIENT)
Dept: FAMILY MEDICINE CLINIC | Age: 76
End: 2021-11-17
Payer: MEDICARE

## 2021-11-17 PROCEDURE — 91306 COVID-19, MODERNA BOOSTER VACCINE 0.25ML DOSE: CPT | Performed by: INTERNAL MEDICINE

## 2021-11-17 PROCEDURE — 0064A COVID-19, MODERNA BOOSTER VACCINE 0.25ML DOSE: CPT | Performed by: INTERNAL MEDICINE

## 2022-03-17 ENCOUNTER — TELEPHONE (OUTPATIENT)
Dept: PODIATRY | Age: 77
End: 2022-03-17

## 2022-03-17 ENCOUNTER — OFFICE VISIT (OUTPATIENT)
Dept: PODIATRY | Age: 77
End: 2022-03-17
Payer: COMMERCIAL

## 2022-03-17 VITALS — HEIGHT: 71 IN | RESPIRATION RATE: 16 BRPM | BODY MASS INDEX: 27.72 KG/M2 | WEIGHT: 198 LBS

## 2022-03-17 DIAGNOSIS — R60.0 EDEMA OF LOWER EXTREMITY: Primary | ICD-10-CM

## 2022-03-17 DIAGNOSIS — M79.676 PAIN OF FIFTH TOE: ICD-10-CM

## 2022-03-17 DIAGNOSIS — M86.9 OSTEOMYELITIS OF FIFTH TOE OF LEFT FOOT (HCC): ICD-10-CM

## 2022-03-17 DIAGNOSIS — M79.672 LEFT FOOT PAIN: ICD-10-CM

## 2022-03-17 PROCEDURE — 1123F ACP DISCUSS/DSCN MKR DOCD: CPT | Performed by: PODIATRIST

## 2022-03-17 PROCEDURE — 4040F PNEUMOC VAC/ADMIN/RCVD: CPT | Performed by: PODIATRIST

## 2022-03-17 PROCEDURE — 99213 OFFICE O/P EST LOW 20 MIN: CPT | Performed by: PODIATRIST

## 2022-03-17 PROCEDURE — G8400 PT W/DXA NO RESULTS DOC: HCPCS | Performed by: PODIATRIST

## 2022-03-17 PROCEDURE — 3017F COLORECTAL CA SCREEN DOC REV: CPT | Performed by: PODIATRIST

## 2022-03-17 PROCEDURE — G8484 FLU IMMUNIZE NO ADMIN: HCPCS | Performed by: PODIATRIST

## 2022-03-17 PROCEDURE — G8427 DOCREV CUR MEDS BY ELIG CLIN: HCPCS | Performed by: PODIATRIST

## 2022-03-17 PROCEDURE — G8417 CALC BMI ABV UP PARAM F/U: HCPCS | Performed by: PODIATRIST

## 2022-03-17 PROCEDURE — 1036F TOBACCO NON-USER: CPT | Performed by: PODIATRIST

## 2022-03-17 PROCEDURE — 1090F PRES/ABSN URINE INCON ASSESS: CPT | Performed by: PODIATRIST

## 2022-03-17 NOTE — TELEPHONE ENCOUNTER
Patient called in requesting a C9 be sent in to be approved for an MRI. Please advise and address thank you!

## 2022-03-17 NOTE — PROGRESS NOTES
[address] [facility] PODIATRY [facility]  [address] [facility] [address]  Dept: [phone]  Dept Fax: [phone]    RETURN PATIENT PROGRESS NOTE  Date of patient's visit: 3/17/2022  Patient's Name:  Tian King YOB: 1945            Patient Care Team:  Shira Ortega MD as PCP - Abraham Mathews DPM as Physician (Podiatry)       Tian King 77 y.o. female that presents for follow-up of   Chief Complaint   Patient presents with    Foot Swelling    Follow-up     Pt's primary care physician is Shira Ortega MD last seen 02/28/2022  Symptoms began 1 year(s) ago and are unchanged . Patient relates pain is present . Pain is rated 2 out of 10 and is described as moderate. Treatments prior to today's visit include: previous left foot surgery . Currently denies F/C/N/V. Allergies   Allergen Reactions    No Known Allergies        Past Medical History:   Diagnosis Date    Atrial fibrillation ([facility]     Cancer ([facility]     left breast cancer    Hypertension     SVT (supraventricular tachycardia) ([facility] 2009       Prior to Admission medications    Medication Sig Start Date End Date Taking?  Authorizing Provider   apixaban (ELIQUIS) 5 MG TABS tablet Take 1 tablet by mouth 2 times daily 6/11/21  Yes Allegra Opitz, MD   metoprolol succinate (TOPROL XL) 50 MG extended release tablet Take 50 mg by mouth daily 5/13/20  Yes Historical Provider, MD   anastrozole (ARIMIDEX) 1 MG tablet Take 1 mg by mouth daily   Yes Historical Provider, MD   acetaminophen (APAP EXTRA STRENGTH) 500 MG tablet Take 2 tablets by mouth every 6 hours as needed for Pain  Patient not taking: Reported on 3/17/2022 8/27/21   Jessa Wheeler,    ondansetron (ZOFRAN ODT) 4 MG disintegrating tablet Take 1 tablet by mouth every 8 hours as needed for Nausea  Patient not taking: Reported on 3/17/2022 8/27/21   Jessa Wheeler, DO   amLODIPine (NORVASC) 5 MG tablet Take 2 tablets by mouth daily  Patient not taking: Reported on 3/17/2022 6/11/21   Hanh Rain MD       Review of Systems    Review of Systems:  History obtained from chart review and the patient  General ROS: negative for - chills, fatigue, fever, night sweats or weight gain  Constitutional: Negative for chills, diaphoresis, fatigue, fever and unexpected weight change. Musculoskeletal: Positive for arthralgias, gait problem and joint swelling. Neurological ROS: negative for - behavioral changes, confusion, headaches or seizures. Negative for weakness and numbness. Dermatological ROS: negative for - mole changes, rash  Cardiovascular: Negative for leg swelling. Gastrointestinal: Negative for constipation, diarrhea, nausea and vomiting. Lower Extremity Physical Examination:     Vitals:   Vitals:    03/17/22 1548   Resp: 16     General: AAO x 3 in NAD. Dermatologic Exam:  Skin lesion/ulceration Absent . Skin No rashes or nodules noted. .       Musculoskeletal:     1st MPJ ROM decreased, Bilateral.  Muscle strength 5/5, Bilateral.  Pain present upon palpation of left 5th toe with stiffness and varus deformity . Medial longitudinal arch, Bilateral WNL.   Ankle ROM WNL,Bilateral.    Dorsally contracted digits absent digits 1-5 Bilateral.     Vascular: DP and PT pulses palpable 2/4, Bilateral.  CFT <3 seconds, Bilateral.  Hair growth present to the level of the digits, Bilateral.  Edema absent, Bilateral.  Varicosities absent, Bilateral. Erythema absent, Bilateral    Neurological: Sensation intact to light touch to level of digits, Bilateral.  Protective sensation intact 10/10 sites via 5.07/10g [place]-You Monofilament, Bilateral.  negative Tinel's, Bilateral.  negative Valleix sign, Bilateral.      Integument: Warm, dry, supple, Bilateral.  Open lesion absent, Bilateral.  Interdigital maceration absent to web spaces 1-4, Bilateral.  Nails are normal in length, thickness and color 1-5 bilateral. Fissures absent, Bilateral.     X rays left foot 3 views: Bony remodeling of the 5th toe with possible osteomyelitis     Asessment: Patient is a 77 y.o. female with:    Diagnosis Orders   1. Edema of lower extremity     2. Pain of fifth toe  XR FOOT LEFT (MIN 3 VIEWS)    MRI FOOT LEFT WO CONTRAST   3. Left foot pain  XR FOOT LEFT (MIN 3 VIEWS)    MRI FOOT LEFT WO CONTRAST   4. Osteomyelitis of fifth toe of left foot ([address]  MRI FOOT LEFT WO CONTRAST       Plan: Patient examined and evaluated. Current condition and treatment options discussed in detail. Advised pt to her ocnditon and the x ray findings of the left foot. With the possible bone infection will need MRI. since we have tried NSAID, immobilization,  RICE therapy physical therapy and the symptoms have not improved we will need to order an MRI of the affected limb to assess the area      . Verbal and written instructions given to patient. Contact office with any questions/problems/concerns. No orders of the defined types were placed in this encounter. No orders of the defined types were placed in this encounter. RTC in 1week(s).     3/17/2022      Electronically signed by Katelynn Lizama DPM on 3/17/2022 at 3:50 PM  3/17/2022

## 2022-03-18 NOTE — TELEPHONE ENCOUNTER
Called patient informed will send Medco-14 and C-9 for office visit and ordered MRI.  Patient verbalized understanding, Penny Aparicio

## 2022-04-15 ENCOUNTER — HOSPITAL ENCOUNTER (OUTPATIENT)
Dept: MRI IMAGING | Age: 77
Discharge: HOME | End: 2022-04-17
Payer: COMMERCIAL

## 2022-04-15 DIAGNOSIS — M86.9 OSTEOMYELITIS OF FIFTH TOE OF LEFT FOOT (HCC): ICD-10-CM

## 2022-04-15 DIAGNOSIS — M79.672 LEFT FOOT PAIN: ICD-10-CM

## 2022-04-15 DIAGNOSIS — M79.676 PAIN OF FIFTH TOE: ICD-10-CM

## 2022-04-15 PROCEDURE — 73718 MRI LOWER EXTREMITY W/O DYE: CPT

## 2022-04-26 ENCOUNTER — OFFICE VISIT (OUTPATIENT)
Dept: PODIATRY | Age: 77
End: 2022-04-26
Payer: COMMERCIAL

## 2022-04-26 VITALS — BODY MASS INDEX: 27.72 KG/M2 | HEIGHT: 71 IN | WEIGHT: 198 LBS

## 2022-04-26 DIAGNOSIS — M79.672 LEFT FOOT PAIN: ICD-10-CM

## 2022-04-26 DIAGNOSIS — R60.0 EDEMA OF LOWER EXTREMITY: ICD-10-CM

## 2022-04-26 DIAGNOSIS — M20.5X2 ACQUIRED DIGITI QUINTI VARUS DEFORMITY OF LEFT FOOT: ICD-10-CM

## 2022-04-26 DIAGNOSIS — M79.675 PAIN AND SWELLING OF TOE, LEFT: ICD-10-CM

## 2022-04-26 DIAGNOSIS — M79.676 PAIN OF FIFTH TOE: ICD-10-CM

## 2022-04-26 DIAGNOSIS — R26.2 TROUBLE WALKING: Primary | ICD-10-CM

## 2022-04-26 DIAGNOSIS — M86.9 OSTEOMYELITIS OF FIFTH TOE OF LEFT FOOT (HCC): ICD-10-CM

## 2022-04-26 DIAGNOSIS — M79.89 PAIN AND SWELLING OF TOE, LEFT: ICD-10-CM

## 2022-04-26 PROCEDURE — 99214 OFFICE O/P EST MOD 30 MIN: CPT | Performed by: PODIATRIST

## 2022-04-26 PROCEDURE — 1036F TOBACCO NON-USER: CPT | Performed by: PODIATRIST

## 2022-04-26 PROCEDURE — G8417 CALC BMI ABV UP PARAM F/U: HCPCS | Performed by: PODIATRIST

## 2022-04-26 PROCEDURE — G8427 DOCREV CUR MEDS BY ELIG CLIN: HCPCS | Performed by: PODIATRIST

## 2022-04-26 PROCEDURE — 4040F PNEUMOC VAC/ADMIN/RCVD: CPT | Performed by: PODIATRIST

## 2022-04-26 PROCEDURE — G8400 PT W/DXA NO RESULTS DOC: HCPCS | Performed by: PODIATRIST

## 2022-04-26 PROCEDURE — 1090F PRES/ABSN URINE INCON ASSESS: CPT | Performed by: PODIATRIST

## 2022-04-26 PROCEDURE — 1123F ACP DISCUSS/DSCN MKR DOCD: CPT | Performed by: PODIATRIST

## 2022-04-26 PROCEDURE — 3017F COLORECTAL CA SCREEN DOC REV: CPT | Performed by: PODIATRIST

## 2022-04-26 NOTE — PATIENT INSTRUCTIONS
Schedule a Vaccine  When you qualify to receive the vaccine, call the [facility]) COVID-19 Vaccination Hotline to schedule your appointment or to get additional information about the [facility]) locations which are offering the COVID-19 vaccine. To be 94% effective, it's important that you receive two doses of one of the COVID-19 vaccines. -If you are receiving the Vogt Peter vaccine, your second shot will be scheduled as close to 21 days after the first shot as possible. -If you are receiving the Moderna vaccine, your second shot will be scheduled as close to 28 days after the first shot as possible. [facility]) COVID-19 Vaccination Hotline: [phone]    Links to [facility]) website and [facility] website:    [email or web address]/[email or web address]    [email or web address]

## 2022-04-26 NOTE — PROGRESS NOTES
[address] [facility] PODIATRY [facility]  [address] [address] [facility] [address]  Dept: [phone]  Dept Fax: [phone]    RETURN PATIENT PROGRESS NOTE  Date of patient's visit: 4/26/2022  Patient's Name:  Devaughn Lal YOB: 1945            Patient Care Team:  Daniel Oconnor MD as PCP - General (Family Medicine)  Angie Alvarado DPM as Physician (Podiatry)       Devaughn Lal 77 y.o. female that presents for follow-up of   Chief Complaint   Patient presents with    Follow Up After Procedure     MRI - completed 4/15/22 Left foot       Symptoms began 1+ year(s) ago and are unchanged . Patient relates pain is Present. Pain is rated 10 out of 10 and is described as constant, severe, excruciating. Treatments prior to today's visit include: previous surgery and podiatry treatment. Currently denies F/C/N/V. Allergies   Allergen Reactions    No Known Allergies        Past Medical History:   Diagnosis Date    [facility]     Cancer ([facility] [facility]     left breast cancer    Hypertension     SVT (supraventricular tachycardia) ([facility] [facility] 2009       Prior to Admission medications    Medication Sig Start Date End Date Taking?  Authorizing Provider   acetaminophen (APAP EXTRA STRENGTH) 500 MG tablet Take 2 tablets by mouth every 6 hours as needed for Pain 8/27/21  Yes Ledora Distad, DO   ondansetron (ZOFRAN ODT) 4 MG disintegrating tablet Take 1 tablet by mouth every 8 hours as needed for Nausea 8/27/21  Yes Ledora Distad, DO   amLODIPine (NORVASC) 5 MG tablet Take 2 tablets by mouth daily 6/11/21  Yes Alysia Dunn MD   apixaban (ELIQUIS) 5 MG TABS tablet Take 1 tablet by mouth 2 times daily 6/11/21  Yes Alysia Dunn MD   metoprolol succinate (TOPROL XL) 50 MG extended release tablet Take 50 mg by mouth daily 5/13/20  Yes Historical Provider, MD   anastrozole (ARIMIDEX) 1 MG tablet Take 1 mg by mouth daily   Yes Historical Provider, MD Review of Systems    Review of Systems:  History obtained from chart review and the patient  General ROS: negative for - chills, fatigue, fever, night sweats or weight gain  Constitutional: Negative for chills, diaphoresis, fatigue, fever and unexpected weight change. Musculoskeletal: Positive for arthralgias, gait problem and joint swelling. Neurological ROS: negative for - behavioral changes, confusion, headaches or seizures. Negative for weakness and numbness. Dermatological ROS: negative for - mole changes, rash  Cardiovascular: Negative for leg swelling. Gastrointestinal: Negative for constipation, diarrhea, nausea and vomiting. Lower Extremity Physical Examination:     Vitals: There were no vitals filed for this visit. General: AAO x 3 in NAD. Dermatologic Exam:  3 cm soft tissue lesion to the left 5th toe at the PIPJ    Musculoskeletal:     1st MPJ ROM decreased, Bilateral.  Muscle strength 5/5, Bilateral.  Pain present upon palpation of left 5th toe at the lateral aspect of the toe. Medial longitudinal arch, Bilateral WNL. Ankle ROM WNL,Bilateral.    Dorsally contracted digits absent digits 1-5 Bilateral.     Vascular: DP and PT pulses palpable 2/4, Bilateral.  CFT <3 seconds, Bilateral.  Hair growth present to the level of the digits, Bilateral.  Edema absent, Bilateral.  Varicosities absent, Bilateral. Erythema absent, Bilateral    Neurological: Sensation intact to light touch to level of digits, Bilateral.  Protective sensation intact 10/10 sites via 5.07/10g [place] Monofilament, Bilateral.  negative Tinel's, Bilateral.  negative Valleix sign, Bilateral.      Integument: Warm, dry, supple, Bilateral.  Open lesion absent, Bilateral.  Interdigital maceration absent to web spaces 1-4, Bilateral.  Nails are normal in length, thickness and color 1-5 bilateral.  Fissures absent, Bilateral.       Left foot MRI  mpression   1.  No marrow signal changes in the 5th digit or forefoot to suggest acute   osteomyelitis. 2. Otherwise no acute abnormality in the forefoot. 3. Mild 1st MTP joint and TMT joint degenerative changes.           Asessment: Patient is a 77 y.o. female with:    Diagnosis Orders   1. Trouble walking  Handicap Placard MISC   2. Pain and swelling of toe, left  Handicap Placard MISC   3. Left foot pain  Handicap Placard MISC   4. Pain of fifth toe  Handicap Placard MISC   5. Osteomyelitis of fifth toe of left foot ([address]  Handicap Placard MISC   6. Edema of lower extremity  Handicap Placard MISC   7. 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 Current condition and treatment options discussed in detail. Advised pt to the mri and x ray findings and reviewed with the patient today. All labs were reviewed and all imagining including the above findings were reviewed prior to the patients arrival and with the patient today      Time was spent educating the patient and their families/caregivers on proper care of the feet and ankles. All the above diagnosis were addressed at todays visit and all questions were answered. I had a long discussion today with the patient about the likely diagnosis and its natural history, physical exam and imaging findings, as well as treatment options. We discussed both surgical and nonsurgical treatments, including risks and benefits. From a nonoperative standpoint, we discussed rest/activity modification, NSAIDs/Acetaminophen/topical anesthetics, orthotics, bracing/immobilization, and physical therapy. Surgically, we discussed middle phalangectomy vs revision of hammertoe to the left 5th toe. I discussed in detail the procedure. He/She was in full agreement and understood risks. The reason for surgery is due to unsuccessful non-operative treatment and/or conservative treatment is not a viable option.  It was discussed with the patient that compliance postoperatively is of utmost importance. Any deviation on behalf of the patient will decrease the chances of a successful outcome. Patient acknowledged, understands, and would like to move forward with surgery as discussed. The patient was given a consent outlining the general risk of surgery as well as the specifics to the surgical plan. This was carefully discussed giving all options, indications and contraindications regarding the procedure outlined in the consent. All questions were answered to the patient's satisfaction. The patient signed the consent form confirming complete understanding and acceptance of the risks of stated. I specifically stated and inquired if the patient understands and accepts the risks of surgery including infection, failure, prolonged pain, swelling, numbness, recurrence, limited mobility,painful scar, RSDS, overcorrection, under-correction, and loss of limb/life. Death, bleeding, blood clots in the veins or lungs, tendon or blood vessel disturbance, bony conditions, continued pain,stiffness, weakness, and limited function. These were all listed on the consent. Additionally, the postoperative course and treatment was outlined for the patient. Discussion consisted of postoperatively the patient needs to keep the foot elevated for at least the first initial two weeks. I have encouraged movements such as moving from the bed to the sofa or recliner, to the kitchen and the bathroom; quick bursts of movement with the foot elevated. Longstanding periods of time such as cooking, cleaning, and shopping are not permitted. I reminded the patient that there are only two reasons to have surgery. That being, if their function is impaired and also if they are having pain. If they can answer yes to both these questions, I will move forward with surgery. If they can not, there is no reason to proceed with surgical intervention.     Pt does elect to have the procedure above    A total of 35 minutes was spent with this patients encounter  . Verbal and written instructions given to patient. Contact office with any questions/problems/concerns. No orders of the defined types were placed in this encounter. No orders of the defined types were placed in this encounter.        RTC in 1week(s) post op .    4/26/2022      Electronically signed by Raven Earl DPM on 4/26/2022 at 8:47 AM  4/26/2022

## 2022-05-11 ENCOUNTER — HOSPITAL ENCOUNTER (EMERGENCY)
Age: 77
Discharge: HOME | End: 2022-05-11
Attending: EMERGENCY MEDICINE
Payer: MEDICARE

## 2022-05-11 ENCOUNTER — APPOINTMENT (OUTPATIENT)
Dept: GENERAL RADIOLOGY | Age: 77
End: 2022-05-11
Payer: MEDICARE

## 2022-05-11 VITALS
HEIGHT: 71 IN | DIASTOLIC BLOOD PRESSURE: 69 MMHG | BODY MASS INDEX: 25.48 KG/M2 | WEIGHT: 182 LBS | HEART RATE: 74 BPM | OXYGEN SATURATION: 98 % | RESPIRATION RATE: 13 BRPM | TEMPERATURE: 97.5 F | SYSTOLIC BLOOD PRESSURE: 112 MMHG

## 2022-05-11 DIAGNOSIS — I47.1 PAROXYSMAL SUPRAVENTRICULAR TACHYCARDIA (HCC): Primary | ICD-10-CM

## 2022-05-11 LAB
ABSOLUTE EOS #: 0.19 K/UL (ref 0–0.44)
ABSOLUTE IMMATURE GRANULOCYTE: 0.01 K/UL (ref 0–0.3)
ABSOLUTE LYMPH #: 2.21 K/UL (ref 1.1–3.7)
ABSOLUTE MONO #: 0.44 K/UL (ref 0.1–1.2)
ANION GAP SERPL CALCULATED.3IONS-SCNC: 15 MMOL/L (ref 9–17)
BASOPHILS # BLD: 1 % (ref 0–2)
BASOPHILS ABSOLUTE: 0.06 K/UL (ref 0–0.2)
BUN BLDV-MCNC: 28 MG/DL (ref 8–23)
BUN/CREAT BLD: 18 (ref 9–20)
CALCIUM SERPL-MCNC: 9.4 MG/DL (ref 8.6–10.4)
CHLORIDE BLD-SCNC: 106 MMOL/L (ref 98–107)
CO2: 20 MMOL/L (ref 20–31)
CREAT SERPL-MCNC: 1.59 MG/DL (ref 0.5–0.9)
EOSINOPHILS RELATIVE PERCENT: 4 % (ref 1–4)
GFR AFRICAN AMERICAN: 38 ML/MIN
GFR NON-AFRICAN AMERICAN: 32 ML/MIN
GFR SERPL CREATININE-BSD FRML MDRD: ABNORMAL ML/MIN/{1.73_M2}
GLUCOSE BLD-MCNC: 107 MG/DL (ref 70–99)
HCT VFR BLD CALC: 36.6 % (ref 36.3–47.1)
HEMOGLOBIN: 11.9 G/DL (ref 11.9–15.1)
IMMATURE GRANULOCYTES: 0 %
LYMPHOCYTES # BLD: 46 % (ref 24–43)
MCH RBC QN AUTO: 29.3 PG (ref 25.2–33.5)
MCHC RBC AUTO-ENTMCNC: 32.5 G/DL (ref 28.4–34.8)
MCV RBC AUTO: 90.1 FL (ref 82.6–102.9)
MONOCYTES # BLD: 9 % (ref 3–12)
MYOGLOBIN: 107 NG/ML (ref 25–58)
NRBC AUTOMATED: 0 PER 100 WBC
PDW BLD-RTO: 13.3 % (ref 11.8–14.4)
PLATELET # BLD: 227 K/UL (ref 138–453)
PMV BLD AUTO: 10.5 FL (ref 8.1–13.5)
POTASSIUM SERPL-SCNC: 3.8 MMOL/L (ref 3.7–5.3)
RBC # BLD: 4.06 M/UL (ref 3.95–5.11)
SEG NEUTROPHILS: 40 % (ref 36–65)
SEGMENTED NEUTROPHILS ABSOLUTE COUNT: 1.97 K/UL (ref 1.5–8.1)
SODIUM BLD-SCNC: 141 MMOL/L (ref 135–144)
TROPONIN, HIGH SENSITIVITY: 19 NG/L (ref 0–14)
WBC # BLD: 4.9 K/UL (ref 3.5–11.3)

## 2022-05-11 PROCEDURE — 84484 ASSAY OF TROPONIN QUANT: CPT

## 2022-05-11 PROCEDURE — 80048 BASIC METABOLIC PNL TOTAL CA: CPT

## 2022-05-11 PROCEDURE — 85025 COMPLETE CBC W/AUTO DIFF WBC: CPT

## 2022-05-11 PROCEDURE — 71045 X-RAY EXAM CHEST 1 VIEW: CPT

## 2022-05-11 PROCEDURE — 96374 THER/PROPH/DIAG INJ IV PUSH: CPT

## 2022-05-11 PROCEDURE — 99285 EMERGENCY DEPT VISIT HI MDM: CPT

## 2022-05-11 PROCEDURE — 93005 ELECTROCARDIOGRAM TRACING: CPT | Performed by: EMERGENCY MEDICINE

## 2022-05-11 PROCEDURE — 83874 ASSAY OF MYOGLOBIN: CPT

## 2022-05-11 PROCEDURE — 6360000002 HC RX W HCPCS: Performed by: EMERGENCY MEDICINE

## 2022-05-11 RX ORDER — ADENOSINE 3 MG/ML
6 INJECTION, SOLUTION INTRAVENOUS ONCE
Status: COMPLETED | OUTPATIENT
Start: 2022-05-11 | End: 2022-05-11

## 2022-05-11 RX ADMIN — ADENOSINE 6 MG: 3 INJECTION, SOLUTION INTRAVENOUS at 12:28

## 2022-05-11 ASSESSMENT — PAIN SCALES - GENERAL: PAINLEVEL_OUTOF10: 10

## 2022-05-11 ASSESSMENT — ENCOUNTER SYMPTOMS
EYE DISCHARGE: 0
EYE REDNESS: 0
CONSTIPATION: 0
ABDOMINAL PAIN: 0
COUGH: 0
DIARRHEA: 0
NAUSEA: 1
COLOR CHANGE: 0
FACIAL SWELLING: 0
VOMITING: 0
SHORTNESS OF BREATH: 0

## 2022-05-11 ASSESSMENT — PAIN DESCRIPTION - LOCATION: LOCATION: NECK

## 2022-05-11 ASSESSMENT — PAIN - FUNCTIONAL ASSESSMENT: PAIN_FUNCTIONAL_ASSESSMENT: 0-10

## 2022-05-11 ASSESSMENT — PAIN DESCRIPTION - DESCRIPTORS: DESCRIPTORS: ACHING

## 2022-05-11 ASSESSMENT — PAIN DESCRIPTION - FREQUENCY: FREQUENCY: CONTINUOUS
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
## 2022-05-12 LAB
EKG ATRIAL RATE: 86 BPM
EKG P AXIS: 69 DEGREES
EKG P-R INTERVAL: 162 MS
EKG Q-T INTERVAL: 324 MS
EKG Q-T INTERVAL: 428 MS
EKG QRS DURATION: 104 MS
EKG QRS DURATION: 166 MS
EKG QTC CALCULATION (BAZETT): 512 MS
EKG QTC CALCULATION (BAZETT): 546 MS
EKG R AXIS: 55 DEGREES
EKG R AXIS: 6 DEGREES
EKG T AXIS: 33 DEGREES
EKG T AXIS: 90 DEGREES
EKG VENTRICULAR RATE: 171 BPM
EKG VENTRICULAR RATE: 86 BPM

## 2022-06-09 ENCOUNTER — APPOINTMENT (OUTPATIENT)
Dept: GENERAL RADIOLOGY | Age: 77
End: 2022-06-09
Payer: MEDICARE

## 2022-06-09 ENCOUNTER — HOSPITAL ENCOUNTER (EMERGENCY)
Age: 77
Discharge: HOME | End: 2022-06-09
Attending: EMERGENCY MEDICINE
Payer: MEDICARE

## 2022-06-09 VITALS
HEART RATE: 89 BPM | RESPIRATION RATE: 15 BRPM | SYSTOLIC BLOOD PRESSURE: 123 MMHG | DIASTOLIC BLOOD PRESSURE: 82 MMHG | OXYGEN SATURATION: 99 % | TEMPERATURE: 98.2 F | BODY MASS INDEX: 25.1 KG/M2 | WEIGHT: 180 LBS

## 2022-06-09 DIAGNOSIS — I47.1 PAROXYSMAL SUPRAVENTRICULAR TACHYCARDIA (HCC): Primary | ICD-10-CM

## 2022-06-09 LAB
ABSOLUTE EOS #: 0.13 K/UL (ref 0–0.44)
ABSOLUTE IMMATURE GRANULOCYTE: <0.03 K/UL (ref 0–0.3)
ABSOLUTE LYMPH #: 1.71 K/UL (ref 1.1–3.7)
ABSOLUTE MONO #: 0.31 K/UL (ref 0.1–1.2)
ANION GAP SERPL CALCULATED.3IONS-SCNC: 16 MMOL/L (ref 9–17)
BASOPHILS # BLD: 1 % (ref 0–2)
BASOPHILS ABSOLUTE: 0.05 K/UL (ref 0–0.2)
BUN BLDV-MCNC: 19 MG/DL (ref 8–23)
CALCIUM SERPL-MCNC: 9.5 MG/DL (ref 8.6–10.4)
CHLORIDE BLD-SCNC: 105 MMOL/L (ref 98–107)
CO2: 19 MMOL/L (ref 20–31)
CREAT SERPL-MCNC: 1.16 MG/DL (ref 0.5–0.9)
EOSINOPHILS RELATIVE PERCENT: 3 % (ref 1–4)
GFR AFRICAN AMERICAN: 55 ML/MIN
GFR NON-AFRICAN AMERICAN: 46 ML/MIN
GFR SERPL CREATININE-BSD FRML MDRD: ABNORMAL ML/MIN/{1.73_M2}
GLUCOSE BLD-MCNC: 106 MG/DL (ref 70–99)
HCT VFR BLD CALC: 36.3 % (ref 36.3–47.1)
HEMOGLOBIN: 11.6 G/DL (ref 11.9–15.1)
IMMATURE GRANULOCYTES: 0 %
LYMPHOCYTES # BLD: 43 % (ref 24–43)
MAGNESIUM: 1.9 MG/DL (ref 1.6–2.6)
MCH RBC QN AUTO: 29.3 PG (ref 25.2–33.5)
MCHC RBC AUTO-ENTMCNC: 32 G/DL (ref 28.4–34.8)
MCV RBC AUTO: 91.7 FL (ref 82.6–102.9)
MONOCYTES # BLD: 8 % (ref 3–12)
NRBC AUTOMATED: 0 PER 100 WBC
PDW BLD-RTO: 13.2 % (ref 11.8–14.4)
PLATELET # BLD: 198 K/UL (ref 138–453)
PMV BLD AUTO: 11 FL (ref 8.1–13.5)
POTASSIUM SERPL-SCNC: 3.4 MMOL/L (ref 3.7–5.3)
PRO-BNP: 355 PG/ML
RBC # BLD: 3.96 M/UL (ref 3.95–5.11)
SEG NEUTROPHILS: 45 % (ref 36–65)
SEGMENTED NEUTROPHILS ABSOLUTE COUNT: 1.78 K/UL (ref 1.5–8.1)
SODIUM BLD-SCNC: 140 MMOL/L (ref 135–144)
TROPONIN, HIGH SENSITIVITY: 18 NG/L (ref 0–14)
TSH SERPL DL<=0.05 MIU/L-ACNC: 1.44 UIU/ML (ref 0.3–5)
WBC # BLD: 4 K/UL (ref 3.5–11.3)

## 2022-06-09 PROCEDURE — 80048 BASIC METABOLIC PNL TOTAL CA: CPT

## 2022-06-09 PROCEDURE — 85025 COMPLETE CBC W/AUTO DIFF WBC: CPT

## 2022-06-09 PROCEDURE — 83735 ASSAY OF MAGNESIUM: CPT

## 2022-06-09 PROCEDURE — 83880 ASSAY OF NATRIURETIC PEPTIDE: CPT

## 2022-06-09 PROCEDURE — 99285 EMERGENCY DEPT VISIT HI MDM: CPT

## 2022-06-09 PROCEDURE — 93005 ELECTROCARDIOGRAM TRACING: CPT | Performed by: STUDENT IN AN ORGANIZED HEALTH CARE EDUCATION/TRAINING PROGRAM

## 2022-06-09 PROCEDURE — 84484 ASSAY OF TROPONIN QUANT: CPT

## 2022-06-09 PROCEDURE — 71046 X-RAY EXAM CHEST 2 VIEWS: CPT

## 2022-06-09 PROCEDURE — 84443 ASSAY THYROID STIM HORMONE: CPT

## 2022-06-09 ASSESSMENT — PAIN - FUNCTIONAL ASSESSMENT: PAIN_FUNCTIONAL_ASSESSMENT: NONE - DENIES PAIN

## 2022-06-09 NOTE — ED PROVIDER NOTES
[facility]     Emergency Department     Faculty Attestation    I performed a history and physical examination of the patient and discussed management with the resident. I reviewed the resident´s note and agree with the documented findings and plan of care. Any areas of disagreement are noted on the chart. I was personally present for the key portions of any procedures. I have documented in the chart those procedures where I was not present during the key portions. I have reviewed the emergency nurses triage note. I agree with the chief complaint, past medical history, past surgical history, allergies, medications, social and family history as documented unless otherwise noted below. For Physician Assistant/ Nurse Practitioner cases/documentation I have personally evaluated this patient and have completed at least one if not all key elements of the E/M (history, physical exam, and MDM). Additional findings are as noted. History of PSVT, converted by first responders with Adenocard, patient states she feels fine now. On exam chest clear, heart regular rate and rhythm, no lower extremity pain or swelling on examination.   Equal pulses at the wrist.       EKG Interpretation    Interpreted by emergency department physician    Rhythm: normal sinus   Rate: normal/90  Axis: normal -16  Ectopy: none  Conduction: QT corrected 511 ms  ST Segments: no acute change  T Waves: no acute change  Q Waves: none      Clinical Impression: Abnormal EKG    KURTIS Preston MD  06/09/22 [id number]

## 2022-06-09 NOTE — ED PROVIDER NOTES
[address] [facility] ED  Emergency Department Encounter  EmergencyMedicine Resident     Pt Name:Rosmery Garner  MRN: [id number]  [place] 1945  Date of evaluation: 6/9/22  PCP:  Vanessa Ugarte MD    CHIEF COMPLAINT       Chief Complaint   Patient presents with    Tachycardia     was in SVT. EMS gave 6mg adenocard which converted her to NSR       HISTORY OF PRESENT ILLNESS  (Location/Symptom, Timing/Onset, Context/Setting, Quality, Duration, Modifying Factors, Severity.)      Anthony Jaramillo is a 77 y.o. female who presents EMS for concerns of SVT. Patient had a heart rate initially in the 180s, corrected with 6 mg adenosine IV push. Patient has a history of recurrent SVT. States that it felt like her prior episodes. Had unusual sensation in chest \"funny \"feeling no shortness of breath no recent illness no nausea vomiting fever diarrhea. Currently she has no symptoms states that the medication worked. She is on metoprolol and Norvasc. She follows closely with cardiology. PAST MEDICAL / SURGICAL / SOCIAL / FAMILY HISTORY      has a past medical history of Arthritis, Atrial fibrillation ([address] Cancer ([address] Hyperlipidemia, Hypertension, and SVT (supraventricular tachycardia) ([address] has a past surgical history that includes Colonoscopy; Tibia fracture surgery (Right); eye surgery; arthroplasty (Left, 8/5/2020); Breast surgery; and Knee arthroscopy (Right).     Social History     Socioeconomic History    Marital status: Single     Spouse name: Not on file    Number of children: Not on file    Years of education: Not on file    Highest education level: Not on file   Occupational History    Not on file   Tobacco Use    Smoking status: Never Smoker    Smokeless tobacco: Never Used   Vaping Use    Vaping Use: Never used   Substance and Sexual Activity    Alcohol use: Yes     Comment: rarely    Drug use: Never    Sexual activity: Not on file   Other Topics Concern    Not on file Social History Narrative    Not on file     Social Determinants of Health     Financial Resource Strain:     Difficulty of Paying Living Expenses: Not on file   Food Insecurity:     Worried About Running Out of Food in the Last Year: Not on file    Harish of Food in the Last Year: Not on file   Transportation Needs:     Lack of Transportation (Medical): Not on file    Lack of Transportation (Non-Medical): Not on file   Physical Activity:     Days of Exercise per Week: Not on file    Minutes of Exercise per Session: Not on file   Stress:     Feeling of Stress : Not on file   Social Connections:     Frequency of Communication with Friends and Family: Not on file    Frequency of Social Gatherings with Friends and Family: Not on file    Attends Tenri[employer] Services: Not on file    Active Member of [address] [place] [address] [place] or Organizations: Not on file    Attends Club or Organization Meetings: Not on file    Marital Status: Not on file   Intimate Partner Violence:     Fear of Current or Ex-Partner: Not on file    Emotionally Abused: Not on file    Physically Abused: Not on file    Sexually Abused: Not on file   Housing Stability:     Unable to Pay for Housing in the Last Year: Not on file    Number of [place] in the Last Year: Not on file    Unstable Housing in the Last Year: Not on file       History reviewed. No pertinent family history. Allergies:  No known allergies    Home Medications:  Prior to Admission medications    Medication Sig Start Date End Date Taking?  Authorizing Provider   Rosuvastatin Calcium 20 MG CPSP Take 20 mg by mouth daily    Historical Provider, MD   calcium carbonate (CALCIUM 600) 1500 (600 Ca) MG TABS tablet Take 600 mg by mouth daily    Historical Provider, MD   Prenatal MV-Min-Fe Fum-FA-DHA (PRENATAL 1 PO) Take 1 tablet by mouth daily    Historical Provider, MD   Cholecalciferol (VITAMIN D3) 1.25 MG (45347 UT) CAPS Take 1 capsule by mouth daily    Historical Provider, MD Handicap Placard MISC by Does not apply route Temporary use- not to exceed more than 5 years  Patient unable to walk more than 200 feet without needing to stop to rest 4/26/22   Karen Nguyen DPM   acetaminophen (APAP EXTRA STRENGTH) 500 MG tablet Take 2 tablets by mouth every 6 hours as needed for Pain 8/27/21   [facility],    amLODIPine (NORVASC) 5 MG tablet Take 2 tablets by mouth daily 6/11/21   Tae Orourke MD   apixaban (ELIQUIS) 5 MG TABS tablet Take 1 tablet by mouth 2 times daily 6/11/21   Tae Orourke MD   metoprolol succinate (TOPROL XL) 50 MG extended release tablet Take 50 mg by mouth daily 5/13/20   Historical Provider, MD   anastrozole (ARIMIDEX) 1 MG tablet Take 1 mg by mouth daily    Historical Provider, MD       REVIEW OF SYSTEMS    (2-9 systems for level 4, 10 or more for level 5)      Review of Systems   Constitutional: Negative for fever. HENT: Negative for congestion. Eyes: Negative for photophobia. Respiratory: Negative for shortness of breath. Cardiovascular: Negative for chest pain. Gastrointestinal: Negative for abdominal pain and vomiting. Endocrine: Negative for polyuria. Genitourinary: Negative for dysuria. Musculoskeletal: Negative for arthralgias. Skin: Negative for color change. Allergic/Immunologic: Negative for immunocompromised state. Neurological: Negative for dizziness. Hematological: Does not bruise/bleed easily. Psychiatric/Behavioral: Negative for agitation. PHYSICAL EXAM   (up to 7 for level 4, 8 or more for level 5)      INITIAL VITALS:   /82   Pulse 89   Temp 98.2 °F (36.8 °C) (Oral)   Resp 15   Wt 180 lb (81.6 kg)   SpO2 99%   BMI 25.10 kg/m²     Physical Exam  Constitutional:       General: Not in acute distress. Appearance: Normal appearance. Normal weight. Not toxic-appearing. HENT:      Head: Normocephalic and atraumatic.       Nose: Nose normal.      Mouth/Throat: Mucous membranes are moist.  Uvula midline no oropharyngeal edema. Pharynx: Oropharynx is clear. Eyes:      Extraocular Movements: Extraocular movements intact. Conjunctiva/sclera: Conjunctivae normal.      Pupils: Pupils are equal, round, and reactive to light. Neck:      Musculoskeletal: Normal range of motion and neck supple. No neck rigidity. Cardiovascular:      Rate and Rhythm: Normal rate and regular rhythm. Pulses: Normal pulses. Heart sounds: Normal heart sounds. No murmur. Pulmonary:      Effort: Pulmonary effort is normal.      Breath sounds: Normal breath sounds. No wheezing. Abdominal:      General: Abdomen is flat. Bowel sounds are normal.      Tenderness: There is no abdominal tenderness. Musculoskeletal:     Normal range of motion. General: No swelling or tenderness. No LE edema    Skin:     General: Skin is warm. Capillary Refill: Capillary refill takes less than 2 seconds. Coloration: Skin is not jaundiced. Neurological:      General: No focal deficit present. Mental Status: Alert and oriented to person, place, and time. Mental status is at baseline. Motor: No weakness. DIFFERENTIAL  DIAGNOSIS     PLAN (LABS / IMAGING / EKG):  Orders Placed This Encounter   Procedures    XR CHEST (2 VW)    CBC with Auto Differential    Basic Metabolic Panel w/ Reflex to MG    Troponin    Brain Natriuretic Peptide    TSH with Reflex    Magnesium    EKG 12 Lead       MEDICATIONS ORDERED:  No orders of the defined types were placed in this encounter.           DIAGNOSTIC RESULTS / EMERGENCY DEPARTMENT COURSE / MDM     LABS:  Results for orders placed or performed during the hospital encounter of 06/09/22   CBC with Auto Differential   Result Value Ref Range    WBC 4.0 3.5 - 11.3 k/uL    RBC 3.96 3.95 - 5.11 m/uL    Hemoglobin 11.6 (L) 11.9 - 15.1 g/dL    Hematocrit 36.3 36.3 - 47.1 %    MCV 91.7 82.6 - 102.9 fL    MCH 29.3 25.2 - 33.5 pg    MCHC 32.0 28.4 - 34.8 g/dL RDW 13.2 11.8 - 14.4 %    Platelets 414 033 - 471 k/uL    MPV 11.0 8.1 - 13.5 fL    NRBC Automated 0.0 0.0 per 100 WBC    Seg Neutrophils 45 36 - 65 %    Lymphocytes 43 24 - 43 %    Monocytes 8 3 - 12 %    Eosinophils % 3 1 - 4 %    Basophils 1 0 - 2 %    Immature Granulocytes 0 0 %    Segs Absolute 1.78 1.50 - 8.10 k/uL    Absolute Lymph # 1.71 1.10 - 3.70 k/uL    Absolute Mono # 0.31 0.10 - 1.20 k/uL    Absolute Eos # 0.13 0.00 - 0.44 k/uL    Basophils Absolute 0.05 0.00 - 0.20 k/uL    Absolute Immature Granulocyte <0.03 0.00 - 0.30 k/uL   Basic Metabolic Panel w/ Reflex to MG   Result Value Ref Range    Glucose 106 (H) 70 - 99 mg/dL    BUN 19 8 - 23 mg/dL    CREATININE 1.16 (H) 0.50 - 0.90 mg/dL    Calcium 9.5 8.6 - 10.4 mg/dL    Sodium 140 135 - 144 mmol/L    Potassium 3.4 (L) 3.7 - 5.3 mmol/L    Chloride 105 98 - 107 mmol/L    CO2 19 (L) 20 - 31 mmol/L    Anion Gap 16 9 - 17 mmol/L    GFR Non-African American 46 (L) >60 mL/min    GFR  55 (L) >60 mL/min    GFR Comment         Troponin   Result Value Ref Range    Troponin, High Sensitivity 18 (H) 0 - 14 ng/L   Brain Natriuretic Peptide   Result Value Ref Range    Pro- (H) <300 pg/mL   TSH with Reflex   Result Value Ref Range    TSH 1.44 0.30 - 5.00 uIU/mL   Magnesium   Result Value Ref Range    Magnesium 1.9 1.6 - 2.6 mg/dL         RADIOLOGY:  XR CHEST (2 VW)    Result Date: 6/9/2022  EXAMINATION: TWO XRAY VIEWS OF THE CHEST 6/9/2022 6:01 pm COMPARISON: May 11, 2022. HISTORY: ORDERING SYSTEM PROVIDED HISTORY: svt TECHNOLOGIST PROVIDED HISTORY: svt FINDINGS: Upright frontal and lateral view chest radiographs were obtained. The heart is enlarged. The mediastinal contour and pleural spaces are otherwise within normal limits. The lungs are grossly clear. There is no focal consolidation or pneumothorax. The pulmonary vascular pattern is within normal limits. No acute thoracic osseous abnormality. Clear lungs. Cardiomegaly.   No acute cardiopulmonary abnormality. EKG  EKG Interpretation    Interpreted by me    Rhythm: normal sinus   Rate: normal  Axis: normal  Ectopy: none  Conduction: normal  ST Segments: no acute change  T Waves: no acute change  Q Waves: none    Clinical Impression: no acute changes and normal EKG    All EKG's are interpreted by the Emergency Department Physician who either signs or Co-signs this chart in the absence of a cardiologist.    EMERGENCY DEPARTMENT COURSE:  Patient breathing quietly and unlabored on room air. Speech is normal and speaking in full sentences without requiring to pause to take a breath. Febrile physical exam completely unremarkable no adventitious heart or lung sounds vital signs stable no lower extremity edema. Patient states that she feels completely normal.  States that she does not want to be admitted. We will do some cardiac screening labs chest x-ray TSH to rule out underlying process. Troponin 18 however this is less than her baseline creatinine slightly elevated at 1.16 however this is also less than her baseline patient requesting discharge. While on cardiac monitor here in the ED there has been no arrhythmia, heart rate normal EKG unremarkable. Will have patient follow-up with cardiology given return precautions understands and agrees      PROCEDURES:  None    CONSULTS:  None    CRITICAL CARE:  None    FINAL IMPRESSION      1.  Paroxysmal supraventricular tachycardia ([address]          DISPOSITION / PLAN     DISPOSITION Decision To Discharge 06/09/2022 07:37:25 PM      PATIENT REFERRED TO:  Jerrell Rodrigues MD  [address] [facility]  [phone]    Schedule an appointment as soon as possible for a visit today        DISCHARGE MEDICATIONS:  Discharge Medication List as of 6/9/2022  7:38 PM          Margy Richardson MD  Emergency Medicine Resident    (Please note that portions of thisnote were completed with a voice recognition program.  Efforts were made to edit the dictations but occasionally words are mis-transcribed.)       Kath Canas MD  Resident  06/10/22 0000

## 2022-06-09 NOTE — ED NOTES
The following labs labeled with pt sticker and tubed to lab:     [x] Blue     [x] Lavender   [] on ice  [x] Green/yellow  [] Green/black [] on ice  [] Yellow  [] Red  [] Pink      [] COVID-19 swab    [] Rapid  [] PCR  [] Flu swab  [] Peds Viral Panel     [] Urine Sample  [] Pelvic Cultures  [] Blood Cultures          Cali Perez RN  06/09/22 0482

## 2022-06-09 NOTE — ED NOTES
Pt presented to ED with complaints of SVT. Pt was at work and started feeling her heart racing. Pt has hx of SVT. Pt was given 6mg Adenocard IV by EMS which converted her to NSR. Pt denies chest pain. Pt denies shortness of breath. Pt states taking her medications daily. Pt saw her cardiologist last month.       Carolina Walter, RN  06/09/22 4776

## 2022-06-10 LAB
EKG ATRIAL RATE: 90 BPM
EKG P AXIS: 49 DEGREES
EKG P-R INTERVAL: 172 MS
EKG Q-T INTERVAL: 418 MS
EKG QRS DURATION: 108 MS
EKG QTC CALCULATION (BAZETT): 511 MS
EKG R AXIS: -16 DEGREES
EKG T AXIS: 11 DEGREES
EKG VENTRICULAR RATE: 90 BPM

## 2022-06-10 PROCEDURE — 93010 ELECTROCARDIOGRAM REPORT: CPT | Performed by: INTERNAL MEDICINE

## 2022-06-16 ENCOUNTER — ANESTHESIA EVENT (OUTPATIENT)
Dept: OPERATING ROOM | Age: 77
End: 2022-06-16
Payer: COMMERCIAL

## 2022-06-17 ENCOUNTER — APPOINTMENT (OUTPATIENT)
Dept: GENERAL RADIOLOGY | Age: 77
End: 2022-06-17
Attending: PODIATRIST
Payer: COMMERCIAL

## 2022-06-17 ENCOUNTER — HOSPITAL ENCOUNTER (OUTPATIENT)
Age: 77
Setting detail: OUTPATIENT SURGERY
Discharge: HOME | End: 2022-06-17
Attending: PODIATRIST | Admitting: PODIATRIST
Payer: COMMERCIAL

## 2022-06-17 ENCOUNTER — ANESTHESIA (OUTPATIENT)
Dept: OPERATING ROOM | Age: 77
End: 2022-06-17
Payer: COMMERCIAL

## 2022-06-17 VITALS
OXYGEN SATURATION: 94 % | HEIGHT: 71 IN | WEIGHT: 188 LBS | HEART RATE: 46 BPM | BODY MASS INDEX: 26.32 KG/M2 | TEMPERATURE: 97.5 F | RESPIRATION RATE: 11 BRPM | SYSTOLIC BLOOD PRESSURE: 165 MMHG | DIASTOLIC BLOOD PRESSURE: 86 MMHG

## 2022-06-17 DIAGNOSIS — M21.172 VARUS DEFORMITY, NOT ELSEWHERE CLASSIFIED, LEFT ANKLE: ICD-10-CM

## 2022-06-17 DIAGNOSIS — G89.18 POST-OPERATIVE PAIN: Primary | ICD-10-CM

## 2022-06-17 PROCEDURE — 14040 TIS TRNFR F/C/C/M/N/A/G/H/F: CPT | Performed by: PODIATRIST

## 2022-06-17 PROCEDURE — 2580000003 HC RX 258: Performed by: ANESTHESIOLOGY

## 2022-06-17 PROCEDURE — 3600000012 HC SURGERY LEVEL 2 ADDTL 15MIN: Performed by: PODIATRIST

## 2022-06-17 PROCEDURE — 3700000001 HC ADD 15 MINUTES (ANESTHESIA): Performed by: PODIATRIST

## 2022-06-17 PROCEDURE — 6360000002 HC RX W HCPCS: Performed by: ANESTHESIOLOGY

## 2022-06-17 PROCEDURE — 2500000003 HC RX 250 WO HCPCS: Performed by: NURSE ANESTHETIST, CERTIFIED REGISTERED

## 2022-06-17 PROCEDURE — 6360000002 HC RX W HCPCS: Performed by: STUDENT IN AN ORGANIZED HEALTH CARE EDUCATION/TRAINING PROGRAM

## 2022-06-17 PROCEDURE — 3700000000 HC ANESTHESIA ATTENDED CARE: Performed by: PODIATRIST

## 2022-06-17 PROCEDURE — 3600000002 HC SURGERY LEVEL 2 BASE: Performed by: PODIATRIST

## 2022-06-17 PROCEDURE — 88311 DECALCIFY TISSUE: CPT

## 2022-06-17 PROCEDURE — 7100000010 HC PHASE II RECOVERY - FIRST 15 MIN: Performed by: PODIATRIST

## 2022-06-17 PROCEDURE — 2709999900 HC NON-CHARGEABLE SUPPLY: Performed by: PODIATRIST

## 2022-06-17 PROCEDURE — 2720000010 HC SURG SUPPLY STERILE: Performed by: PODIATRIST

## 2022-06-17 PROCEDURE — 28285 REPAIR OF HAMMERTOE: CPT | Performed by: PODIATRIST

## 2022-06-17 PROCEDURE — 6360000002 HC RX W HCPCS: Performed by: NURSE ANESTHETIST, CERTIFIED REGISTERED

## 2022-06-17 PROCEDURE — 73630 X-RAY EXAM OF FOOT: CPT

## 2022-06-17 PROCEDURE — 2500000003 HC RX 250 WO HCPCS: Performed by: PODIATRIST

## 2022-06-17 PROCEDURE — 7100000011 HC PHASE II RECOVERY - ADDTL 15 MIN: Performed by: PODIATRIST

## 2022-06-17 PROCEDURE — 88304 TISSUE EXAM BY PATHOLOGIST: CPT

## 2022-06-17 RX ORDER — ONDANSETRON 2 MG/ML
4 INJECTION INTRAMUSCULAR; INTRAVENOUS
Status: DISCONTINUED | OUTPATIENT
Start: 2022-06-17 | End: 2022-06-17 | Stop reason: HOSPADM

## 2022-06-17 RX ORDER — PROPOFOL 10 MG/ML
INJECTION, EMULSION INTRAVENOUS PRN
Status: DISCONTINUED | OUTPATIENT
Start: 2022-06-17 | End: 2022-06-17 | Stop reason: SDUPTHER

## 2022-06-17 RX ORDER — LIDOCAINE HYDROCHLORIDE 10 MG/ML
1 INJECTION, SOLUTION EPIDURAL; INFILTRATION; INTRACAUDAL; PERINEURAL
Status: DISCONTINUED | OUTPATIENT
Start: 2022-06-17 | End: 2022-06-17 | Stop reason: HOSPADM

## 2022-06-17 RX ORDER — SODIUM CHLORIDE, SODIUM LACTATE, POTASSIUM CHLORIDE, CALCIUM CHLORIDE 600; 310; 30; 20 MG/100ML; MG/100ML; MG/100ML; MG/100ML
INJECTION, SOLUTION INTRAVENOUS CONTINUOUS
Status: DISCONTINUED | OUTPATIENT
Start: 2022-06-17 | End: 2022-06-17 | Stop reason: HOSPADM

## 2022-06-17 RX ORDER — FENTANYL CITRATE 50 UG/ML
25 INJECTION, SOLUTION INTRAMUSCULAR; INTRAVENOUS EVERY 5 MIN PRN
Status: DISCONTINUED | OUTPATIENT
Start: 2022-06-17 | End: 2022-06-17 | Stop reason: HOSPADM

## 2022-06-17 RX ORDER — LIDOCAINE HYDROCHLORIDE 10 MG/ML
INJECTION, SOLUTION EPIDURAL; INFILTRATION; INTRACAUDAL; PERINEURAL PRN
Status: DISCONTINUED | OUTPATIENT
Start: 2022-06-17 | End: 2022-06-17 | Stop reason: ALTCHOICE

## 2022-06-17 RX ORDER — SODIUM CHLORIDE 9 MG/ML
INJECTION, SOLUTION INTRAVENOUS PRN
Status: DISCONTINUED | OUTPATIENT
Start: 2022-06-17 | End: 2022-06-17 | Stop reason: HOSPADM

## 2022-06-17 RX ORDER — FENTANYL CITRATE 50 UG/ML
INJECTION, SOLUTION INTRAMUSCULAR; INTRAVENOUS PRN
Status: DISCONTINUED | OUTPATIENT
Start: 2022-06-17 | End: 2022-06-17 | Stop reason: SDUPTHER

## 2022-06-17 RX ORDER — SODIUM CHLORIDE 0.9 % (FLUSH) 0.9 %
5-40 SYRINGE (ML) INJECTION PRN
Status: DISCONTINUED | OUTPATIENT
Start: 2022-06-17 | End: 2022-06-17 | Stop reason: HOSPADM

## 2022-06-17 RX ORDER — SODIUM CHLORIDE 0.9 % (FLUSH) 0.9 %
5-40 SYRINGE (ML) INJECTION EVERY 12 HOURS SCHEDULED
Status: DISCONTINUED | OUTPATIENT
Start: 2022-06-17 | End: 2022-06-17 | Stop reason: HOSPADM

## 2022-06-17 RX ORDER — HYDROCODONE BITARTRATE AND ACETAMINOPHEN 5; 325 MG/1; MG/1
1 TABLET ORAL
Qty: 28 TABLET | Refills: 0 | Status: SHIPPED | OUTPATIENT
Start: 2022-06-17 | End: 2022-06-24

## 2022-06-17 RX ORDER — OXYCODONE HYDROCHLORIDE 5 MG/1
5 TABLET ORAL
Status: DISCONTINUED | OUTPATIENT
Start: 2022-06-17 | End: 2022-06-17 | Stop reason: HOSPADM

## 2022-06-17 RX ORDER — ONDANSETRON 2 MG/ML
INJECTION INTRAMUSCULAR; INTRAVENOUS PRN
Status: DISCONTINUED | OUTPATIENT
Start: 2022-06-17 | End: 2022-06-17 | Stop reason: SDUPTHER

## 2022-06-17 RX ORDER — FENTANYL CITRATE 50 UG/ML
50 INJECTION, SOLUTION INTRAMUSCULAR; INTRAVENOUS EVERY 5 MIN PRN
Status: COMPLETED | OUTPATIENT
Start: 2022-06-17 | End: 2022-06-17

## 2022-06-17 RX ORDER — LIDOCAINE HYDROCHLORIDE 20 MG/ML
INJECTION, SOLUTION EPIDURAL; INFILTRATION; INTRACAUDAL; PERINEURAL PRN
Status: DISCONTINUED | OUTPATIENT
Start: 2022-06-17 | End: 2022-06-17 | Stop reason: SDUPTHER

## 2022-06-17 RX ORDER — DEXAMETHASONE SODIUM PHOSPHATE 10 MG/ML
INJECTION, SOLUTION INTRAMUSCULAR; INTRAVENOUS PRN
Status: DISCONTINUED | OUTPATIENT
Start: 2022-06-17 | End: 2022-06-17 | Stop reason: SDUPTHER

## 2022-06-17 RX ORDER — SODIUM CHLORIDE 9 MG/ML
INJECTION, SOLUTION INTRAVENOUS CONTINUOUS
Status: DISCONTINUED | OUTPATIENT
Start: 2022-06-17 | End: 2022-06-17 | Stop reason: HOSPADM

## 2022-06-17 RX ADMIN — DEXAMETHASONE SODIUM PHOSPHATE 4 MG: 10 INJECTION, SOLUTION INTRAMUSCULAR; INTRAVENOUS at 11:05

## 2022-06-17 RX ADMIN — FENTANYL CITRATE 50 MCG: 50 INJECTION, SOLUTION INTRAMUSCULAR; INTRAVENOUS at 12:38

## 2022-06-17 RX ADMIN — FENTANYL CITRATE 50 MCG: 50 INJECTION, SOLUTION INTRAMUSCULAR; INTRAVENOUS at 12:03

## 2022-06-17 RX ADMIN — LIDOCAINE HYDROCHLORIDE 100 MG: 20 INJECTION, SOLUTION EPIDURAL; INFILTRATION; INTRACAUDAL; PERINEURAL at 10:59

## 2022-06-17 RX ADMIN — FENTANYL CITRATE 50 MCG: 50 INJECTION, SOLUTION INTRAMUSCULAR; INTRAVENOUS at 12:11

## 2022-06-17 RX ADMIN — FENTANYL CITRATE 50 MCG: 50 INJECTION, SOLUTION INTRAMUSCULAR; INTRAVENOUS at 12:32

## 2022-06-17 RX ADMIN — PROPOFOL 120 MG: 10 INJECTION, EMULSION INTRAVENOUS at 10:59

## 2022-06-17 RX ADMIN — CEFAZOLIN 2000 MG: 10 INJECTION, POWDER, FOR SOLUTION INTRAVENOUS at 11:05

## 2022-06-17 RX ADMIN — SODIUM CHLORIDE, POTASSIUM CHLORIDE, SODIUM LACTATE AND CALCIUM CHLORIDE: 600; 310; 30; 20 INJECTION, SOLUTION INTRAVENOUS at 08:40

## 2022-06-17 RX ADMIN — ONDANSETRON 4 MG: 2 INJECTION INTRAMUSCULAR; INTRAVENOUS at 11:31

## 2022-06-17 RX ADMIN — Medication 25 MCG: at 10:59

## 2022-06-17 ASSESSMENT — PAIN DESCRIPTION - FREQUENCY
FREQUENCY: CONTINUOUS

## 2022-06-17 ASSESSMENT — PAIN DESCRIPTION - PAIN TYPE
TYPE: SURGICAL PAIN

## 2022-06-17 ASSESSMENT — PAIN SCALES - GENERAL
PAINLEVEL_OUTOF10: 10
PAINLEVEL_OUTOF10: 0
PAINLEVEL_OUTOF10: 10

## 2022-06-17 ASSESSMENT — PAIN DESCRIPTION - ONSET
ONSET: ON-GOING

## 2022-06-17 ASSESSMENT — PAIN DESCRIPTION - DESCRIPTORS
DESCRIPTORS: THROBBING

## 2022-06-17 ASSESSMENT — PAIN DESCRIPTION - ORIENTATION
ORIENTATION: LEFT

## 2022-06-17 ASSESSMENT — PAIN DESCRIPTION - LOCATION
LOCATION: TOE (COMMENT WHICH ONE)

## 2022-06-17 ASSESSMENT — ENCOUNTER SYMPTOMS: SHORTNESS OF BREATH: 0

## 2022-06-17 ASSESSMENT — PAIN - FUNCTIONAL ASSESSMENT: PAIN_FUNCTIONAL_ASSESSMENT: 0-10

## 2022-06-17 NOTE — OP NOTE
OP NOTE    PATIENT NAME: Darrell Aguilar  YOB: 1945  -  77 y.o. 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 PROCEDURE:   Derotational arthroplasty, left 5th toe  Rotational flap closure 2.2 cm  left 5th toe    ANESTHESIA: General with local block of 5cc 1% lidocaine plain into left foot    HEMOSTASIS: Pneumatic ankle tourniquet @ 250 mmHg for 29 minutes. ESTIMATED BLOOD LOSS: Less than 1cc. MATERIALS:   * No implants in log *    INJECTABLES: None    SPECIMEN:   ID Type Source Tests Collected by Time Destination   A : LEFT FIFTH TOE Bone Toe SURGICAL PATHOLOGY Ana Ford DPM 6/17/2022 0492        COMPLICATIONS: None    FINDINGS: Upon completion 5th toe is in a more rectus position. Further details to follow in op report. INDICATIONS FOR PROCEDURE: Darrell Aguilar is a 77 y.o. female well known to Dr. Robin Verma with a CC of left 5th toe pain and has failed conservative treatment. Dr. Robin Verma has recommended surgical treatment to which the patient is amenable. In pre-op all risks and rewards of the aforementioned procedure were discussed at length prior to the patient signing the consent form which was witnessed by a nurse and placed in the patient's chart. The left foot was marked, abx hung, labs and images reviewed, NPO status confirmed. No guarantees were given or implied. PROCEDURE IN DETAIL: The patient was brought to the operating room and placed on the operating table in the supine position with a safety strap across the lap. A well-padded pneumatic tourniquet was applied to the ankle. After adequate sedation was given by the anesthesia team, a local block of 10 cc of 1:1 mixture of 1% lidocaine plain was injected into the foot preoperatively.  The surgical site was then scrubbed, prepped, and draped in the usual aseptic manner. A timeout was performed confirming the patient's identity, correct site, correct procedure, allergies, and preoperative antibiotics. An Esmarch bandage was then used to exsanguinate the foot and the tourniquet was inflated to 250mmHg. Attention was then directed to the left fifth toe where a curvilinear, elliptical incision was made over the benign lesion. The benign lesion was excised and then the incision was carried down to tendon. The tendon was then transected and soft tissue was dissected away from the proximal and middle phalanx. The base of the middle phalanx and head of the proximal phalanx were resected using oscillating saw and passed to the back table to be sent to the lab. The flexor tendon was still intact. Sterile saline was used to irrigate the surgical site. Next     Due to the amount of necrotic skin seen a rotational flap was created and cureved to allow for adquate bony coverage over the left 5th toe. The defici was 2.2 cm diameter. The rotational flap was created and rotated in place to cover the bony defitict. 4-0 Monocryl was then used to reapproximate the tendon and close the subcutaneous tissue. 4 Prolene was then used to close skin. The toe was noted to be in a more rectus position upon completion. The incision was dressed with adaptic and covered with sterile compressive dressing such as 4x4s, kerlix, and ACE. The tourniquet was then deflated and immediate hyperemia returned to all digits. The patient tolerated the procedure well and was transferred to the PACU with all vital signs stable and vascular status intact to the foot and ankle. Following a period of postoperative monitoring, the patient was discharged to home and given instructions and prescriptions which were discussed prior to the surgery. Patient will be weightbearing to heel touch. Instructed to keep dressing C/D/I.  Patient to follow up with Dr. Rhianna Clifton.      Electronically signed by Mercedez Plaza DPM on 6/18/2022 at 11:36 AM

## 2022-06-17 NOTE — ANESTHESIA POSTPROCEDURE EVALUATION
Department of Anesthesiology  Postprocedure Note    Patient: Devaughn Lal  MRN: [id number]  YOB: 1945  Date of evaluation: 6/17/2022  Time:  2:03 PM     Procedure Summary     Date: 06/17/22 Room / Location: [address] [place] [address] / [place] [address]    Anesthesia Start: 7137 Anesthesia Stop: 9812    Procedure: LEFT FOOT 5TH  ARTHROPLASTY, LEFT FOOT ROTATIONAL FLAP CLOSURE 2.2CM (Left Foot) Diagnosis:       Varus deformity, not elsewhere classified, left ankle      (DX DISTAL PLANUS VARUS LEFT)    Surgeons: Angie Alvarado DPM Responsible Provider: Cortez Lundborg, MD    Anesthesia Type: general ASA Status: 3          Anesthesia Type: No value filed. Seth Phase I: Seth Score: 10    Seth Phase II:      Last vitals: Reviewed and per EMR flowsheets.        Anesthesia Post Evaluation    Complications: no
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
## 2022-06-17 NOTE — BRIEF OP NOTE
BRIEF OP NOTE    PATIENT NAME: Della Ruth  YOB: 1945  -  77 y.o. female  MRN: [id number]  DATE: 6/17/2022  BILLING #: [id number]    Surgeon(s):  Demetrio Marrero DPM     ASSISTANTS: Iraj Bullock DPM     PRE-OP DIAGNOSIS:   1. 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 Benign neoplasm, left 5th toe    POST-OP DIAGNOSIS: Same as above. PROCEDURE:   1. Derotational arthroplasty, left 5th toe  2. Excision of benign neoplasm, left 5th toe    ANESTHESIA: General with local block of 5cc 1% lidocaine plain into left foot    HEMOSTASIS: Pneumatic ankle tourniquet @ 250 mmHg for 29 minutes. ESTIMATED BLOOD LOSS: Less than 1cc. MATERIALS:   * No implants in log *    INJECTABLES: None    SPECIMEN:   ID Type Source Tests Collected by Time Destination   A : LEFT FIFTH TOE Bone Toe SURGICAL PATHOLOGY Demetrio Marrero DPM 6/17/2022 8037        COMPLICATIONS: None    FINDINGS: Upon completion 5th toe is in a more rectus position. Further details to follow in op report. The patient was counseled at length about the risks of cami Covid-19 during their perioperative period and any recovery window from their procedure. The patient was made aware that cami Covid-19  may worsen their prognosis for recovering from their procedure  and lend to a higher morbidity and/or mortality risk. All material risks, benefits, and reasonable alternatives including postponing the procedure were discussed. The patient does wish to proceed with the procedure at this time.     Iraj Bullock DPM   Podiatric Medicine & Surgery   6/17/2022 at 11:55 AM
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
## 2022-06-17 NOTE — H&P
H&P Update    Patient's Office Note from 04/26/2022 was reviewed. There are no changes today. Past Medical History:   Diagnosis Date    Arthritis     At[facility] fibrillation ([facility]     Cancer (HCC)     left breast cancer    Hyperlipidemia     Hypertension     SVT (supraventricular tachycardia) ([facility] [address] 2009        Social History     Socioeconomic History    Marital status: Single     Spouse name: Not on file    Number of children: Not on file    Years of education: Not on file    Highest education level: Not on file   Occupational History    Not on file   Tobacco Use    Smoking status: Never Smoker    Smokeless tobacco: Never Used   Vaping Use    Vaping Use: Never used   Substance and Sexual Activity    Alcohol use: Yes     Comment: rarely    Drug use: Never    Sexual activity: Not on file   Other Topics Concern    Not on file   Social History Narrative    Not on file     Social Determinants of Health     Financial Resource Strain:     Difficulty of Paying Living Expenses: Not on file   Food Insecurity:     Worried About Running Out of Food in the Last Year: Not on file    Harish of Food in the Last Year: Not on file   Transportation Needs:     Lack of Transportation (Medical): Not on file    Lack of Transportation (Non-Medical):  Not on file   Physical Activity:     Days of Exercise per Week: Not on file    Minutes of Exercise per Session: Not on file   Stress:     Feeling of Stress : Not on file   Social Connections:     Frequency of Communication with Friends and Family: Not on file    Frequency of Social Gatherings with Friends and Family: Not on file    Attends Shinto Services: Not on file    Active Member of Clubs or Organizations: Not on file    Attends Club or Organization Meetings: Not on file    Marital Status: Not on file   Intimate Partner Violence:     Fear of Current or Ex-Partner: Not on file    Emotionally Abused: Not on file    Physically Abused: Not on file   Blase Farazing Sexually Abused: Not on file   Housing Stability:     Unable to Pay for Housing in the Last Year: Not on file    Number of Places Lived in the Last Year: Not on file    Unstable Housing in the Last Year: Not on file        History reviewed. No pertinent family history. Plan to proceed with left 5th arthroplasty with rotational flap as scheduled.       Electronically signed by Bere oCoper DPM on 6/17/22 at 9:27 AM EDT

## 2022-06-17 NOTE — FLOWSHEET NOTE
Dr Laureen Nation notified. To bedside. Pt sb in low 40's. Sat 98%. bp 480'Q systolic. Alert and oriented. Informed dr. Mee Rizvi that pt took in toprol xl this am.    1255  Remains sb 44. Dozing on and off.  Dressing dry and intact

## 2022-06-18 ENCOUNTER — HOSPITAL ENCOUNTER (EMERGENCY)
Age: 77
Discharge: HOME | End: 2022-06-18
Attending: EMERGENCY MEDICINE

## 2022-06-18 VITALS
RESPIRATION RATE: 16 BRPM | HEART RATE: 51 BPM | DIASTOLIC BLOOD PRESSURE: 94 MMHG | OXYGEN SATURATION: 96 % | HEIGHT: 71 IN | BODY MASS INDEX: 25.48 KG/M2 | SYSTOLIC BLOOD PRESSURE: 169 MMHG | TEMPERATURE: 98 F | WEIGHT: 182 LBS

## 2022-06-18 DIAGNOSIS — I10 PRIMARY HYPERTENSION: Primary | ICD-10-CM

## 2022-06-18 PROCEDURE — 99282 EMERGENCY DEPT VISIT SF MDM: CPT

## 2022-06-18 ASSESSMENT — ENCOUNTER SYMPTOMS
FACIAL SWELLING: 0
VOMITING: 0
EYE DISCHARGE: 0
ABDOMINAL PAIN: 0
EYE REDNESS: 0
SHORTNESS OF BREATH: 0
COLOR CHANGE: 0
DIARRHEA: 0
COUGH: 0
CONSTIPATION: 0

## 2022-06-18 ASSESSMENT — PAIN DESCRIPTION - FREQUENCY: FREQUENCY: CONTINUOUS

## 2022-06-18 ASSESSMENT — PAIN DESCRIPTION - LOCATION: LOCATION: FOOT

## 2022-06-18 ASSESSMENT — PAIN DESCRIPTION - DESCRIPTORS: DESCRIPTORS: DULL

## 2022-06-18 ASSESSMENT — PAIN SCALES - GENERAL: PAINLEVEL_OUTOF10: 3

## 2022-06-18 ASSESSMENT — PAIN - FUNCTIONAL ASSESSMENT: PAIN_FUNCTIONAL_ASSESSMENT: 0-10

## 2022-06-18 ASSESSMENT — PAIN DESCRIPTION - ORIENTATION: ORIENTATION: LEFT
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
## 2022-06-20 LAB — SURGICAL PATHOLOGY REPORT: NORMAL

## 2022-06-28 ENCOUNTER — OFFICE VISIT (OUTPATIENT)
Dept: PODIATRY | Age: 77
End: 2022-06-28

## 2022-06-28 VITALS — BODY MASS INDEX: 25.48 KG/M2 | HEIGHT: 71 IN | WEIGHT: 182 LBS

## 2022-06-28 DIAGNOSIS — Z98.890 POST-OPERATIVE STATE: Primary | ICD-10-CM

## 2022-06-28 PROCEDURE — 99024 POSTOP FOLLOW-UP VISIT: CPT | Performed by: PODIATRIST

## 2022-06-28 NOTE — PROGRESS NOTES
[address] [facility] PODIATRY [facility]  [address] [address] [address]  Dept: [phone]  Dept Fax: [phone]    POST-OP PROGRESS NOTE  Date of patient's visit: 6/28/2022  Patient's Name:  Calin Fischer YOB: 1945            Patient Care Team:  Giacomo Morales MD as PCP - General (Family Medicine)  Gwendolyn Benedict DPM as Physician (Podiatry)        Chief Complaint   Patient presents with    Post-Op Check     1 wk post op    Foot Pain     left foot         Subjective: Calin Fischer is a 77 y.o. female who presents to the office today 1week(s)  S/P left foot derotational arthroplasty with rotational flap closure  for correction of deformed 5th toe left   Problem List Items Addressed This Visit     None      . Patient relates pain is Present and improved. Pain is rated 1 out of 10 and is described as intermittent, mild. Currently denies F/C/N/V. Is patient taking pain medications as prescribed and is controlling pain    Physical Examination:  Incision is coapted, sutures/steri-strips are intact. Minimal bleeding post operatively. Edema present. No erythema. No Pus. Operative correction is satisfactory. Radiographs: next visit      Assessment: Calin Fischer is status post as above  Normal post operative course. Doing well         Diagnosis Orders   1. Post-operative state           Plan:  Patient examined and evaluated. Current condition and treatment options discussed in detail. Advised pt to her conditon. New DSD applied to day and pt to keep the new dressings clean dry and intact. Pt to remain in a surgical shoe. Sutures out next week . Verbal and written instructions given to patient. Orders: No orders of the defined types were placed in this encounter. Contact office with any questions/problems/concerns. RTC in 10day(s).      Electronically signed by Gwendolyn Benedict DPM on 6/28/2022 at 8:32 AM  6/28/2022

## 2022-07-11 ENCOUNTER — OFFICE VISIT (OUTPATIENT)
Dept: PODIATRY | Age: 77
End: 2022-07-11

## 2022-07-11 VITALS — WEIGHT: 182 LBS | HEIGHT: 71 IN | BODY MASS INDEX: 25.48 KG/M2

## 2022-07-11 DIAGNOSIS — Z98.890 POST-OPERATIVE STATE: Primary | ICD-10-CM

## 2022-07-11 PROCEDURE — 99024 POSTOP FOLLOW-UP VISIT: CPT | Performed by: PODIATRIST

## 2022-07-11 NOTE — PROGRESS NOTES
[address] [facility] PODIATRY [facility]  [address] [address] [address]  Dept: [phone]  Dept Fax: [phone]    POST-OP PROGRESS NOTE  Date of patient's visit: 7/11/2022  Patient's Name:  Sebastián Dior YOB: 1945            Patient Care Team:  Michael Zamarripa MD as PCP - General (Family Medicine)  Dedrick Escamilla DPM as Physician (Podiatry)        Chief Complaint   Patient presents with    Post-Op Check     stitch removal 5th toe left foot         Subjective: Sebastián Dior is a 77 y.o. female who presents to the office today 3 week(s)  S/P left foot derotational arthroplasty with rotational flap closure  for correction of deformed 5th toe left   Problem List Items Addressed This Visit     None       Patient relates pain is Absent and improved. Pain is rated 0 out of 10 and is described as none. Currently denies F/C/N/V. Is patient taking pain medications as prescribed and is controlling pain    Physical Examination:  Incision is coapted, sutures/steri-strips are intact. Minimal bleeding post operatively. Edema present. No erythema. No Pus. Operative correction is satisfactory. Radiographs: next visit      Assessment: Sebastián Dior is status post as above  Normal post operative course. Doing well         Diagnosis Orders   1. Post-operative state           Plan:  Patient examined and evaluated. Current condition and treatment options discussed in detail. Advised pt to her conditon. Patient presents for suture removal. The wound is well healed without signs of infection. The sutures are removed. Wound care and activity instructions given. New DSD applied. Verbal and written instructions given to patient. Orders: No orders of the defined types were placed in this encounter. Contact office with any questions/problems/concerns.   RTC in 3 weeks with x rays     Electronically signed by Dedrick Escamilla DPM on 7/11/2022 at 10:27 AM  7/11/2022

## 2022-08-09 ENCOUNTER — OFFICE VISIT (OUTPATIENT)
Dept: PODIATRY | Age: 77
End: 2022-08-09

## 2022-08-09 VITALS — HEIGHT: 71 IN | BODY MASS INDEX: 25.48 KG/M2 | WEIGHT: 182 LBS

## 2022-08-09 DIAGNOSIS — M79.672 LEFT FOOT PAIN: Primary | ICD-10-CM

## 2022-08-09 PROCEDURE — 99024 POSTOP FOLLOW-UP VISIT: CPT | Performed by: PODIATRIST

## 2022-08-09 NOTE — PATIENT INSTRUCTIONS
Schedule a Vaccine  When you qualify to receive the vaccine, call the [facility]) COVID-19 Vaccination Hotline to schedule your appointment or to get additional information about the [facility]) locations which are offering the COVID-19 vaccine. To be 94% effective, it's important that you receive two doses of one of the COVID-19 vaccines. -If you are receiving the Vogt Peter vaccine, your second shot will be scheduled as close to 21 days after the first shot as possible. -If you are receiving the Moderna vaccine, your second shot will be scheduled as close to 28 days after the first shot as possible. [facility]) COVID-19 Vaccination Hotline: [phone]    Links to [facility]) website and [facility] website:    [email or web address]    [email or web address]

## 2022-08-09 NOTE — PROGRESS NOTES
[address] [facility] PODIATRY [facility]  [address] [address] [address]  Dept: [phone]  Dept Fax: [phone]    POST-OP PROGRESS NOTE  Date of patient's visit: 8/9/2022  Patient's Name:  Angelique Huertas YOB: 1945            Patient Care Team:  Marnie Meigs, MD as PCP - General (Family Medicine)  Marisa Alvarez DPM as Physician (Podiatry)        Chief Complaint   Patient presents with    Post-Op Check     Rtc 4 weeks - LEFT FOOT 5TH  ARTHROPLASTY, LEFT FOOT ROTATIONAL FLAP CLOSURE 2. 2CM    X-ray      Left foot           Subjective: Angelique Huertas is a 69 y.o. female who presents to the office today 6. 5 week(s)  S/P LEFT FOOT 5TH  ARTHROPLASTY, LEFT FOOT ROTATIONAL FLAP CLOSURE 2.2CM for correction of left foot pain and discomfort  Problem List Items Addressed This Visit       Left foot pain - Primary    Relevant Orders    XR FOOT LEFT (MIN 3 VIEWS)   Patient relates pain is Present and stayed the same. Pain is rated 4 out of 10 and is described as constant, mild. Currently denies F/C/N/V. Is patient taking pain medications as prescribed and is controlling pain n/a    Physical Examination:  Incision is coapted, sutures/steri-strips are intact. Minimal bleeding post operatively. Edema present. No erythema. No Pus. Operative correction is satisfactory. Radiographs: left foot 3 views:  resected middle phanalx and head of the proximal phalanx       Assessment: Angelique Huertas is status post as above  Normal post operative course. Doing well          ICD-10-CM    1. Left foot pain  M79.672 XR FOOT LEFT (MIN 3 VIEWS)            Plan:  Patient examined and evaluated. Current condition and treatment options discussed in detail. Advised pt to her condiiton. The edema is much better then rpevious weeks. The lesion is not returning on the latearl aspect of the 5th toe left.   Pt to continue to use lotion to the area daily .  Verbal and written instructions given to patient. Orders:   Orders Placed This Encounter   Procedures    XR FOOT LEFT (MIN 3 VIEWS)     Order Specific Question:   Reason for exam:     Answer:   left foot pain      Contact office with any questions/problems/concerns. RTC in 6week(s) for final x rays.      Electronically signed by Radha Devi DPM on 8/9/2022 at 1:08 PM  8/9/2022

## 2022-09-20 ENCOUNTER — OFFICE VISIT (OUTPATIENT)
Dept: PODIATRY | Age: 77
End: 2022-09-20
Payer: COMMERCIAL

## 2022-09-20 VITALS — WEIGHT: 182 LBS | BODY MASS INDEX: 25.48 KG/M2 | HEIGHT: 71 IN

## 2022-09-20 DIAGNOSIS — M79.672 LEFT FOOT PAIN: Primary | ICD-10-CM

## 2022-09-20 PROCEDURE — 1036F TOBACCO NON-USER: CPT | Performed by: PODIATRIST

## 2022-09-20 PROCEDURE — 99214 OFFICE O/P EST MOD 30 MIN: CPT | Performed by: PODIATRIST

## 2022-09-20 PROCEDURE — G8427 DOCREV CUR MEDS BY ELIG CLIN: HCPCS | Performed by: PODIATRIST

## 2022-09-20 PROCEDURE — 1123F ACP DISCUSS/DSCN MKR DOCD: CPT | Performed by: PODIATRIST

## 2022-09-20 PROCEDURE — G8417 CALC BMI ABV UP PARAM F/U: HCPCS | Performed by: PODIATRIST

## 2022-09-20 PROCEDURE — 1090F PRES/ABSN URINE INCON ASSESS: CPT | Performed by: PODIATRIST

## 2022-09-20 PROCEDURE — G8400 PT W/DXA NO RESULTS DOC: HCPCS | Performed by: PODIATRIST

## 2022-09-20 NOTE — PROGRESS NOTES
[address] [facility] PODIATRY [facility]  [address] [address] [address]  Dept: [phone]  Dept Fax: [phone]    RETURN PATIENT PROGRESS NOTE  Date of patient's visit: 9/20/2022  Patient's Name:  Tasia Moya YOB: 1945            Patient Care Team:  Jaime Hutchinson MD as PCP - General (Family Medicine)  Josué Pascual DPM as Physician (Podiatry)       Tasia Moya 69 y.o. female that presents for follow-up of   Chief Complaint   Patient presents with    Post-Op Check     12 wk post op     Pt's primary care physician is Jaime Hutchinson MD last seen8/30/2022  Symptoms began 3 month(s) ago and are decreased . Patient relates pain is Present. Pain is rated 1 out of 10 and is described as none. Treatments prior to today's visit include: previous surgery. Currently denies F/C/N/V. Allergies   Allergen Reactions    No Known Allergies        Past Medical History:   Diagnosis Date    Arthritis     Atrial fibrillation ([facility]     Cancer ([facility]     left breast cancer    Hyperlipidemia     Hypertension     SVT (supraventricular tachycardia) ([facility] 2009       Prior to Admission medications    Medication Sig Start Date End Date Taking?  Authorizing Provider   Rosuvastatin Calcium 20 MG CPSP Take 20 mg by mouth daily   Yes Historical Provider, MD   calcium carbonate 1500 (600 Ca) MG TABS tablet Take 600 mg by mouth daily   Yes Historical Provider, MD   Prenatal MV-Min-Fe Fum-FA-DHA (PRENATAL 1 PO) Take 1 tablet by mouth daily   Yes Historical Provider, MD   Cholecalciferol (VITAMIN D3) 1.25 MG (58006 UT) CAPS Take 1 capsule by mouth daily   Yes Historical Provider, MD   Handicap Placard MISC by Does not apply route Temporary use- not to exceed more than 5 years  Patient unable to walk more than 200 feet without needing to stop to rest 4/26/22  Yes Josué Pascual DPM   acetaminophen (APAP EXTRA STRENGTH) 500 MG tablet Take 2 tablets by mouth every 6 hours as needed for Pain 8/27/21  Yes Abel Richey,    amLODIPine (NORVASC) 5 MG tablet Take 2 tablets by mouth daily 6/11/21  Yes Gee Crystal MD   apixaban (ELIQUIS) 5 MG TABS tablet Take 1 tablet by mouth 2 times daily  Patient taking differently: Take by mouth 2 times daily 6/11/21  Yes Gee Crystal MD   metoprolol succinate (TOPROL XL) 50 MG extended release tablet Take 50 mg by mouth daily 5/13/20  Yes Historical Provider, MD   anastrozole (ARIMIDEX) 1 MG tablet Take 1 mg by mouth daily   Yes Historical Provider, MD       Review of Systems    Review of Systems:  History obtained from chart review and the patient  General ROS: negative for - chills, fatigue, fever, night sweats or weight gain  Constitutional: Negative for chills, diaphoresis, fatigue, fever and unexpected weight change. Musculoskeletal: Positive for arthralgias, gait problem and joint swelling. Neurological ROS: negative for - behavioral changes, confusion, headaches or seizures. Negative for weakness and numbness. Dermatological ROS: negative for - mole changes, rash  Cardiovascular: Negative for leg swelling. Gastrointestinal: Negative for constipation, diarrhea, nausea and vomiting. Lower Extremity Physical Examination:     Vitals: There were no vitals filed for this visit. General: AAO x 3 in NAD. Dermatologic Exam:  Skin lesion/ulceration Absent . Skin No rashes or nodules noted. .       Musculoskeletal:     1st MPJ ROM decreased, Bilateral.  Muscle strength 5/5, Bilateral. Minimal  Pain present upon palpation of left foot 5th toe laterally. There is a digiti quini varus deformity seen . Medial longitudinal arch, Bilateral WNL.   Ankle ROM WNL,Bilateral.    Dorsally contracted digits absent digits 1-5 Bilateral.     Vascular: DP and PT pulses palpable 2/4, Bilateral.  CFT <3 seconds, Bilateral.  Hair growth present to the level of the digits, Bilateral.  Edema absent, Bilateral.  Varicosities absent, Bilateral. Erythema absent, Bilateral    Neurological: Sensation intact to light touch to level of digits, Bilateral.  Protective sensation intact 10/10 sites via 5.07/10g [place] Monofilament, Bilateral.  negative Tinel's, Bilateral.  negative Valleix sign, Bilateral.      Integument: Warm, dry, supple, Bilateral.  Open lesion absent, Bilateral.  Interdigital maceration absent to web spaces 1-4, Bilateral.  Nails are normal in length, thickness and color 1-5 bilateral.  Fissures absent, Bilateral.       Xrays left foot 3 views:  resected middle phalanx of the left 5th toe with no   Asessment: Patient is a 69 y.o. female with:    Diagnosis Orders   1. Left foot pain                Plan: Patient examined and evaluated. Current condition and treatment options discussed in detail. Advised pt to the left foot x rays and the findings. Pt has bene in sandals but is about to transition into shoes. Pt to continue to use lotion to the left foot 5th toe. There Is no lesion to the left foot. .  Verbal and written instructions given to patient. Contact office with any questions/problems/concerns. Orders Placed This Encounter   Procedures    XR FOOT LEFT (MIN 3 VIEWS)     No orders of the defined types were placed in this encounter. All labs were reviewed and all imagining including the above findings were reviewed PRIOR to the patients arrival and with the patient today. Previous patient encounter was reviewed. Encounters from the patients other medical providers were reviewed and noted. Time was spent educating the patient and their families/caregivers on proper care of the feet and ankles. All the above diagnosis were addressed at todays visit and all questions were answered.   A total of 30 minutes was spent with this patients encounter which included charting after the patients visit       RTC in PRN.    9/20/2022      Electronically signed by Chica Pringle DPM on 9/22/2022 at 7:56

## 2023-06-11 PROBLEM — E04.2 MULTIPLE THYROID NODULES: Status: ACTIVE | Noted: 2023-06-11

## 2023-06-11 PROBLEM — E05.90 SUBCLINICAL HYPERTHYROIDISM: Status: ACTIVE | Noted: 2023-06-11

## 2023-06-11 PROBLEM — M48.061 LUMBAR STENOSIS: Status: ACTIVE | Noted: 2023-06-11

## 2023-06-11 PROBLEM — E55.9 VITAMIN D DEFICIENCY: Status: ACTIVE | Noted: 2023-06-11

## 2023-06-11 PROBLEM — N18.31 STAGE 3A CHRONIC KIDNEY DISEASE (CKD) (HCC): Status: ACTIVE | Noted: 2023-06-11

## 2023-06-11 PROBLEM — R73.03 PREDIABETES: Status: ACTIVE | Noted: 2023-06-11

## 2023-07-25 ENCOUNTER — OFFICE VISIT (OUTPATIENT)
Dept: PODIATRY | Age: 78
End: 2023-07-25
Payer: MEDICARE

## 2023-07-25 VITALS — HEIGHT: 69 IN | BODY MASS INDEX: 26.96 KG/M2 | WEIGHT: 182 LBS

## 2023-07-25 DIAGNOSIS — M79.671 PAIN IN BOTH FEET: ICD-10-CM

## 2023-07-25 DIAGNOSIS — M79.672 PAIN IN BOTH FEET: ICD-10-CM

## 2023-07-25 DIAGNOSIS — R26.2 TROUBLE WALKING: ICD-10-CM

## 2023-07-25 DIAGNOSIS — M79.671 RIGHT FOOT PAIN: Primary | ICD-10-CM

## 2023-07-25 DIAGNOSIS — D23.71 BENIGN NEOPLASM OF SKIN OF LOWER LIMB, INCLUDING HIP, RIGHT: ICD-10-CM

## 2023-07-25 PROCEDURE — 17110 DESTRUCTION B9 LES UP TO 14: CPT | Performed by: PODIATRIST

## 2023-07-25 PROCEDURE — G8400 PT W/DXA NO RESULTS DOC: HCPCS | Performed by: PODIATRIST

## 2023-07-25 PROCEDURE — 1090F PRES/ABSN URINE INCON ASSESS: CPT | Performed by: PODIATRIST

## 2023-07-25 PROCEDURE — 1123F ACP DISCUSS/DSCN MKR DOCD: CPT | Performed by: PODIATRIST

## 2023-07-25 PROCEDURE — 1036F TOBACCO NON-USER: CPT | Performed by: PODIATRIST

## 2023-07-25 PROCEDURE — 99213 OFFICE O/P EST LOW 20 MIN: CPT | Performed by: PODIATRIST

## 2023-07-25 PROCEDURE — G8417 CALC BMI ABV UP PARAM F/U: HCPCS | Performed by: PODIATRIST

## 2023-07-25 PROCEDURE — G8427 DOCREV CUR MEDS BY ELIG CLIN: HCPCS | Performed by: PODIATRIST

## 2023-07-25 NOTE — PROGRESS NOTES
[address] [facility] PODIATRY [address] [place] [address]  Dept: [phone]  Dept Fax: [phone]    BENIGN NEOPLASM PROGRESS NOTE  Date of patient's visit: 7/25/2023  Patient's Name:  Nieves Rebolledo YOB: 1945            Patient Care Team:  Ophelia Bellamy MD as PCP - General (Family Medicine)  Keyonna Garcia DPM as Physician (Podiatry)              Chief Complaint   Patient presents with    Toe Pain      5th toe right foot x 2 months     Benign Neoplasm     Right foot          Nieves Rebolledo is a 69 y.o. female with the chief complaint of painful lesions on her   feet. . They have been present for 2 year(s) duration. The lesions are present on the right foot. The patient has 2 lesion that is deep seated and has a painful core. Treatment has included pads and lotion       This is a new lesion on the right foot   her left fot lesion is gone   Review of Systems    Review of Systems:  History obtained fromchart review and the patient  General ROS: negative for - chills, fatigue, fever, night sweats or weight gain  Constitutional: Negative for chills, diaphoresis, fatigue, fever and unexpected weight change. Musculoskeletal: Positive for arthralgias, gait problem and joint swelling. Neurological ROS: negative for -behavioral changes, confusion, headaches or seizures. Negative for weakness and numbness. Dermatological ROS: negative for - mole changes, rash  Cardiovascular: Negative for leg swelling. Gastrointestinal: Negative for constipation, diarrhea, nausea and vomiting. Lower extremity physical exam:  Dermatologic Exam:  Skin lesion present to the right and left plantar foot with a central core and petchaie noted to the lesions periphery.   Pain on palpation of the lesion        Musculoskeletal:     1st MPJ ROM decreased, Bilateral.  Muscle strength 5/5, Bilateral.  Pain present upon palpation of

## 2023-08-22 RX ORDER — ROSUVASTATIN CALCIUM 20 MG/1
TABLET, COATED ORAL
Qty: 100 TABLET | Refills: 2 | Status: SHIPPED | OUTPATIENT
Start: 2023-08-22

## 2023-09-26 RX ORDER — METOPROLOL SUCCINATE 50 MG/1
50 TABLET, EXTENDED RELEASE ORAL DAILY
Qty: 100 TABLET | Refills: 2 | Status: SHIPPED | OUTPATIENT
Start: 2023-09-26

## 2023-11-10 ENCOUNTER — HOSPITAL ENCOUNTER (OUTPATIENT)
Dept: ULTRASOUND IMAGING | Age: 78
End: 2023-11-10
Attending: STUDENT IN AN ORGANIZED HEALTH CARE EDUCATION/TRAINING PROGRAM
Payer: MEDICARE

## 2023-11-10 DIAGNOSIS — E04.2 MULTIPLE THYROID NODULES: ICD-10-CM

## 2023-11-10 PROCEDURE — 76536 US EXAM OF HEAD AND NECK: CPT

## 2023-11-21 ENCOUNTER — OFFICE VISIT (OUTPATIENT)
Dept: PODIATRY | Age: 78
End: 2023-11-21
Payer: MEDICARE

## 2023-11-21 VITALS — HEIGHT: 72 IN | BODY MASS INDEX: 24.65 KG/M2 | WEIGHT: 182 LBS

## 2023-11-21 DIAGNOSIS — D23.71 BENIGN NEOPLASM OF SKIN OF LOWER LIMB, INCLUDING HIP, RIGHT: ICD-10-CM

## 2023-11-21 DIAGNOSIS — M20.5X1 ACQUIRED DIGITI QUINTI VARUS DEFORMITY OF RIGHT FOOT: ICD-10-CM

## 2023-11-21 DIAGNOSIS — M79.676 PAIN OF FIFTH TOE: ICD-10-CM

## 2023-11-21 DIAGNOSIS — M79.671 PAIN IN BOTH FEET: ICD-10-CM

## 2023-11-21 DIAGNOSIS — M79.672 PAIN IN BOTH FEET: ICD-10-CM

## 2023-11-21 DIAGNOSIS — M79.672 LEFT FOOT PAIN: ICD-10-CM

## 2023-11-21 DIAGNOSIS — R26.2 TROUBLE WALKING: ICD-10-CM

## 2023-11-21 DIAGNOSIS — M79.671 RIGHT FOOT PAIN: Primary | ICD-10-CM

## 2023-11-21 PROCEDURE — G8427 DOCREV CUR MEDS BY ELIG CLIN: HCPCS | Performed by: PODIATRIST

## 2023-11-21 PROCEDURE — 1036F TOBACCO NON-USER: CPT | Performed by: PODIATRIST

## 2023-11-21 PROCEDURE — 17110 DESTRUCTION B9 LES UP TO 14: CPT | Performed by: PODIATRIST

## 2023-11-21 PROCEDURE — 1090F PRES/ABSN URINE INCON ASSESS: CPT | Performed by: PODIATRIST

## 2023-11-21 PROCEDURE — 1123F ACP DISCUSS/DSCN MKR DOCD: CPT | Performed by: PODIATRIST

## 2023-11-21 PROCEDURE — G8417 CALC BMI ABV UP PARAM F/U: HCPCS | Performed by: PODIATRIST

## 2023-11-21 PROCEDURE — G8484 FLU IMMUNIZE NO ADMIN: HCPCS | Performed by: PODIATRIST

## 2023-11-21 PROCEDURE — G8400 PT W/DXA NO RESULTS DOC: HCPCS | Performed by: PODIATRIST

## 2023-11-21 PROCEDURE — 99213 OFFICE O/P EST LOW 20 MIN: CPT | Performed by: PODIATRIST

## 2023-11-21 NOTE — PROGRESS NOTES
[address] [facility],[address] PODIATRY [address] [place] [address]  Dept: [phone]  Dept Fax: [phone]    BENIGN NEOPLASM PROGRESS NOTE  Date of patient's visit: 11/21/2023  Patient's Name:  Danielito Salmeron YOB: 1945            Patient Care Team:  Wendi Nava MD as PCP - General (Family Medicine)  Wendi Nava MD as PCP - Empaneled Provider  Susana Gayle DPM as Physician (Podiatry)              Chief Complaint   Patient presents with    Toe Pain     5th toe right foot follow up     Benign Neoplasm     Right foot          Danielito Salmeron is a 69 y.o. female with the chief complaint of painful lesions on her   feet. . They have been present for 1 year(s) duration. The lesions are present on the right foot. The patient has 2 lesion that is deep seated and has a painful core. Treatment has included toe surgery on the left. She is interested on having the same on the right     Review of Systems    Review of Systems:  History obtained fromchart review and the patient  General ROS: negative for - chills, fatigue, fever, night sweats or weight gain  Constitutional: Negative for chills, diaphoresis, fatigue, fever and unexpected weight change. Musculoskeletal: Positive for arthralgias, gait problem and joint swelling. Neurological ROS: negative for -behavioral changes, confusion, headaches or seizures. Negative for weakness and numbness. Dermatological ROS: negative for - mole changes, rash  Cardiovascular: Negative for leg swelling. Gastrointestinal: Negative for constipation, diarrhea, nausea and vomiting. Lower extremity physical exam:  Dermatologic Exam:  Skin lesion present to the right and left plantar foot with a central core and petchaie noted to the lesions periphery.   Pain on palpation of the lesion        Musculoskeletal:     1st MPJ ROM decreased, Bilateral.  Muscle strength 5/5, Bilateral.

## 2023-11-27 ENCOUNTER — TELEPHONE (OUTPATIENT)
Age: 78
End: 2023-11-27

## 2023-11-27 NOTE — TELEPHONE ENCOUNTER
Patient notified and verb understanding of plan of acre. She will keep her follow up appt as scheduled.

## 2023-11-27 NOTE — TELEPHONE ENCOUNTER
I sent patient a [employer] message and she has not viewed it yet if you can let her know the following:    Jorge Luis Botello,     Hope you are well! Your thyroid US looked good, they saw many nodules on your thyroid US; however they looked at the 4 biggest ones. Based on the sizes of those the radiologist did not recommend any follow up and did not recommend biopsy. We can discuss further at our follow up appointment. Please schedule if you do not have one when it is convenient for you.      Have a happy holiday!     -Dr. Alaina Ruth

## 2024-03-08 RX ORDER — METOPROLOL SUCCINATE 50 MG/1
50 TABLET, EXTENDED RELEASE ORAL DAILY
Qty: 90 TABLET | Refills: 1 | Status: SHIPPED | OUTPATIENT
Start: 2024-03-08

## 2024-03-08 RX ORDER — AMLODIPINE BESYLATE 5 MG/1
10 TABLET ORAL DAILY
Qty: 90 TABLET | Refills: 1 | Status: SHIPPED | OUTPATIENT
Start: 2024-03-08

## 2024-03-08 RX ORDER — ANASTROZOLE 1 MG/1
1 TABLET ORAL DAILY
Qty: 90 TABLET | Refills: 1 | Status: SHIPPED | OUTPATIENT
Start: 2024-03-08

## 2024-03-08 RX ORDER — ACETAMINOPHEN 500 MG
1000 TABLET ORAL EVERY 6 HOURS PRN
Qty: 180 TABLET | Refills: 1 | Status: SHIPPED | OUTPATIENT
Start: 2024-03-08

## 2024-03-08 RX ORDER — NYSTATIN 100000 U/G
CREAM TOPICAL
Qty: 15 G | Refills: 0 | Status: SHIPPED | OUTPATIENT
Start: 2024-03-08

## 2024-03-19 ENCOUNTER — OFFICE VISIT (OUTPATIENT)
Dept: PODIATRY | Age: 79
End: 2024-03-19
Payer: COMMERCIAL

## 2024-03-19 VITALS — WEIGHT: 182 LBS | BODY MASS INDEX: 25.48 KG/M2 | HEIGHT: 71 IN

## 2024-03-19 DIAGNOSIS — D23.71 BENIGN NEOPLASM OF SKIN OF LOWER LIMB, INCLUDING HIP, RIGHT: Primary | ICD-10-CM

## 2024-03-19 DIAGNOSIS — M79.671 RIGHT FOOT PAIN: ICD-10-CM

## 2024-03-19 DIAGNOSIS — M79.676 PAIN OF FIFTH TOE: ICD-10-CM

## 2024-03-19 DIAGNOSIS — M20.5X1 ACQUIRED DIGITI QUINTI VARUS DEFORMITY OF RIGHT FOOT: ICD-10-CM

## 2024-03-19 PROCEDURE — 1123F ACP DISCUSS/DSCN MKR DOCD: CPT | Performed by: PODIATRIST

## 2024-03-19 PROCEDURE — 99213 OFFICE O/P EST LOW 20 MIN: CPT | Performed by: PODIATRIST

## 2024-03-19 PROCEDURE — 17110 DESTRUCTION B9 LES UP TO 14: CPT | Performed by: PODIATRIST

## 2024-03-19 NOTE — PROGRESS NOTES
[facility] PODIATRY [address]  [address]  [place] [id number]  Dept: [phone]  Dept Fax: [phone]    RETURN PATIENT PROGRESS NOTE  Date of patient's visit: 3/19/2024  Patient's Name:  Rosmery Ramos YOB: 1945            Patient Care Team:  Jade Heller MD as PCP - General (Family Medicine)  [facility]  Cyrus Hutson DPM as Physician (Podiatry)       Rosmery Ramos 78 y.o. female that presents for follow-up of   Chief Complaint   Patient presents with    Foot Pain     Right foot pain     Pt's primary care physician is Jade Heller MD last seen 06/12/2023  Symptoms began 7 month(s) ago and are unchanged .  Patient relates pain is Present.  Pain is rated 1 out of 10 and is described as intermittent, mild.  Treatments prior to today's visit include: previous acid treament .  She had surgeyr on the other side and it helped .  Currently denies F/C/N/V.     Allergies   Allergen Reactions    No Known Allergies        Past Medical History:   Diagnosis Date    Arthritis     Atrial fibrillation (HCC)     Cancer (HCC)     left breast cancer    Hyperlipidemia     Hypertension     Lumbar stenosis     Multiple thyroid nodules     Nonsmoker     Prediabetes     Stage 3a chronic kidney disease (CKD) (HCC)     Subclinical hyperthyroidism     SVT (supraventricular tachycardia) 2009    Vitamin D deficiency        Prior to Admission medications    Medication Sig Start Date End Date Taking? Authorizing Provider   amLODIPine (NORVASC) 5 MG tablet Take 2 tablets by mouth daily 3/8/24  Yes Jade Heller MD   metoprolol succinate (TOPROL XL) 50 MG extended release tablet Take 1 tablet by mouth daily 3/8/24  Yes Jade Heller MD   anastrozole (ARIMIDEX) 1 MG tablet Take 1 tablet by mouth daily 3/8/24  Yes Jade Heller MD   apixaban (ELIQUIS) 5 MG TABS tablet Take 1 tablet by mouth 2 times daily

## 2024-06-11 ENCOUNTER — OFFICE VISIT (OUTPATIENT)
Dept: PODIATRY | Age: 79
End: 2024-06-11
Payer: COMMERCIAL

## 2024-06-11 VITALS — BODY MASS INDEX: 25.48 KG/M2 | HEIGHT: 71 IN | WEIGHT: 182 LBS

## 2024-06-11 DIAGNOSIS — M79.672 LEFT FOOT PAIN: Primary | ICD-10-CM

## 2024-06-11 DIAGNOSIS — M79.676 PAIN OF FIFTH TOE: ICD-10-CM

## 2024-06-11 DIAGNOSIS — R26.2 TROUBLE WALKING: ICD-10-CM

## 2024-06-11 PROCEDURE — 1123F ACP DISCUSS/DSCN MKR DOCD: CPT | Performed by: PODIATRIST

## 2024-06-11 PROCEDURE — 99213 OFFICE O/P EST LOW 20 MIN: CPT | Performed by: PODIATRIST

## 2024-06-11 NOTE — PROGRESS NOTES
4 times daily 3/8/24  Yes Jade Heller MD   nystatin (MYCOSTATIN) 922866 UNIT/GM cream Apply topically 2 times daily. 3/8/24  Yes Jade Heller MD   acetaminophen (APAP EXTRA STRENGTH) 500 MG tablet Take 2 tablets by mouth every 6 hours as needed for Pain 3/8/24  Yes Jade Heller MD   rosuvastatin (CRESTOR) 20 MG tablet TAKE 1 TABLET BY MOUTH ONCE  DAILY 8/22/23  Yes Jade Heller MD   Handicap Placard MISC by Does not apply route Temporary use- not to exceed more than 5 years  Patient unable to walk more than 200 feet without needing to stop to rest 4/26/22  Yes Cyrus Hutson DPM       Review of Systems    Review of Systems:  History obtained from chart review and the patient  General ROS: negative for - chills, fatigue, fever, night sweats or weight gain  Constitutional: Negative for chills, diaphoresis, fatigue, fever and unexpected weight change.  Musculoskeletal: Positive for arthralgias, gait problem and joint swelling.  Neurological ROS: negative for - behavioral changes, confusion, headaches or seizures. Negative for weakness and numbness.   Dermatological ROS: negative for - mole changes, rash  Cardiovascular: Negative for leg swelling.   Gastrointestinal: Negative for constipation, diarrhea, nausea and vomiting.         Lower Extremity Physical Examination:     Vitals: There were no vitals filed for this visit.  General: AAO x 3 in NAD.   Dermatologic Exam:  Skin lesion/ulceration Absent .   Skin No rashes or nodules noted..       Musculoskeletal:     1st MPJ ROM decreased, Bilateral.  Muscle strength 5/5, Bilateral.  Pain present upon palpation of left 5th toe at the pipj  there is loosness to the toe in all plains .  The toe is in a rectus positinioning and shortened compared to the right foot. .  Medial longitudinal arch, Bilateral WNL.  Ankle ROM WNL,Bilateral.    Dorsally contracted digits absent digits 1-5 Bilateral.     Vascular: DP and PT pulses palpable 2/4, Bilateral.  CFT <3

## 2024-06-24 RX ORDER — ROSUVASTATIN CALCIUM 20 MG/1
20 TABLET, COATED ORAL DAILY
Qty: 100 TABLET | Refills: 2 | OUTPATIENT
Start: 2024-06-24

## 2024-07-17 RX ORDER — ROSUVASTATIN CALCIUM 20 MG/1
20 TABLET, COATED ORAL DAILY
Qty: 90 TABLET | Refills: 1 | Status: SHIPPED | OUTPATIENT
Start: 2024-07-17

## 2024-07-21 ENCOUNTER — TELEPHONE (OUTPATIENT)
Age: 79
End: 2024-07-21

## 2024-07-21 NOTE — TELEPHONE ENCOUNTER
Hello - is it possible to schedule this patient for 40 minutes instead of 20 for her next appt? It says for lab results, I have not seen her >1 year and she has many chronic medical problems.    Can schedule her for two visits  1) MAW  2) chronic medical conditions.

## 2024-07-23 ENCOUNTER — APPOINTMENT (OUTPATIENT)
Dept: GENERAL RADIOLOGY | Age: 79
End: 2024-07-23
Payer: COMMERCIAL

## 2024-07-23 ENCOUNTER — HOSPITAL ENCOUNTER (OUTPATIENT)
Age: 79
Setting detail: OBSERVATION
Discharge: HOME | End: 2024-07-25
Attending: EMERGENCY MEDICINE | Admitting: EMERGENCY MEDICINE
Payer: COMMERCIAL

## 2024-07-23 DIAGNOSIS — I47.10 PAROXYSMAL SUPRAVENTRICULAR TACHYCARDIA (HCC): Primary | ICD-10-CM

## 2024-07-23 DIAGNOSIS — R79.89 ELEVATED TROPONIN: ICD-10-CM

## 2024-07-23 LAB
ANION GAP SERPL CALCULATED.3IONS-SCNC: 13 MMOL/L (ref 9–16)
BASOPHILS # BLD: 0.04 K/UL (ref 0–0.2)
BASOPHILS NFR BLD: 1 % (ref 0–2)
BUN SERPL-MCNC: 20 MG/DL (ref 8–23)
CALCIUM SERPL-MCNC: 9 MG/DL (ref 8.6–10.4)
CHLORIDE SERPL-SCNC: 107 MMOL/L (ref 98–107)
CO2 SERPL-SCNC: 19 MMOL/L (ref 20–31)
CREAT SERPL-MCNC: 1.5 MG/DL (ref 0.5–0.9)
EOSINOPHIL # BLD: 0.14 K/UL (ref 0–0.44)
EOSINOPHILS RELATIVE PERCENT: 4 % (ref 1–4)
ERYTHROCYTE [DISTWIDTH] IN BLOOD BY AUTOMATED COUNT: 13.8 % (ref 11.8–14.4)
GFR, ESTIMATED: 35 ML/MIN/1.73M2
GLUCOSE SERPL-MCNC: 103 MG/DL (ref 74–99)
HCT VFR BLD AUTO: 35.1 % (ref 36.3–47.1)
HGB BLD-MCNC: 11.1 G/DL (ref 11.9–15.1)
IMM GRANULOCYTES # BLD AUTO: <0.03 K/UL (ref 0–0.3)
IMM GRANULOCYTES NFR BLD: 0 %
LYMPHOCYTES NFR BLD: 1.72 K/UL (ref 1.1–3.7)
LYMPHOCYTES RELATIVE PERCENT: 43 % (ref 24–43)
MAGNESIUM SERPL-MCNC: 1.9 MG/DL (ref 1.6–2.4)
MCH RBC QN AUTO: 29 PG (ref 25.2–33.5)
MCHC RBC AUTO-ENTMCNC: 31.6 G/DL (ref 28.4–34.8)
MCV RBC AUTO: 91.6 FL (ref 82.6–102.9)
MONOCYTES NFR BLD: 0.31 K/UL (ref 0.1–1.2)
MONOCYTES NFR BLD: 8 % (ref 3–12)
NEUTROPHILS NFR BLD: 44 % (ref 36–65)
NEUTS SEG NFR BLD: 1.72 K/UL (ref 1.5–8.1)
NRBC BLD-RTO: 0 PER 100 WBC
PLATELET # BLD AUTO: 191 K/UL (ref 138–453)
PMV BLD AUTO: 10.7 FL (ref 8.1–13.5)
POTASSIUM SERPL-SCNC: 3.4 MMOL/L (ref 3.7–5.3)
RBC # BLD AUTO: 3.83 M/UL (ref 3.95–5.11)
SODIUM SERPL-SCNC: 139 MMOL/L (ref 136–145)
TROPONIN I SERPL HS-MCNC: 17 NG/L (ref 0–14)
TROPONIN I SERPL HS-MCNC: 24 NG/L (ref 0–14)
TSH SERPL DL<=0.05 MIU/L-ACNC: 0.93 UIU/ML (ref 0.27–4.2)
WBC OTHER # BLD: 3.9 K/UL (ref 3.5–11.3)

## 2024-07-23 PROCEDURE — 85025 COMPLETE CBC W/AUTO DIFF WBC: CPT

## 2024-07-23 PROCEDURE — 6360000002 HC RX W HCPCS: Performed by: STUDENT IN AN ORGANIZED HEALTH CARE EDUCATION/TRAINING PROGRAM

## 2024-07-23 PROCEDURE — 93005 ELECTROCARDIOGRAM TRACING: CPT | Performed by: EMERGENCY MEDICINE

## 2024-07-23 PROCEDURE — 99285 EMERGENCY DEPT VISIT HI MDM: CPT

## 2024-07-23 PROCEDURE — 96365 THER/PROPH/DIAG IV INF INIT: CPT

## 2024-07-23 PROCEDURE — 84443 ASSAY THYROID STIM HORMONE: CPT

## 2024-07-23 PROCEDURE — 84484 ASSAY OF TROPONIN QUANT: CPT

## 2024-07-23 PROCEDURE — 83735 ASSAY OF MAGNESIUM: CPT

## 2024-07-23 PROCEDURE — 71046 X-RAY EXAM CHEST 2 VIEWS: CPT

## 2024-07-23 PROCEDURE — 80048 BASIC METABOLIC PNL TOTAL CA: CPT

## 2024-07-23 PROCEDURE — G0378 HOSPITAL OBSERVATION PER HR: HCPCS

## 2024-07-23 RX ORDER — MAGNESIUM SULFATE IN WATER 40 MG/ML
2000 INJECTION, SOLUTION INTRAVENOUS ONCE
Status: COMPLETED | OUTPATIENT
Start: 2024-07-23 | End: 2024-07-24

## 2024-07-23 RX ORDER — SODIUM CHLORIDE 0.9 % (FLUSH) 0.9 %
5-40 SYRINGE (ML) INJECTION PRN
Status: DISCONTINUED | OUTPATIENT
Start: 2024-07-23 | End: 2024-07-25 | Stop reason: HOSPADM

## 2024-07-23 RX ORDER — POTASSIUM CHLORIDE 20 MEQ/1
40 TABLET, EXTENDED RELEASE ORAL PRN
Status: DISCONTINUED | OUTPATIENT
Start: 2024-07-23 | End: 2024-07-25 | Stop reason: HOSPADM

## 2024-07-23 RX ORDER — ONDANSETRON 4 MG/1
4 TABLET, ORALLY DISINTEGRATING ORAL EVERY 8 HOURS PRN
Status: DISCONTINUED | OUTPATIENT
Start: 2024-07-23 | End: 2024-07-25 | Stop reason: HOSPADM

## 2024-07-23 RX ORDER — ACETAMINOPHEN 650 MG/1
650 SUPPOSITORY RECTAL EVERY 6 HOURS PRN
Status: DISCONTINUED | OUTPATIENT
Start: 2024-07-23 | End: 2024-07-25 | Stop reason: HOSPADM

## 2024-07-23 RX ORDER — SODIUM CHLORIDE 9 MG/ML
INJECTION, SOLUTION INTRAVENOUS PRN
Status: DISCONTINUED | OUTPATIENT
Start: 2024-07-23 | End: 2024-07-25 | Stop reason: HOSPADM

## 2024-07-23 RX ORDER — POTASSIUM CHLORIDE 7.45 MG/ML
10 INJECTION INTRAVENOUS PRN
Status: DISCONTINUED | OUTPATIENT
Start: 2024-07-23 | End: 2024-07-25 | Stop reason: HOSPADM

## 2024-07-23 RX ORDER — SODIUM CHLORIDE 0.9 % (FLUSH) 0.9 %
5-40 SYRINGE (ML) INJECTION EVERY 12 HOURS SCHEDULED
Status: DISCONTINUED | OUTPATIENT
Start: 2024-07-23 | End: 2024-07-25 | Stop reason: HOSPADM

## 2024-07-23 RX ORDER — ONDANSETRON 2 MG/ML
4 INJECTION INTRAMUSCULAR; INTRAVENOUS EVERY 6 HOURS PRN
Status: DISCONTINUED | OUTPATIENT
Start: 2024-07-23 | End: 2024-07-25 | Stop reason: HOSPADM

## 2024-07-23 RX ORDER — POLYETHYLENE GLYCOL 3350 17 G/17G
17 POWDER, FOR SOLUTION ORAL DAILY PRN
Status: DISCONTINUED | OUTPATIENT
Start: 2024-07-23 | End: 2024-07-25 | Stop reason: HOSPADM

## 2024-07-23 RX ORDER — ACETAMINOPHEN 325 MG/1
650 TABLET ORAL EVERY 6 HOURS PRN
Status: DISCONTINUED | OUTPATIENT
Start: 2024-07-23 | End: 2024-07-25 | Stop reason: HOSPADM

## 2024-07-23 RX ORDER — MAGNESIUM SULFATE IN WATER 40 MG/ML
2000 INJECTION, SOLUTION INTRAVENOUS PRN
Status: DISCONTINUED | OUTPATIENT
Start: 2024-07-23 | End: 2024-07-25 | Stop reason: HOSPADM

## 2024-07-23 RX ADMIN — MAGNESIUM SULFATE HEPTAHYDRATE 2000 MG: 40 INJECTION, SOLUTION INTRAVENOUS at 23:48

## 2024-07-23 NOTE — ED PROVIDER NOTES
[facility] ED  Emergency Department Encounter  Emergency Medicine Resident     Pt Name:Rosmery Ramos  MRN: [id number]  Birthdate 1945  Date of evaluation: 7/23/24  PCP:  Jade Heller MD  Note Started: 7:50 PM EDT      CHIEF COMPLAINT       Chief Complaint   Patient presents with    Tachycardia       HISTORY OF PRESENT ILLNESS  (Location/Symptom, Timing/Onset, Context/Setting, Quality, Duration, Modifying Factors, Severity.)      Rosmery Ramos is a 78 y.o. female past medical history of atrial fibrillation, hypertension, SVT, presenting for assessment after being diagnosed with an episode of SVT earlier's afternoon.  Symptom onset around 3 PM.  Patient notes that she developed a sensation of palpitations.  This was accompanied by generalized malaise, diaphoresis and intermittent shortness of breath.  She contacted EMS and was found to be in SVT.  Was given a dose of 6 mg of adenosine which did not terminate the rhythm, this was followed by 12 mg and was successful in terminating the rhythm.  At the time assessment, patient is stating that she feels well with no complaints.  She does report a history of breast cancer that is status postlumpectomy and radiation and is now in remission.  No no caffeine supplements taken today.  Not on any estrogen supplementation.  No recent travel or surgeries    PAST MEDICAL / SURGICAL / SOCIAL / FAMILY HISTORY      has a past medical history of Arthritis, Atrial fibrillation (HCC), Cancer (HCC), Hyperlipidemia, Hypertension, Lumbar stenosis, Multiple thyroid nodules, Nonsmoker, Prediabetes, Stage 3a chronic kidney disease (CKD) (HCC), Subclinical hyperthyroidism, SVT (supraventricular tachycardia) (HCC), and Vitamin D deficiency.       has a past surgical history that includes Colonoscopy; Tibia fracture surgery (Right); eye surgery; arthroplasty (Left, 8/5/2020); Breast surgery; Knee arthroscopy (Right); and arthroplasty (Left,  ordered.  Radiology: ordered.    Risk  OTC drugs.  Prescription drug management.  Decision regarding hospitalization.        EKG  Normal sinus rhythm; QTc 498; no acute ST changes; no ectopy; normal conduction    All EKG's are interpreted by the Emergency Department Physician who either signs or Co-signs this chart in the absence of a cardiologist.    EMERGENCY DEPARTMENT COURSE:  Patient presents emergency department unremarkable vital signs.  She is in sinus rhythm.  Previous SVT that was treated with adenosine.  Patient is currently asymptomatic.  Will obtain routine labs, plan to reassess.    11:39 PM  Patient reassessed multiple times.  Stable.  No recurrent SVT.  Initial troponin 17, repeat is 24.     Remainder of her labs are unremarkable.    I have informed the cardiology service of this delta troponin.  Advised that she will be admitted to the observation unit and cardiology will assess her in the morning.  Patient is agreeable               PROCEDURES:      CONSULTS:  IP CONSULT TO CARDIOLOGY    CRITICAL CARE:  There was significant risk of life threatening deterioration of patient's condition requiring my direct management. Critical care time minutes, excluding any documented procedures.    FINAL IMPRESSION      1. Paroxysmal supraventricular tachycardia (HCC)          DISPOSITION / PLAN     DISPOSITION Admitted 07/23/2024 11:23:15 PM      PATIENT REFERRED TO:  No follow-up provider specified.    DISCHARGE MEDICATIONS:  New Prescriptions    No medications on file       Damon Murphy MD  Emergency Medicine Resident    (Please note that portions of this note were completed with a voice recognition program.  Efforts were made to edit the dictations but occasionally words are mis-transcribed.)

## 2024-07-23 NOTE — ED PROVIDER NOTES
I performed a history and physical examination of the patient and discussed management with the resident. I reviewed the resident’s note and agree with the documented findings and plan of care. Any areas of disagreement are noted on the chart. I was personally present for the key portions of any procedures. I have documented in the chart those procedures where I was not present during the key portions. Unless noted in my documentation, I agree with the chief complaint, past medical history, past surgical history, allergies, medications, social and family history as documented. Documentation of the HPI, Physical Exam and Medical Decision Making performed by medical students or scribes is based on my personal performance of the HPI, PE and MDM.   For Phys Assistant/ Nurse Practitioner cases/documentation I have personally evaluated this patient and have completed at least one if not all key elements of the E/M (history, physical exam, and MDM). I find the patient's history and physical exam are consistent with the NP/PA documentation. I agree with the care provided, treatment rendered, disposition and followup plan.   Additional findings are as noted.    Mike Bhagat MD  Attending Emergency  Physician      This patient presented to the emergency department via EMS after she experienced sudden onset of tachycardia while at work tonight.  EMS indicates that the obtain a twelve-lead which was consistent with SVT and administered adenosine with reversion to sinus rhythm.  Patient indicates she has a history of SVT and atrial fibrillation.  She is currently on Eliquis as well as metoprolol.  She reports has been taking her medication without difficulty.  She denies any symptoms currently.  No chest pain.  No shortness of breath.  No palpitations.  No lightheadedness.  She reports has been eating and drinking normally.  No history of nausea or vomiting or diarrhea.   Awake, alert, cooperative, responsive. Speech  fluent, normal comprehension.  Lungs clear bilaterally.  No rales, rhonchi, wheezes, stridor, retractions.  Cardiac-S1S2, RRR, no murmur, rub, or gallop.  Abdomen soft, nondistended, nontender.  No organomegaly, mass, bruit.  Normal bowel sounds.    EKG Interpretation    Interpreted by me    Rhythm: normal sinus   Rate: 88  Axis: normal  Ectopy: none  Conduction: normal  ST Segments: no acute change  T Waves: no acute change  Q Waves: none  Prolonged QT with a QT interval of 412 and a QTc of 498 ms respectively.  Clinical Impression: no acute changes and no clinically significant morphologic changes noted when compared to prior tracing dated 13 December 2023.      I reviewed the patient's lab results.  With the exception of the troponin which is still pending there are no clinically significant findings.  Impression: SVT, recurrent, resolved.  Plan: Weight results of all labs and most likely discharge.       Mike Bhagat MD  07/23/24 2052            Mike Bhagat MD  07/23/24 2054      First troponin is 17 which compares favorably with patient's apparent baseline values.  Second troponin is pending.    Patient signed out to Dr. Escobar at the completion of my shift.     Mike Bhagat MD  07/23/24 [id number]

## 2024-07-24 LAB
ECHO BSA: 2.03 M2
ERYTHROCYTE [DISTWIDTH] IN BLOOD BY AUTOMATED COUNT: 13.9 % (ref 11.8–14.4)
HCT VFR BLD AUTO: 37 % (ref 36.3–47.1)
HGB BLD-MCNC: 11.8 G/DL (ref 11.9–15.1)
MCH RBC QN AUTO: 29.4 PG (ref 25.2–33.5)
MCHC RBC AUTO-ENTMCNC: 31.9 G/DL (ref 28.4–34.8)
MCV RBC AUTO: 92.3 FL (ref 82.6–102.9)
NRBC BLD-RTO: 0 PER 100 WBC
PARTIAL THROMBOPLASTIN TIME: 33.2 SEC (ref 23–36.5)
PLATELET # BLD AUTO: 197 K/UL (ref 138–453)
PMV BLD AUTO: 10.6 FL (ref 8.1–13.5)
RBC # BLD AUTO: 4.01 M/UL (ref 3.95–5.11)
TROPONIN I SERPL HS-MCNC: 34 NG/L (ref 0–14)
TROPONIN I SERPL HS-MCNC: 46 NG/L (ref 0–14)
WBC OTHER # BLD: 3.4 K/UL (ref 3.5–11.3)

## 2024-07-24 PROCEDURE — 85027 COMPLETE CBC AUTOMATED: CPT

## 2024-07-24 PROCEDURE — 85730 THROMBOPLASTIN TIME PARTIAL: CPT

## 2024-07-24 PROCEDURE — 93454 CORONARY ARTERY ANGIO S&I: CPT | Performed by: INTERNAL MEDICINE

## 2024-07-24 PROCEDURE — 2580000003 HC RX 258: Performed by: STUDENT IN AN ORGANIZED HEALTH CARE EDUCATION/TRAINING PROGRAM

## 2024-07-24 PROCEDURE — 6360000004 HC RX CONTRAST MEDICATION: Performed by: INTERNAL MEDICINE

## 2024-07-24 PROCEDURE — 6360000002 HC RX W HCPCS: Performed by: INTERNAL MEDICINE

## 2024-07-24 PROCEDURE — 93005 ELECTROCARDIOGRAM TRACING: CPT | Performed by: EMERGENCY MEDICINE

## 2024-07-24 PROCEDURE — 6370000000 HC RX 637 (ALT 250 FOR IP): Performed by: STUDENT IN AN ORGANIZED HEALTH CARE EDUCATION/TRAINING PROGRAM

## 2024-07-24 PROCEDURE — C1893 INTRO/SHEATH, FIXED,NON-PEEL: HCPCS | Performed by: INTERNAL MEDICINE

## 2024-07-24 PROCEDURE — 99152 MOD SED SAME PHYS/QHP 5/>YRS: CPT | Performed by: INTERNAL MEDICINE

## 2024-07-24 PROCEDURE — 84484 ASSAY OF TROPONIN QUANT: CPT

## 2024-07-24 PROCEDURE — 36415 COLL VENOUS BLD VENIPUNCTURE: CPT

## 2024-07-24 PROCEDURE — 96366 THER/PROPH/DIAG IV INF ADDON: CPT

## 2024-07-24 PROCEDURE — 93458 L HRT ARTERY/VENTRICLE ANGIO: CPT | Performed by: INTERNAL MEDICINE

## 2024-07-24 PROCEDURE — 99222 1ST HOSP IP/OBS MODERATE 55: CPT | Performed by: INTERNAL MEDICINE

## 2024-07-24 PROCEDURE — 96361 HYDRATE IV INFUSION ADD-ON: CPT

## 2024-07-24 PROCEDURE — 2709999900 HC NON-CHARGEABLE SUPPLY: Performed by: INTERNAL MEDICINE

## 2024-07-24 PROCEDURE — 93452 LEFT HRT CATH W/VENTRCLGRPHY: CPT | Performed by: INTERNAL MEDICINE

## 2024-07-24 PROCEDURE — G0378 HOSPITAL OBSERVATION PER HR: HCPCS

## 2024-07-24 RX ORDER — AMLODIPINE BESYLATE 10 MG/1
10 TABLET ORAL DAILY
Status: DISCONTINUED | OUTPATIENT
Start: 2024-07-24 | End: 2024-07-25 | Stop reason: HOSPADM

## 2024-07-24 RX ORDER — HEPARIN SODIUM 1000 [USP'U]/ML
2000 INJECTION, SOLUTION INTRAVENOUS; SUBCUTANEOUS PRN
Status: DISCONTINUED | OUTPATIENT
Start: 2024-07-24 | End: 2024-07-24

## 2024-07-24 RX ORDER — HEPARIN SODIUM 1000 [USP'U]/ML
4000 INJECTION, SOLUTION INTRAVENOUS; SUBCUTANEOUS ONCE
Status: DISCONTINUED | OUTPATIENT
Start: 2024-07-24 | End: 2024-07-24

## 2024-07-24 RX ORDER — METOPROLOL SUCCINATE 25 MG/1
50 TABLET, EXTENDED RELEASE ORAL DAILY
Status: DISCONTINUED | OUTPATIENT
Start: 2024-07-24 | End: 2024-07-25 | Stop reason: HOSPADM

## 2024-07-24 RX ORDER — SODIUM CHLORIDE 9 MG/ML
INJECTION, SOLUTION INTRAVENOUS CONTINUOUS
Status: DISCONTINUED | OUTPATIENT
Start: 2024-07-24 | End: 2024-07-25 | Stop reason: HOSPADM

## 2024-07-24 RX ORDER — ROSUVASTATIN CALCIUM 20 MG/1
20 TABLET, COATED ORAL DAILY
Status: DISCONTINUED | OUTPATIENT
Start: 2024-07-24 | End: 2024-07-25 | Stop reason: HOSPADM

## 2024-07-24 RX ORDER — MIDAZOLAM HYDROCHLORIDE 1 MG/ML
INJECTION INTRAMUSCULAR; INTRAVENOUS PRN
Status: DISCONTINUED | OUTPATIENT
Start: 2024-07-24 | End: 2024-07-24 | Stop reason: HOSPADM

## 2024-07-24 RX ORDER — HEPARIN SODIUM 10000 [USP'U]/100ML
5-30 INJECTION, SOLUTION INTRAVENOUS CONTINUOUS
Status: DISCONTINUED | OUTPATIENT
Start: 2024-07-24 | End: 2024-07-24

## 2024-07-24 RX ORDER — SODIUM CHLORIDE 0.9 % (FLUSH) 0.9 %
5-40 SYRINGE (ML) INJECTION PRN
Status: CANCELLED | OUTPATIENT
Start: 2024-07-24

## 2024-07-24 RX ORDER — HEPARIN SODIUM 1000 [USP'U]/ML
4000 INJECTION, SOLUTION INTRAVENOUS; SUBCUTANEOUS PRN
Status: DISCONTINUED | OUTPATIENT
Start: 2024-07-24 | End: 2024-07-24

## 2024-07-24 RX ORDER — HEPARIN SODIUM 1000 [USP'U]/ML
INJECTION, SOLUTION INTRAVENOUS; SUBCUTANEOUS PRN
Status: DISCONTINUED | OUTPATIENT
Start: 2024-07-24 | End: 2024-07-24 | Stop reason: HOSPADM

## 2024-07-24 RX ORDER — SODIUM CHLORIDE 9 MG/ML
INJECTION, SOLUTION INTRAVENOUS PRN
Status: CANCELLED | OUTPATIENT
Start: 2024-07-24

## 2024-07-24 RX ORDER — 0.9 % SODIUM CHLORIDE 0.9 %
250 INTRAVENOUS SOLUTION INTRAVENOUS ONCE
Status: COMPLETED | OUTPATIENT
Start: 2024-07-24 | End: 2024-07-25

## 2024-07-24 RX ORDER — ACETAMINOPHEN 325 MG/1
650 TABLET ORAL EVERY 4 HOURS PRN
Status: CANCELLED | OUTPATIENT
Start: 2024-07-24

## 2024-07-24 RX ORDER — FENTANYL CITRATE 50 UG/ML
INJECTION, SOLUTION INTRAMUSCULAR; INTRAVENOUS PRN
Status: DISCONTINUED | OUTPATIENT
Start: 2024-07-24 | End: 2024-07-24 | Stop reason: HOSPADM

## 2024-07-24 RX ORDER — ANASTROZOLE 1 MG/1
1 TABLET ORAL DAILY
Status: DISCONTINUED | OUTPATIENT
Start: 2024-07-24 | End: 2024-07-25 | Stop reason: HOSPADM

## 2024-07-24 RX ORDER — SODIUM CHLORIDE 0.9 % (FLUSH) 0.9 %
5-40 SYRINGE (ML) INJECTION EVERY 12 HOURS SCHEDULED
Status: CANCELLED | OUTPATIENT
Start: 2024-07-24

## 2024-07-24 RX ORDER — AMLODIPINE BESYLATE 5 MG/1
10 TABLET ORAL DAILY
Qty: 90 TABLET | Refills: 1 | Status: SHIPPED | OUTPATIENT
Start: 2024-07-24

## 2024-07-24 RX ADMIN — APIXABAN 5 MG: 5 TABLET, FILM COATED ORAL at 08:36

## 2024-07-24 RX ADMIN — ANASTROZOLE 1 MG: 1 TABLET, COATED ORAL at 08:40

## 2024-07-24 RX ADMIN — SODIUM CHLORIDE 250 ML: 9 INJECTION, SOLUTION INTRAVENOUS at 14:47

## 2024-07-24 RX ADMIN — SODIUM CHLORIDE: 9 INJECTION, SOLUTION INTRAVENOUS at 20:38

## 2024-07-24 RX ADMIN — ROSUVASTATIN CALCIUM 20 MG: 20 TABLET, FILM COATED ORAL at 08:36

## 2024-07-24 RX ADMIN — SODIUM CHLORIDE, PRESERVATIVE FREE 10 ML: 5 INJECTION INTRAVENOUS at 20:37

## 2024-07-24 RX ADMIN — AMLODIPINE BESYLATE 10 MG: 10 TABLET ORAL at 08:36

## 2024-07-24 RX ADMIN — POTASSIUM CHLORIDE 40 MEQ: 1500 TABLET, EXTENDED RELEASE ORAL at 20:42

## 2024-07-24 RX ADMIN — APIXABAN 5 MG: 5 TABLET, FILM COATED ORAL at 22:02

## 2024-07-24 ASSESSMENT — LIFESTYLE VARIABLES
HOW OFTEN DO YOU HAVE A DRINK CONTAINING ALCOHOL: NEVER
HOW MANY STANDARD DRINKS CONTAINING ALCOHOL DO YOU HAVE ON A TYPICAL DAY: PATIENT DOES NOT DRINK

## 2024-07-24 NOTE — PROGRESS NOTES
Dr Perrin notified of creatinine of 1.5 and that IV fluids had not been started in OBS.  Order ofr 250 bolus received.  See Mar

## 2024-07-24 NOTE — CARE COORDINATION
DISCHARGE PLANNING EVALUATION: OP/OBSERVATION        7/24/24, 8:47 AM EDT    Rosmery Ramos         Location: OBS 21/21-1   Reason for hospitalization: Paroxysmal supraventricular tachycardia (HCC) [I47.10]  Elevated troponin [R79.89]     CM Services requested for transitional needs.     PCP: Jade Heller MD    Transportation/Food Security/Housing Addressed:  No issues identified.     Equipment needs: none    Case Management Services Information Letter Provided [x]    Transition plan: From home alone plan is to return home has ride

## 2024-07-24 NOTE — ED PROVIDER NOTES
Faculty Sign-Out Attestation  Handoff taken on the following patient from prior Attending Physician: Olayinka  Note Started: 11:01 PM EDT    I was available and discussed any additional care issues that arose and coordinated the management plans with the resident(s) caring for the patient during my duty period. Any areas of disagreement with resident’s documentation of care or procedures are noted on the chart. I was personally present for the key portions of any/all procedures during my duty period. I have documented in the chart those procedures where I was not present during the andrew portions.    Tachy, on eliquis, &*metoprolol  + delta trop,   Needing observation >     Jorge Escobar DO  Attending Physician       Jorge Escobar,   07/23/24 7225

## 2024-07-24 NOTE — H&P
[facility]  CDU / OBSERVATION ENCOUNTER  ATTENDING NOTE     Pt Name: Rosmery Ramos  MRN: [id number]  Birthdate 1945  Date of evaluation: 7/24/24  Patient's PCP is :  Jade Heller MD    CHIEF COMPLAINT       Chief Complaint   Patient presents with    Tachycardia         HISTORY OF PRESENT ILLNESS    Rosmery Ramos is a 78 y.o. female who presents SVT with history of A-fib.  Patient explains episode of palpitations, diaphoresis, SOB.  Treated with adenosine per EMS that did terminate rhythm.  History of breast lumpectomy and radiation now in remission.      History was obtained in part through review of the ED chart. When possible, a direct discussion was had with ED nurses, residents, and attendings  REVIEW OF SYSTEMS       General ROS - No fevers, No malaise   Ophthalmic ROS - No discharge, No changes in vision  ENT ROS -  No sore throat, No rhinorrhea,   Respiratory ROS - no shortness of breath, no cough, no  wheezing  Cardiovascular ROS - No chest pain, no dyspnea on exertion  Gastrointestinal ROS - No abdominal pain, no nausea or vomiting, no change in bowel habits, no black or bloody stools  Genito-Urinary ROS - No dysuria, trouble voiding, or hematuria  Musculoskeletal ROS - No myalgias, No arthalgias  Neurological ROS - No headache, no dizziness/lightheadedness, No focal weakness, no loss of sensation  Dermatological ROS - No lesions, No rash     (PQRS) Advance directives on face sheet per hospital policy. No change unless specifically mentioned in chart    PAST MEDICAL HISTORY    has a past medical history of Arthritis, Atrial fibrillation (HCC), Cancer (HCC), Hyperlipidemia, Hypertension, Lumbar stenosis, Multiple thyroid nodules, Nonsmoker, Prediabetes, Stage 3a chronic kidney disease (CKD) (HCC), Subclinical hyperthyroidism, SVT (supraventricular tachycardia) (HCC), and Vitamin D deficiency.    I have reviewed the past medical history with the patient and it is pertinent to  abuse/use.    PHYSICAL EXAM     INITIAL VITALS:  height is 1.803 m (5' 11\") and weight is 82.6 kg (182 lb 1.6 oz). Her oral temperature is 98.1 °F (36.7 °C). Her blood pressure is 131/88 and her pulse is 70. Her respiration is 16 and oxygen saturation is 96%.      CONSTITUTIONAL: AOx4, no apparent distress, appears stated age 78   HEAD: normocephalic, atraumatic   EYES: PERRLA, EOMI    ENT: moist mucous membranes, uvula midline   NECK: supple, symmetric   BACK: symmetric   LUNGS: clear to auscultation bilaterally   CARDIOVASCULAR: regular rate and rhythm, no murmurs, rubs or gallops   ABDOMEN: soft, non-tender, non-distended with normal active bowel sounds   NEUROLOGIC:  MAEx4, no focal sensory or motor deficits   MUSCULOSKELETAL: no clubbing, cyanosis or edema   SKIN: no rash or wounds       DIFFERENTIAL DIAGNOSIS/MDM:     FROM ED MEDICAL DECISION MAKING NOTE:     Given patient PMH, HPI, physical exam, differential diagnosis includes arrhythmia, electrolyte derangement, ACS, will obtain cardiac workup.  Observation stay negative for likely cardiac evaluation by cardiology team    DIAGNOSTIC RESULTS     EKG: All EKG's are interpreted by the Observation Physician who either signs or Co-signs this chart in the absence of a cardiologist.    EKG Interpretation    Normal sinus rhythm; QTc 498; no acute ST changes; no ectopy; normal conduction     Skyler Soria MD        RADIOLOGY:   I directly visualized the following  images and reviewed the radiologist interpretations:    XR CHEST (2 VW)    Result Date: 7/23/2024  EXAMINATION: TWO XRAY VIEWS OF THE CHEST 7/23/2024 7:31 pm COMPARISON: None. HISTORY: ORDERING SYSTEM PROVIDED HISTORY: SVT; ro cardiopulmonary process TECHNOLOGIST PROVIDED HISTORY: SVT; ro cardiopulmonary process Reason for Exam: SVT, tachycardia FINDINGS: Cardiac silhouette is at the upper limits of normal in size.  No focal airspace consolidation.  No pleural effusion or pneumothorax.  No acute osseous

## 2024-07-24 NOTE — PROGRESS NOTES
Patient admitted, consent signed and questions answered. Patient ready for procedure. Call light to reach with side rails up 2 of 2. Bilateral groin hair clipped with writer and Lashanda present.  No one at bedside with patient.  History and physical complete.

## 2024-07-24 NOTE — PROGRESS NOTES
Writer was told by previous nurse, heparin or fluids were not started before patient went to cath lab. Writer sent a secure message to primary to see if fluids should be running at this moment, awaiting response.

## 2024-07-24 NOTE — ED NOTES
Patient presents to ED via EMS with complaints of SVT PTA. Patient states she was at work when she started to feel sweaty and dizzy around 1300 today. Upon EMS arrival, patient was in SVT with a heart rate of 180. Patient received 6 mg adenosine with no change and then 12 mg which converted her into sinus rhythm. Upon arrival, patient was in sinus rhythm. Patient denies chest pain or SOB. Patient has hx of afib and takes Eliquis. Patient is A&O x4 and connected to full cardiac monitor. IV in place, EKG done, call light within reach.

## 2024-07-24 NOTE — ED NOTES
loss of consciousness): No, Age > 70: Yes, Altered Mental Status, Intoxication with alcohol or substance confusion (Disorientation, impaired judgment, poor safety awaremess, or inability to follow instructions): No, Impaired Mobility: Ambulates or transfers with assistive devices or assistance; Unable to ambulate or transer.: No, Nursing Judgement: No    Diagnosis:  DISPOSITION Admitted 07/23/2024 11:23:15 PM   Final diagnoses:   Paroxysmal supraventricular tachycardia (HCC)        Consults:  IP CONSULT TO CARDIOLOGY     Treatment Team:   Treatment Team: Attending Provider: Boo Mai MD; Registered Nurse: Masha Beaver RN; Resident: Damon Murphy MD; Consulting Physician: Mendel Mendiola DO    Treatment:          Skin Assessment:        Pain Score:  Pain Assessment  Pain Assessment: None - Denies Pain      SOCIAL HISTORY       Social History     Socioeconomic History    Marital status: Single     Spouse name: None    Number of children: None    Years of education: None    Highest education level: None   Tobacco Use    Smoking status: Never    Smokeless tobacco: Never   Vaping Use    Vaping Use: Never used   Substance and Sexual Activity    Alcohol use: Yes     Comment: rarely 2-3 times a year 2 drinks/wine    Drug use: Never     Social Determinants of Health     Financial Resource Strain: Low Risk  (6/12/2023)    Overall Financial Resource Strain (CARDIA)     Difficulty of Paying Living Expenses: Not very hard   Transportation Needs: Unknown (6/12/2023)    PRAPARE - Transportation     Lack of Transportation (Non-Medical): No   Housing Stability: Unknown (6/12/2023)    Housing Stability Vital Sign     Unstable Housing in the Last Year: No       FAMILY HISTORY     History reviewed. No pertinent family history.    ALLERGIES     No known allergies    CURRENT MEDICATIONS       Previous Medications    ACETAMINOPHEN (APAP EXTRA STRENGTH) 500 MG TABLET    Take 2 tablets by mouth every 6 hours as needed for Pain     AMLODIPINE (NORVASC) 5 MG TABLET    Take 2 tablets by mouth daily    ANASTROZOLE (ARIMIDEX) 1 MG TABLET    Take 1 tablet by mouth daily    APIXABAN (ELIQUIS) 5 MG TABS TABLET    Take 1 tablet by mouth 2 times daily    DICLOFENAC SODIUM (VOLTAREN) 1 % GEL    Apply 4 g topically 4 times daily    HANDICAP PLACARD MISC    by Does not apply route Temporary use- not to exceed more than 5 years  Patient unable to walk more than 200 feet without needing to stop to rest    METOPROLOL SUCCINATE (TOPROL XL) 50 MG EXTENDED RELEASE TABLET    Take 1 tablet by mouth daily    NYSTATIN (MYCOSTATIN) 753796 UNIT/GM CREAM    Apply topically 2 times daily.    ROSUVASTATIN (CRESTOR) 20 MG TABLET    Take 1 tablet by mouth daily     Orders Placed This Encounter   Medications    sodium chloride flush 0.9 % injection 5-40 mL    sodium chloride flush 0.9 % injection 5-40 mL    0.9 % sodium chloride infusion    OR Linked Order Group     potassium chloride (KLOR-CON M) extended release tablet 40 mEq     potassium bicarb-citric acid (EFFER-K) effervescent tablet 40 mEq     potassium chloride 10 mEq/100 mL IVPB (Peripheral Line)    magnesium sulfate 2000 mg in 50 mL IVPB premix    OR Linked Order Group     ondansetron (ZOFRAN-ODT) disintegrating tablet 4 mg     ondansetron (ZOFRAN) injection 4 mg    polyethylene glycol (GLYCOLAX) packet 17 g    OR Linked Order Group     acetaminophen (TYLENOL) tablet 650 mg     acetaminophen (TYLENOL) suppository 650 mg       SURGICAL HISTORY       Past Surgical History:   Procedure Laterality Date    ARTHROPLASTY Left 8/5/2020    DEROTATIONAL  ARTHROPLASTY LEFT 5TH TOE performed by Vel Luther DPM at [facility] OR    ARTHROPLASTY Left 6/17/2022    LEFT FOOT 5TH  ARTHROPLASTY, LEFT FOOT ROTATIONAL FLAP CLOSURE 2.2CM performed by Cyrus Hutson DPM at [facility] OR    BREAST SURGERY      left breast lumpectomy feb 2020    COLONOSCOPY      EYE SURGERY      bilateral cataracts    KNEE ARTHROSCOPY Right     TIBIA

## 2024-07-24 NOTE — PROGRESS NOTES
[facility]  CDU / OBSERVATION ENCOUNTER  ATTENDING NOTE         I performed a history and physical examination of the patient and discussed management with the resident or midlevel provider. I reviewed the resident or midlevel provider's note and agree with the documented findings and plan of care. Any areas of disagreement are noted on the chart. I was personally present for the key portions of any procedures. I have documented in the chart those procedures where I was not present during the key portions. I have reviewed the nurses notes. I agree with the chief complaint, past medical history, past surgical history, allergies, medications, social and family history as documented unless otherwise noted below.    The Family history, social history, and ROS are effectively unchanged since admission unless noted elsewhere in the chart.     This patient was placed in the observation unit for reevaluation for possible admission to the hospital     Patient had episode of SVT that was harder to break than typical.  Patient was resolved by the time she arrived in the ER but had probably some transient ischemia as evidenced by elevation troponin to 45 and then dropping again.  Patient had evaluation by cardiology and was taken to Cath Lab for further evaluation.  This was done due to some ST changes during the SVT episode.  These have since resolved as well.  Patient has had clean coronaries on cath.  Patient is being kept however due to elevation of creatinine.  Patient will have IV fluids overnight and recheck on renal function in the morning.  Patient currently comfortable.  Appreciate cardiology input       Boo Mai MD  Attending Emergency  Physician

## 2024-07-24 NOTE — PROGRESS NOTES
Fluids and Heparin not started as Dr. Perrin advised they were going to take patient now to Cath Lab so she was all set.

## 2024-07-24 NOTE — PLAN OF CARE
Problem: Discharge Planning  Goal: Discharge to home or other facility with appropriate resources  Outcome: Progressing  Flowsheets  Taken 7/24/2024 0107 by Malvin Weeks RN  Discharge to home or other facility with appropriate resources: Identify barriers to discharge with patient and caregiver  Taken 7/24/2024 0106 by Malvin Weeks, RN  Discharge to home or other facility with appropriate resources:   Identify barriers to discharge with patient and caregiver   Arrange for interpreters to assist at discharge as needed     Problem: Safety - Adult  Goal: Free from fall injury  Outcome: Progressing

## 2024-07-24 NOTE — CONSULTS
[facility]   - Cardiology Consult -               Today's Date: 7/24/2024  Patient Name: Rosmery Ramos  Date of admission: 7/23/2024  7:47 PM  Patient's age: 78 y.o., 1945  Admission Dx: Paroxysmal supraventricular tachycardia (HCC) [I47.10]  Elevated troponin [R79.89]    Requesting Physician: Boo Mai MD    Cardiac Evaluation Reason: SVT, elevated troponin 17 > 24    History Obtained From: patient and chart review     History of Present Illness:    The patient is a 78 y.o. year old female with a PMHx of HTN, HLD, paroxysmal Afib on Eliquis, SVT, pre-T2DM, CKD stage 3a, subclinical hyperthyroidism, breast cancer s/p lumpectomy and radiation, presenting with tachycardia that started suddenly while she was at work yesterday. She has a history of SVT and notes she has about 2-3 episodes of it every year. Typically it causes her to have vomit and have a bowel movement at the same time. Yesterday, she became nauseous and lightheaded but never vomited or had a bowel movement. No chest pain, palpitations, shortness of breath. Upon EMS arrival, she was found to be in SVT and was subsequently given 6mg followed by 12mg with return to NSR. She has remained in NSR since.     Past Medical History:   has a past medical history of Arthritis, Atrial fibrillation (HCC), Cancer (HCC), Hyperlipidemia, Hypertension, Lumbar stenosis, Multiple thyroid nodules, Nonsmoker, Prediabetes, Stage 3a chronic kidney disease (CKD) (HCC), Subclinical hyperthyroidism, SVT (supraventricular tachycardia) (HCC), and Vitamin D deficiency.    Past Surgical History:   has a past surgical history that includes Colonoscopy; Tibia fracture surgery (Right); eye surgery; arthroplasty (Left, 8/5/2020); Breast surgery; Knee arthroscopy (Right); and arthroplasty (Left, 6/17/2022).     Home Medications:    Prior to Admission medications    Medication Sig Start Date End Date Taking? Authorizing Provider   amLODIPine (NORVASC) 5 MG tablet TAKE  Basophils Absolute 0.04 0.00 - 0.20 k/uL    Immature Granulocytes Absolute <0.03 0.00 - 0.30 k/uL   Troponin    Collection Time: 24  8:04 PM   Result Value Ref Range    Troponin, High Sensitivity 17 (H) 0 - 14 ng/L   TSH with Reflex    Collection Time: 24  8:04 PM   Result Value Ref Range    TSH 0.93 0.27 - 4.20 uIU/mL   Troponin    Collection Time: 24 10:03 PM   Result Value Ref Range    Troponin, High Sensitivity 24 (H) 0 - 14 ng/L   Troponin    Collection Time: 24  6:11 AM   Result Value Ref Range    Troponin, High Sensitivity 46 (H) 0 - 14 ng/L     DATA:    EK24 - NSR at 65bpm. Normal axis. Prolonged QT/QTc (478/497).   24 - EKG via EMS showed SVT    Echo:   21  Left ventricle is normal in size with normal systolic function globally.  Calculated ejection fraction by Hlil's method is 61 %.  No significant pericardial effusion is seen.    Stress Test:   19  No stress-induced ischemia.  No infarct.  LVEF 47%.  Intermediate risk based on the LVEF    Cardiac Angiography:   None previous per chart review.    CXR:  24 - No acute cardiopulmonary process.      ASSESSMENT:  Supraventricular tachycardia, resolved after Adenosine 6mg and 12mg - patient reports approximately 2-3 episodes of SVT annually with no known triggers.  Troponin elevation 17 > 24, likely type II elevation 2/2 SVT. Normal stress test in 2019, normal echo with EF > 60% in .   Paroxysmal atrial fibrillation on Eliquis   CKD stage III  Subclinical hyperthyroidism  Hypertension  Hyperlipidemia  Pre-T2DM      RECOMMENDATIONS:  SVT is now resolved and there is no indication for further cardiac workup at this time. Patient is clear for discharge from a cardiology standpoint.   Continue home medications as prescribed.       Please wait for final attestation from rounding attending   PADMINI Esposito-S2  24 8:40 AM      I performed a history and physical examination of the patient and

## 2024-07-25 ENCOUNTER — TELEPHONE (OUTPATIENT)
Age: 79
End: 2024-07-25

## 2024-07-25 VITALS
HEIGHT: 71 IN | SYSTOLIC BLOOD PRESSURE: 140 MMHG | OXYGEN SATURATION: 96 % | HEART RATE: 56 BPM | RESPIRATION RATE: 20 BRPM | TEMPERATURE: 97.9 F | BODY MASS INDEX: 25.49 KG/M2 | WEIGHT: 182.1 LBS | DIASTOLIC BLOOD PRESSURE: 86 MMHG

## 2024-07-25 PROBLEM — I47.10 PAROXYSMAL SUPRAVENTRICULAR TACHYCARDIA (HCC): Status: RESOLVED | Noted: 2019-09-13 | Resolved: 2024-07-25

## 2024-07-25 PROBLEM — R79.89 ELEVATED TROPONIN: Status: RESOLVED | Noted: 2024-07-23 | Resolved: 2024-07-25

## 2024-07-25 LAB
ANION GAP SERPL CALCULATED.3IONS-SCNC: 9 MMOL/L (ref 9–16)
BUN SERPL-MCNC: 10 MG/DL (ref 8–23)
CALCIUM SERPL-MCNC: 8.9 MG/DL (ref 8.6–10.4)
CHLORIDE SERPL-SCNC: 109 MMOL/L (ref 98–107)
CO2 SERPL-SCNC: 22 MMOL/L (ref 20–31)
CREAT SERPL-MCNC: 0.8 MG/DL (ref 0.5–0.9)
EKG ATRIAL RATE: 0 BPM
EKG ATRIAL RATE: 65 BPM
EKG ATRIAL RATE: 88 BPM
EKG P AXIS: 63 DEGREES
EKG P AXIS: 64 DEGREES
EKG P-R INTERVAL: 164 MS
EKG P-R INTERVAL: 174 MS
EKG Q-T INTERVAL: 412 MS
EKG Q-T INTERVAL: 478 MS
EKG Q-T INTERVAL: 488 MS
EKG QRS DURATION: 100 MS
EKG QRS DURATION: 106 MS
EKG QRS DURATION: 90 MS
EKG QTC CALCULATION (BAZETT): 474 MS
EKG QTC CALCULATION (BAZETT): 497 MS
EKG QTC CALCULATION (BAZETT): 498 MS
EKG R AXIS: -3 DEGREES
EKG R AXIS: 16 DEGREES
EKG R AXIS: 17 DEGREES
EKG T AXIS: -3 DEGREES
EKG T AXIS: -7 DEGREES
EKG T AXIS: 39 DEGREES
EKG VENTRICULAR RATE: 57 BPM
EKG VENTRICULAR RATE: 65 BPM
EKG VENTRICULAR RATE: 88 BPM
GFR, ESTIMATED: 72 ML/MIN/1.73M2
GLUCOSE SERPL-MCNC: 85 MG/DL (ref 74–99)
POTASSIUM SERPL-SCNC: 4.3 MMOL/L (ref 3.7–5.3)
SODIUM SERPL-SCNC: 140 MMOL/L (ref 136–145)

## 2024-07-25 PROCEDURE — 6370000000 HC RX 637 (ALT 250 FOR IP): Performed by: STUDENT IN AN ORGANIZED HEALTH CARE EDUCATION/TRAINING PROGRAM

## 2024-07-25 PROCEDURE — 96361 HYDRATE IV INFUSION ADD-ON: CPT

## 2024-07-25 PROCEDURE — 80048 BASIC METABOLIC PNL TOTAL CA: CPT

## 2024-07-25 PROCEDURE — 93005 ELECTROCARDIOGRAM TRACING: CPT | Performed by: EMERGENCY MEDICINE

## 2024-07-25 PROCEDURE — 99233 SBSQ HOSP IP/OBS HIGH 50: CPT | Performed by: NURSE PRACTITIONER

## 2024-07-25 PROCEDURE — G0378 HOSPITAL OBSERVATION PER HR: HCPCS

## 2024-07-25 PROCEDURE — 2580000003 HC RX 258: Performed by: STUDENT IN AN ORGANIZED HEALTH CARE EDUCATION/TRAINING PROGRAM

## 2024-07-25 RX ADMIN — ANASTROZOLE 1 MG: 1 TABLET, COATED ORAL at 09:23

## 2024-07-25 RX ADMIN — ROSUVASTATIN CALCIUM 20 MG: 20 TABLET, FILM COATED ORAL at 09:25

## 2024-07-25 RX ADMIN — AMLODIPINE BESYLATE 10 MG: 10 TABLET ORAL at 09:23

## 2024-07-25 RX ADMIN — SODIUM CHLORIDE, PRESERVATIVE FREE 10 ML: 5 INJECTION INTRAVENOUS at 09:26

## 2024-07-25 RX ADMIN — APIXABAN 5 MG: 5 TABLET, FILM COATED ORAL at 09:25

## 2024-07-25 NOTE — PLAN OF CARE
Problem: Discharge Planning  Goal: Discharge to home or other facility with appropriate resources  Outcome: Progressing  Flowsheets (Taken 7/24/2024 1427 by Ana Elmore RN)  Discharge to home or other facility with appropriate resources: Identify barriers to discharge with patient and caregiver     Problem: Safety - Adult  Goal: Free from fall injury  Outcome: Progressing  Flowsheets (Taken 7/25/2024 6124)  Free From Fall Injury: Instruct family/caregiver on patient safety

## 2024-07-25 NOTE — PROGRESS NOTES
[facility]  Progress Note                   Date:   7/25/2024  Patient name: Rosmery Ramos  Date of admission:  7/23/2024  7:47 PM  MRN:   [id number]  YOB: 1945  PCP: Jade Heller MD    Reason for Admission: Paroxysmal supraventricular tachycardia (HCC) [I47.10]  Elevated troponin [R79.89]    Subjective:       Clinical Changes /Abnormalities: Seen & Examien din room. No acute CV issues/concerns overnight. Labs, vitals, tele, and cath reviewed as below.    Review of Systems    Medications:   Scheduled Meds:   amLODIPine  10 mg Oral Daily    anastrozole  1 mg Oral Daily    metoprolol succinate  50 mg Oral Daily    rosuvastatin  20 mg Oral Daily    apixaban  5 mg Oral BID    sodium chloride flush  5-40 mL IntraVENous 2 times per day     Continuous Infusions:   sodium chloride 100 mL/hr at 07/24/24 2038    sodium chloride       CBC:   Recent Labs     07/23/24 2004 07/24/24  1213   WBC 3.9 3.4*   HGB 11.1* 11.8*    197     BMP:    Recent Labs     07/23/24 2004      K 3.4*      CO2 19*   BUN 20   CREATININE 1.5*   GLUCOSE 103*     Hepatic:No results for input(s): \"AST\", \"ALT\", \"BILITOT\", \"ALKPHOS\" in the last 72 hours.    Invalid input(s): \"ALB\"  Troponin:   Recent Labs     07/23/24  2203 07/24/24  0611 07/24/24  1213   TROPHS 24* 46* 34*     BNP: No results for input(s): \"BNP\" in the last 72 hours.  Lipids: No results for input(s): \"CHOL\", \"HDL\" in the last 72 hours.    Invalid input(s): \"LDLCALCU\"  INR: No results for input(s): \"INR\" in the last 72 hours.    Objective:   Vitals: BP (!) 140/86   Pulse 57   Temp 97.9 °F (36.6 °C) (Oral)   Resp 20   Ht 1.803 m (5' 11\")   Wt 82.6 kg (182 lb 1.6 oz)   SpO2 96%   BMI 25.40 kg/m²   General appearance: alert and cooperative with exam  HEENT: Head: Normocephalic, no lesions, without obvious abnormality.  Neck:no JVD, trachea midline, no adenopathy  Lungs: Clear to auscultation  Heart: Regular rate and rhythm,  s1/s2 auscultated, no murmurs  Right radial post cath site CDI. Dressing removed and left open to air. Pulse +3  Abdomen: soft, non-tender, bowel sounds active  Extremities: no edema  Neurologic: not done    Echo:   6/11/21  Left ventricle is normal in size with normal systolic function globally.  Calculated ejection fraction by Hill's method is 61 %.  No significant pericardial effusion is seen.     Stress Test:   9/14/19  No stress-induced ischemia.  No infarct.  LVEF 47%.  Intermediate risk based on the LVEF    Cardiac Cath 7/24/24  Mild CAD.   Normal LV systolic function.   LVEDP 6 mm Hg.     Assessment / Acute Cardiac Problems:   SVT, recurrent. Aborted this admit after Adenosine  PAF on Eliquis  CKD3  Subclinical hyperthyroidism  HTN  HLP  Pre- DM2    Patient Active Problem List:     Paroxysmal supraventricular tachycardia (HCC)     Atrial fibrillation with tachycardic ventricular rate (HCC)     H/O supraventricular tachycardia     H/O malignant neoplasm of breast     Essential hypertension     Stage 3a chronic kidney disease (HCC)     Acquired hammertoe of left foot     Post-operative pain     Left foot pain     Ulcer of left foot, with fat layer exposed (HCC)     Subclinical hyperthyroidism     Multiple thyroid nodules     Vitamin D deficiency     Prediabetes     Lumbar stenosis     Elevated troponin      Plan of Treatment:   Stable. Cath as above with minimal CAD. Continue RF modifications  Continue PO BB. No further SVT. Consider OP EP evaluation  Continue PO Eliquis  Continue Norvasc  Repeat BMP and if stable will be OK for d/c from CV standpoint with OP f/u with primary cardiologist, Dr. Hernandez, in 2-3 weeks.     Electronically signed by Joya Chin, JEY - CNP on 7/25/2024 at 8:59 AM  [facility] [employer].  [phone]

## 2024-07-25 NOTE — DISCHARGE SUMMARY
CDU Discharge Summary        Patient:  Rosmery Ramos  YOB: 1945    MRN: [id number]   Acct: [id number]    Primary Care Physician: Jade Heller MD    Admit date:  7/23/2024  7:47 PM  Discharge date:  7/25/24    Discharge Diagnoses:     1.)  Patient had chest pain with sudden onset due to SVT.  Treated with cath lab.  Patient's symptoms are palpitations, diaphoresis, and SOB with the plan to follow up with outpatient cardiology.    Follow-up:  Call today/tomorrow for a follow up appointment with Jade Heller MD , or return to the Emergency Room with worsening symptoms    Stressed to patient the importance of following up with primary care doctor for further workup/management of symptoms.  Pt verbalizes understanding and agrees with plan.    Discharge Medication Changes:       Medication List        CONTINUE taking these medications      amLODIPine 5 MG tablet  Commonly known as: NORVASC  TAKE 2 TABLETS DAILY     anastrozole 1 MG tablet  Commonly known as: ARIMIDEX  Take 1 tablet by mouth daily     apixaban 5 MG Tabs tablet  Commonly known as: Eliquis  Take 1 tablet by mouth 2 times daily     diclofenac sodium 1 % Gel  Commonly known as: VOLTAREN  Apply 4 g topically 4 times daily     Handicap Placard Misc  by Does not apply route Temporary use- not to exceed more than 5 years  Patient unable to walk more than 200 feet without needing to stop to rest     metoprolol succinate 50 MG extended release tablet  Commonly known as: TOPROL XL  Take 1 tablet by mouth daily     nystatin 208578 UNIT/GM cream  Commonly known as: MYCOSTATIN  Apply topically 2 times daily.     rosuvastatin 20 MG tablet  Commonly known as: CRESTOR  Take 1 tablet by mouth daily            ASK your doctor about these medications      acetaminophen 500 MG tablet  Commonly known as: APAP Extra Strength  Take 2 tablets by mouth every 6 hours as needed for Pain               Where to Get Your Medications        These medications were

## 2024-07-25 NOTE — DISCHARGE INSTRUCTIONS
Your workup from the emergency department did not show any significant pathology but you were admitted to the observation unit for ongoing evaluation.    You saw the following specialties: Cardiology, Inpatient observation.     We no longer need to keep you in the hospital but that does not mean you are done being treated  -Take your prescribed medications as directed  -Follow-up with your primary care physician or the specialist designated or both as scheduled    Please return if your condition worsens or there are new symptoms    If there are concerns that have not been addressed while you have been in the hospital please let the discharge nurse know so the physician can be informed -it is easier to fix problems while you are still here    If you have questions after you have left the hospital please call the unit at  and ask for the observation resident on-call

## 2024-07-25 NOTE — PROGRESS NOTES
OBS/CDU   Progress NOTE      Patients PCP is:  Jade Heller MD        SUBJECTIVE      Patient is a 78-year-old female who presents with SVT and has a history of A-fib.  She complains of palpitations, diaphoresis, and shortness of breath.  Per EMS she was treated with adenosine, which did not terminate the rhythm.  She underwent cardiology evaluation, who recommended no further measures at this time.  Her troponins have been tracked since she arrived in the ED which have been uptrending.  Most recent troponin was 34, up from 46.  Chest x-ray from 7/23/2024 showed no acute cardiopulmonary process.    Cardiac cath performed 7/24/2024 showed mild coronary artery disease, normal left ventricular systolic function, as well as LVEDP 6 mm Hg. from a cardiology standpoint, patient okay to be discharged with recommendation of follow-up with outpatient cardiologist in 2 to 3 weeks.    PHYSICAL EXAM      General: NAD, AO X 3  Heent: EMOI, PERRL  Neck: SUPPLE, NO JVD  Cardiovascular: RRR, S1S2  Pulmonary: CTAB, NO SOB  Abdomen: SOFT, NTTP, ND, +BS  Extremities: +2/4 PULSES DISTAL, NO SWELLING  Neuro / Psych: NO NUMBNESS OR TINGLING, MENTATION AT BASELINE    PERTINENT TEST /EXAMS      Labs Reviewed   BASIC METABOLIC PANEL - Abnormal; Notable for the following components:       Result Value    Potassium 3.4 (*)     CO2 19 (*)     Glucose 103 (*)     Creatinine 1.5 (*)     Est, Glom Filt Rate 35 (*)     All other components within normal limits   CBC WITH AUTO DIFFERENTIAL - Abnormal; Notable for the following components:    RBC 3.83 (*)     Hemoglobin 11.1 (*)     Hematocrit 35.1 (*)     All other components within normal limits   TROPONIN - Abnormal; Notable for the following components:    Troponin, High Sensitivity 17 (*)     All other components within normal limits   TROPONIN - Abnormal; Notable for the following components:    Troponin, High Sensitivity 24 (*)     All other components within normal limits   TROPONIN -

## 2024-07-25 NOTE — PROGRESS NOTES
Discharge instructions reviewed with pt, verbalized understanding. No further needs.Electronically signed by Isis Parks RN on 7/25/2024 at 1:46 PM

## 2024-07-25 NOTE — PROGRESS NOTES
[facility]  CDU / OBSERVATION eNCOUnter  Attending NOte       I performed a history and physical examination of the patient and discussed management with the resident.  This patient was placed in the observation unit for reevaluation for possible admission to the hospital. I reviewed the resident’s note and agree with the documented findings and plan of care. Any areas of disagreement are noted on the chart. I was personally present for the key portions of any procedures. I have documented in the chart those procedures where I was not present during the key portions. I have reviewed the nurses notes. I agree with the chief complaint, past medical history, past surgical history, allergies, medications, social and family history as documented unless otherwise noted below.    The patient was placed in the observation unit for reevaluation for possible admission to the hospital.      The Family history, social history, and ROS are effectively unchanged since admission unless noted elsewhere in the chart.    78-year-old female with past medical history of atrial fibrillation, hypertension, SVT presented after an episode of SVT that occurred earlier the afternoon of her admission.  Patient's initial troponin was 17, and then went up to 24.  Therefore, she was admitted for cardiology evaluation.  Patient underwent a cardiac cath yesterday that showed minimal coronary artery disease.  There was some concern about her creatinine being elevated to 1.5, but the repeat is 0.8 this morning.  Plan for discharge home.    Amrita Juan MD  Attending Emergency  Physician

## 2024-07-26 NOTE — TELEPHONE ENCOUNTER
Left message for patient to call back for TCM call.  Will attempt again later today since this is the second business day since discharge.

## 2024-07-26 NOTE — TELEPHONE ENCOUNTER
Care Transitions Initial Follow Up Call    Outreach made within 2 business days of discharge: Yes    Patient: Rosmery Ramos Patient : 1945   MRN: [id number]  Reason for Admission: palpitations, SVTs  Discharge Date: 24       Spoke with: Patient    Discharge department/facility: [facility] Interactive Patient Contact:  Was patient able to fill all prescriptions: Yes  Was patient instructed to bring all medications to the follow-up visit: Yes  Is patient taking all medications as directed in the discharge summary? Did not review medications as patient was not home.  No new meds were started during hospitalization.  Does patient understand their discharge instructions: Yes  Does patient have questions or concerns that need addressed prior to 7-14 day follow up office visit: yes - patient is interested in copies of what she actually had done at the hospital and more clarity on the heart rhythms.  She is unsure exactly what was discovered.    Additional needs identified to be addressed with provider  Standard priority: Patient did not yet make an appointment with endo and would need a new referral.              Admission date: 24  Follow-up with specialists:  Cardiologoy  Follow-up labs or imaging: Not Applicable  Home Health: no  New Medications:  No medication changes    For Pharmacy Admin Tracking Only    Program: Medical Group  CPA in place:  No  Recommendation Provided To: Provider: 1 via Note to Provider  Intervention Detail: Referral to Other Provider  Intervention Accepted By: Provider: 1  Gap Closed?: No   Time Spent (min): 10     Scheduled appointment with PCP within 7-14 days    Follow Up  Future Appointments   Date Time Provider Department Center   2024  1:20 PM Jade Heller MD [facility]   9/10/2024  8:00 AM Cyrus Hutson DPM [facility]       HEATHER VAIL, [facility]

## 2024-07-30 ENCOUNTER — OFFICE VISIT (OUTPATIENT)
Age: 79
End: 2024-07-30

## 2024-07-30 VITALS
RESPIRATION RATE: 16 BRPM | HEART RATE: 60 BPM | SYSTOLIC BLOOD PRESSURE: 135 MMHG | BODY MASS INDEX: 25.03 KG/M2 | TEMPERATURE: 97.4 F | DIASTOLIC BLOOD PRESSURE: 68 MMHG | HEIGHT: 71 IN | WEIGHT: 178.8 LBS

## 2024-07-30 DIAGNOSIS — I10 PRIMARY HYPERTENSION: ICD-10-CM

## 2024-07-30 DIAGNOSIS — E05.90 SUBCLINICAL HYPERTHYROIDISM: ICD-10-CM

## 2024-07-30 DIAGNOSIS — H61.23 BILATERAL IMPACTED CERUMEN: ICD-10-CM

## 2024-07-30 DIAGNOSIS — N18.32 STAGE 3B CHRONIC KIDNEY DISEASE (HCC): ICD-10-CM

## 2024-07-30 DIAGNOSIS — Z09 HOSPITAL DISCHARGE FOLLOW-UP: ICD-10-CM

## 2024-07-30 DIAGNOSIS — I47.10 SVT (SUPRAVENTRICULAR TACHYCARDIA) (HCC): Primary | ICD-10-CM

## 2024-07-30 DIAGNOSIS — I48.0 PAROXYSMAL A-FIB (HCC): ICD-10-CM

## 2024-07-30 DIAGNOSIS — R73.03 PREDIABETES: ICD-10-CM

## 2024-07-30 DIAGNOSIS — D64.9 NORMOCYTIC ANEMIA: ICD-10-CM

## 2024-07-30 DIAGNOSIS — Z86.018 HISTORY OF UTERINE FIBROID: ICD-10-CM

## 2024-07-30 RX ORDER — SENNOSIDES 8.6 MG
650 CAPSULE ORAL EVERY 8 HOURS PRN
COMMUNITY

## 2024-07-30 RX ORDER — PHENOL 1.4 %
1 AEROSOL, SPRAY (ML) MUCOUS MEMBRANE DAILY
COMMUNITY

## 2024-07-30 SDOH — ECONOMIC STABILITY: FOOD INSECURITY: WITHIN THE PAST 12 MONTHS, YOU WORRIED THAT YOUR FOOD WOULD RUN OUT BEFORE YOU GOT MONEY TO BUY MORE.: NEVER TRUE

## 2024-07-30 SDOH — ECONOMIC STABILITY: INCOME INSECURITY: HOW HARD IS IT FOR YOU TO PAY FOR THE VERY BASICS LIKE FOOD, HOUSING, MEDICAL CARE, AND HEATING?: SOMEWHAT HARD

## 2024-07-30 SDOH — ECONOMIC STABILITY: FOOD INSECURITY: WITHIN THE PAST 12 MONTHS, THE FOOD YOU BOUGHT JUST DIDN'T LAST AND YOU DIDN'T HAVE MONEY TO GET MORE.: NEVER TRUE

## 2024-07-30 SDOH — ECONOMIC STABILITY: HOUSING INSECURITY
IN THE LAST 12 MONTHS, WAS THERE A TIME WHEN YOU DID NOT HAVE A STEADY PLACE TO SLEEP OR SLEPT IN A SHELTER (INCLUDING NOW)?: NO

## 2024-07-30 ASSESSMENT — PATIENT HEALTH QUESTIONNAIRE - PHQ9
SUM OF ALL RESPONSES TO PHQ QUESTIONS 1-9: 0
SUM OF ALL RESPONSES TO PHQ QUESTIONS 1-9: 0
2. FEELING DOWN, DEPRESSED OR HOPELESS: NOT AT ALL
SUM OF ALL RESPONSES TO PHQ9 QUESTIONS 1 & 2: 0
SUM OF ALL RESPONSES TO PHQ QUESTIONS 1-9: 0
SUM OF ALL RESPONSES TO PHQ QUESTIONS 1-9: 0
1. LITTLE INTEREST OR PLEASURE IN DOING THINGS: NOT AT ALL

## 2024-07-30 NOTE — PATIENT INSTRUCTIONS
- Referral to OBGYN  - Referral to Dr Figueroa electrophysiology  - Follow up with Dr Hernandez as recommended. Please make an appointment  - I will see you for follow up as scheduled. Go to your MAW  - We will do further testing for your thyroid at follow up for chronic conditions.   - Schedule for ear wax removal. Use Debrox Carbamide peroxide Instill 5 to 10 drops twice daily up to 4 days.

## 2024-07-30 NOTE — PROGRESS NOTES
Patient was admitted to [facility] from 2024 to 2024 for SVT.  Patient presented to hospital with palpitations, dizziness, vomiting and was found to be in SVT.  Patient was treated in the Cath Lab.  Her symptoms are palpitations, diaphoresis, shortness of breath at the time of admission.  She was admitted for observation and cardiac catheterization.  There was plans to follow-up with outpatient cardiology. There is no changes to medications during the hospital stay.    -Amlodipine 5 twice daily, anastrozole 1 mg daily, Eliquis 5 mg 2 times daily, metoprolol succinate 50 extended release daily, rosuvastatin 20 mg daily    -During her hospital stay her labs did show potassium was 3.4, creatinine was 1.5 with a GFR of 35, hemoglobin was 11.1, troponin was 17 and 24 and 46.  Repeat creatinine prior to discharge was 0.8  -Cardiac catheterization performed on 2024 showed mild CAD, normal left ventricular systolic function, LVEDP 6 mmHg.  -They wanted her to follow-up with outpatient cardiology in 2 to 3 weeks. She is going to Dr Hernandez, stopped going to [facility] because the copay was too expensive.    Initial contact date:       Inpatient course: Discharge summary reviewed- see above.  She was recommended to continue her oral beta-blocker, oral Eliquis, Norvasc.  They recommended outpatient EP evaluation    Current status: Patient feels well.  Denies any symptoms.  Denies any chest pain or shortness of breath.  Denies any palpitations.  She had many questions regarding her hospital stay and the procedure that was done.  She has not made an appointment with cardiology yet.  Discussed referral to EP as she has had multiple episodes of SVT.  Her TSH in the hospital was normal.  She never followed up for subclinical hyperthyroidism as a year ago she was given a referral to endocrinology and workup was ordered.  When she called back the other day to get labs  as it had been over a

## 2024-08-08 ENCOUNTER — OFFICE VISIT (OUTPATIENT)
Age: 79
End: 2024-08-08
Payer: COMMERCIAL

## 2024-08-08 VITALS
WEIGHT: 180.6 LBS | BODY MASS INDEX: 25.19 KG/M2 | DIASTOLIC BLOOD PRESSURE: 66 MMHG | HEART RATE: 72 BPM | SYSTOLIC BLOOD PRESSURE: 116 MMHG | TEMPERATURE: 97.5 F | RESPIRATION RATE: 18 BRPM

## 2024-08-08 DIAGNOSIS — H61.23 BILATERAL IMPACTED CERUMEN: Primary | ICD-10-CM

## 2024-08-08 PROCEDURE — 99213 OFFICE O/P EST LOW 20 MIN: CPT | Performed by: STUDENT IN AN ORGANIZED HEALTH CARE EDUCATION/TRAINING PROGRAM

## 2024-08-08 PROCEDURE — 1123F ACP DISCUSS/DSCN MKR DOCD: CPT | Performed by: STUDENT IN AN ORGANIZED HEALTH CARE EDUCATION/TRAINING PROGRAM

## 2024-08-08 PROCEDURE — 69210 REMOVE IMPACTED EAR WAX UNI: CPT | Performed by: STUDENT IN AN ORGANIZED HEALTH CARE EDUCATION/TRAINING PROGRAM

## 2024-08-08 PROCEDURE — 3074F SYST BP LT 130 MM HG: CPT | Performed by: STUDENT IN AN ORGANIZED HEALTH CARE EDUCATION/TRAINING PROGRAM

## 2024-08-08 PROCEDURE — 3078F DIAST BP <80 MM HG: CPT | Performed by: STUDENT IN AN ORGANIZED HEALTH CARE EDUCATION/TRAINING PROGRAM

## 2024-08-08 NOTE — PROGRESS NOTES
Procedure Documentation:  Procedure:     Ear Irrigation/instrumented removal of cerumen   Location:       bilateral Ear Canals  Reason:         Impacted Wax/Cerumen  Note:             Patients ear(s) were irrigated with warm tap water and peroxide using Waterpik. Manual removal with cerumen curette  was done by provider.   I was able to remove the wax/cerumen completely from right ear; partially from left ear.   Patient tolerated the procedure well.  Pt instructions: continue current home treatments.  
(7/30/2024)    Overall Financial Resource Strain (CARDIA)     Difficulty of Paying Living Expenses: Somewhat hard   Food Insecurity: No Food Insecurity (7/30/2024)    Hunger Vital Sign     Worried About Running Out of Food in the Last Year: Never true     Ran Out of Food in the Last Year: Never true   Transportation Needs: No Transportation Needs (7/30/2024)    PRAPARE - Transportation     Lack of Transportation (Medical): No     Lack of Transportation (Non-Medical): No   Housing Stability: Low Risk  (7/30/2024)    Housing Stability Vital Sign     Unable to Pay for Housing in the Last Year: No     Number of Places Lived in the Last Year: 1     Unstable Housing in the Last Year: No        PHYSICAL EXAM   /66 (Site: Left Upper Arm, Position: Sitting)   Pulse 72   Temp 97.5 °F (36.4 °C) (Oral)   Resp 18   Wt 81.9 kg (180 lb 9.6 oz)   BMI 25.19 kg/m²    Physical Exam  Vitals and nursing note reviewed.   Constitutional:       General: She is not in acute distress.     Appearance: Normal appearance. She is not ill-appearing.   HENT:      Head: Normocephalic and atraumatic.      Right Ear: External ear normal.      Left Ear: External ear normal.      Nose: Nose normal.      Mouth/Throat:      Mouth: Mucous membranes are moist.   Eyes:      Extraocular Movements: Extraocular movements intact.      Conjunctiva/sclera: Conjunctivae normal.   Cardiovascular:      Rate and Rhythm: Normal rate and regular rhythm.      Pulses: Normal pulses.      Heart sounds: Normal heart sounds. No murmur heard.     No friction rub. No gallop.   Pulmonary:      Effort: Pulmonary effort is normal. No respiratory distress.      Breath sounds: Normal breath sounds. No wheezing.   Abdominal:      General: Bowel sounds are normal. There is no distension.      Tenderness: There is no abdominal tenderness. There is no guarding.   Musculoskeletal:         General: No swelling.      Cervical back: Normal range of motion and neck supple.

## 2024-08-11 NOTE — PROGRESS NOTES
Medicare Service Recommended Frequency Last Done Due next   Pneumonia vaccine After 65, Prevnar 20 ONCE You received the Pneumovax 23 and 10/12/2022 and the Prevnar 13 on 1/9/2020 You could get the Prevnar 20 after 10/12/2027   Influenza vaccine Yearly 11/3/2023 Thanks for getting your yearly flu shot   Zoster/Shingles Shingrix vaccine:  2 shots 2-6 months apart  You got your shingles on 3/14/2024 and 11/3/2023 You are up-to-date   COVID Variable You had a number of COVID vaccines including the booster on 3/14/2024 You are up-to-date    Tetanus vaccine (Td or Tdap) Every 10 years  12/15/2014 You will be due this December.  Please consider getting it at the pharmacy   RSV vaccine One injection You had the RSV vaccine on 11/3/2023 You are up-to-date   Mammogram Every 1-2 years (ages ~40-75) You had a mammogram on 7/10/24 You should continue to follow with Dr. Bloom   Colorectal Cancer Screening FIT test yearly, Cologuard every 3 years, Colonoscopy every 10 years *unless otherwise indicated* It looks like you had one in 2017 You are past the age where routine colon cancer screenings are recommended.   Cardiovascular/cholesterol screening As determined by your physician 2019  Total cholesterol: 165  Triglycerides: 58  HDL (good cholesterol): 47  LDL (bad cholesterol): 106 Dr. Heller will reorder the labs for you   Diabetes screening   As determined by your physician 7/25/24  A1c/glucose: 85 Dr. Heller will order the labs for you   Osteoporosis screening   DEXA every 2 years for females (ages 65-85) You had a DEXA on 7/10/24 which showed osteopenia Continue to follow with Dr. Bloom for this but these are typically done every 2 years.   Screening for abdominal aortic aneurysm One-time screening in patients if you have a family history of abdominal aortic aneurysms, or you're a man 65-75 and have smoked at least 100 cigarettes in your lifetime You denied any family history so you would not qualify.    Glaucoma screening

## 2024-08-12 ENCOUNTER — OFFICE VISIT (OUTPATIENT)
Age: 79
End: 2024-08-12
Payer: COMMERCIAL

## 2024-08-12 VITALS
HEIGHT: 72 IN | DIASTOLIC BLOOD PRESSURE: 67 MMHG | WEIGHT: 177 LBS | TEMPERATURE: 97.8 F | SYSTOLIC BLOOD PRESSURE: 117 MMHG | RESPIRATION RATE: 20 BRPM | HEART RATE: 63 BPM | BODY MASS INDEX: 23.98 KG/M2

## 2024-08-12 DIAGNOSIS — Z00.00 MEDICARE ANNUAL WELLNESS VISIT, SUBSEQUENT: Primary | ICD-10-CM

## 2024-08-12 DIAGNOSIS — Z00.00 WELCOME TO MEDICARE PREVENTIVE VISIT: ICD-10-CM

## 2024-08-12 PROCEDURE — 1123F ACP DISCUSS/DSCN MKR DOCD: CPT | Performed by: PHARMACIST

## 2024-08-12 PROCEDURE — G0439 PPPS, SUBSEQ VISIT: HCPCS | Performed by: PHARMACIST

## 2024-08-12 PROCEDURE — 3074F SYST BP LT 130 MM HG: CPT | Performed by: PHARMACIST

## 2024-08-12 PROCEDURE — 3078F DIAST BP <80 MM HG: CPT | Performed by: PHARMACIST

## 2024-08-12 RX ORDER — ALENDRONATE SODIUM 70 MG/1
70 TABLET ORAL
COMMUNITY

## 2024-08-12 SDOH — HEALTH STABILITY: PHYSICAL HEALTH: ON AVERAGE, HOW MANY MINUTES DO YOU ENGAGE IN EXERCISE AT THIS LEVEL?: 0 MIN

## 2024-08-12 SDOH — HEALTH STABILITY: PHYSICAL HEALTH: ON AVERAGE, HOW MANY DAYS PER WEEK DO YOU ENGAGE IN MODERATE TO STRENUOUS EXERCISE (LIKE A BRISK WALK)?: 0 DAYS

## 2024-08-12 ASSESSMENT — PATIENT HEALTH QUESTIONNAIRE - PHQ9
2. FEELING DOWN, DEPRESSED OR HOPELESS: NOT AT ALL
1. LITTLE INTEREST OR PLEASURE IN DOING THINGS: NOT AT ALL
SUM OF ALL RESPONSES TO PHQ QUESTIONS 1-9: 0
SUM OF ALL RESPONSES TO PHQ9 QUESTIONS 1 & 2: 0

## 2024-08-12 ASSESSMENT — LIFESTYLE VARIABLES
HOW OFTEN DO YOU HAVE A DRINK CONTAINING ALCOHOL: NEVER
HOW OFTEN DO YOU HAVE SIX OR MORE DRINKS ON ONE OCCASION: 1
HOW MANY STANDARD DRINKS CONTAINING ALCOHOL DO YOU HAVE ON A TYPICAL DAY: 0
HOW OFTEN DO YOU HAVE A DRINK CONTAINING ALCOHOL: 1
HOW MANY STANDARD DRINKS CONTAINING ALCOHOL DO YOU HAVE ON A TYPICAL DAY: PATIENT DOES NOT DRINK

## 2024-08-12 ASSESSMENT — ENCOUNTER SYMPTOMS
COUGH: 0
VOMITING: 0
SHORTNESS OF BREATH: 0
ABDOMINAL PAIN: 0
NAUSEA: 0

## 2024-08-12 NOTE — PATIENT INSTRUCTIONS
Thank you for coming in today and allowing me to be part of your care team.    Please call your insurance to find out what dentist will be covered for you.   If you have any questions regarding your advanced directives, please call our  [facility].     If you have any questions or concerns, please always feel free to call the office at [phone] and ask for Isa the Pharmacist.        Medicare Service Recommended Frequency Last Done Due next   Pneumonia vaccine After 65, Prevnar 20 ONCE You received the Pneumovax 23 and 10/12/2022 and the Prevnar 13 on 1/9/2020 You could get the Prevnar 20 after 10/12/2027   Influenza vaccine Yearly 11/3/2023 Thanks for getting your yearly flu shot   Zoster/Shingles Shingrix vaccine:  2 shots 2-6 months apart  You got your shingles on 3/14/2024 and 11/3/2023 You are up-to-date   COVID Variable You had a number of COVID vaccines including the booster on 3/14/2024 You are up-to-date    Tetanus vaccine (Td or Tdap) Every 10 years  12/15/2014 You will be due this December.  Please consider getting it at the pharmacy   RSV vaccine One injection You had the RSV vaccine on 11/3/2023 You are up-to-date   Mammogram Every 1-2 years (ages ~40-75) You had a mammogram on 7/10/24 You should continue to follow with Dr. Bloom   Colorectal Cancer Screening FIT test yearly, Cologuard every 3 years, Colonoscopy every 10 years *unless otherwise indicated* It looks like you had one in 2017 You are past the age where routine colon cancer screenings are recommended.   Cardiovascular/cholesterol screening As determined by your physician 2019  Total cholesterol: 165  Triglycerides: 58  HDL (good cholesterol): 47  LDL (bad cholesterol): 106 Dr. Heller will reorder the labs for you   Diabetes screening   As determined by your physician 7/25/24  A1c/glucose: 85 Dr. Heller will order the labs for you   Osteoporosis screening   DEXA every 2 years for females (ages 65-85) You had a DEXA on 7/10/24 which

## 2024-08-14 ENCOUNTER — OFFICE VISIT (OUTPATIENT)
Age: 79
End: 2024-08-14
Payer: COMMERCIAL

## 2024-08-14 VITALS
DIASTOLIC BLOOD PRESSURE: 67 MMHG | SYSTOLIC BLOOD PRESSURE: 116 MMHG | WEIGHT: 178.8 LBS | RESPIRATION RATE: 16 BRPM | HEIGHT: 72 IN | BODY MASS INDEX: 24.22 KG/M2 | TEMPERATURE: 97.7 F | HEART RATE: 65 BPM

## 2024-08-14 DIAGNOSIS — H53.40 VISUAL FIELD DEFECT: Primary | ICD-10-CM

## 2024-08-14 DIAGNOSIS — Z98.49 HISTORY OF CATARACT EXTRACTION, UNSPECIFIED LATERALITY: ICD-10-CM

## 2024-08-14 DIAGNOSIS — R42 DIZZINESS: ICD-10-CM

## 2024-08-14 DIAGNOSIS — G44.89 OTHER HEADACHE SYNDROME: ICD-10-CM

## 2024-08-14 PROCEDURE — 3078F DIAST BP <80 MM HG: CPT | Performed by: STUDENT IN AN ORGANIZED HEALTH CARE EDUCATION/TRAINING PROGRAM

## 2024-08-14 PROCEDURE — 3074F SYST BP LT 130 MM HG: CPT | Performed by: STUDENT IN AN ORGANIZED HEALTH CARE EDUCATION/TRAINING PROGRAM

## 2024-08-14 PROCEDURE — 99212 OFFICE O/P EST SF 10 MIN: CPT | Performed by: STUDENT IN AN ORGANIZED HEALTH CARE EDUCATION/TRAINING PROGRAM

## 2024-08-14 PROCEDURE — 1123F ACP DISCUSS/DSCN MKR DOCD: CPT | Performed by: STUDENT IN AN ORGANIZED HEALTH CARE EDUCATION/TRAINING PROGRAM

## 2024-08-14 PROCEDURE — 99173 VISUAL ACUITY SCREEN: CPT | Performed by: STUDENT IN AN ORGANIZED HEALTH CARE EDUCATION/TRAINING PROGRAM

## 2024-08-14 PROCEDURE — 99214 OFFICE O/P EST MOD 30 MIN: CPT | Performed by: STUDENT IN AN ORGANIZED HEALTH CARE EDUCATION/TRAINING PROGRAM

## 2024-08-14 NOTE — PATIENT INSTRUCTIONS
I ordered an MRI, usually would order this STAT but you are going to [place] and declined moving your trip or getting this workup.   Please go to the ER as recommended if any symptoms  Discussed getting in with your eye doctor asap, I will contact Dr Wilkes about getting you in   Please go to an eye doctor and get checked out either here or in [place]  We discussed risks and I know you said your symptoms resolved  Discussed ddx including concern for intraocular stroke, CRVO, intracranial process, other acute eye process.

## 2024-08-14 NOTE — PROGRESS NOTES
duplicate  
here or in [place]    Discussed ddx including concern for intraocular stroke, CRVO, intracranial process, other acute eye process, gca  (however no constitutional sx/temporal tenderness/no hx pmr, patient declines labs). We discussed the possibility of blindness if she does not get checked out.    Patient reports all her symptoms are resolved and she cannot move her trip to [place]. We discussed risks of not getting checked out.    I will have the nurses check that she safely made to [place] but also would recommend that she go to the ER as we could not do imaging or labs.  -See telephone encounter. At the end of the visit, patient thought this visit was for cerumen irrigation, told her she had another appointment already scheduled.     - Questions/concerns answered. Patient verbalized and expressed understanding. Medications, laboratory testing, imaging, consultation, and follow up as documented in this encounter.     Please be aware portions of this note were completed using voice recognition software and unforeseen errors may have occurred    Electronically signed by Jade Heller MD

## 2024-08-15 ENCOUNTER — TELEPHONE (OUTPATIENT)
Age: 79
End: 2024-08-15

## 2024-08-16 NOTE — TELEPHONE ENCOUNTER
Patient returned call and states she went to [facility] in [place], [place] and she told them her sx and they told her that she either had ocular or ophthalmic migraines.  I asked patient if they actually did an physical eye exam and she states \"No, I just told them my sx and that is what they told her.  I asked pateint again if they did a physical eye exam and she again stated \"no.\"  I explained the improtance of having a physical eye exam to see what is wrong with her eye and she stated \"they just went by my sx.\"  Patient instructed that they should have completed an eye exam to see what was happening with her.  Patient states \"I'll just take care of it when I get home on Monday.\"  I will try to call and see I I can get an actual report of what occurred at the visit.  Instructed patient  she needed to go to ER for evaluation, patient verb understanding.  Dr Heller notified and still recommends patient be checked out by an ophthalmologist/er  
Rosmery called back this morning.  States she did not go to ED yesterday.  States she will go today.  Asked her to call office after she has went to ED.  
Spoke with Patient advised she needed to go to ER to be evaluated in [place], she said she just got in and she would call us back tomorrow. I did explain the importance of her going to be evaluated.  
Staff state that patient said \"she will go when she's ready or sometime later.\"  
Jessica spoke about the importance of getting checked out Rosmery said \"I just got here\" and also said \"I will call you guys back tomorrow\" and hung up.

## 2024-08-19 RX ORDER — ANASTROZOLE 1 MG/1
1 TABLET ORAL DAILY
Qty: 90 TABLET | Refills: 1 | Status: SHIPPED | OUTPATIENT
Start: 2024-08-19 | End: 2024-10-18 | Stop reason: SDUPTHER

## 2024-08-21 ENCOUNTER — HOSPITAL ENCOUNTER (OUTPATIENT)
Dept: MRI IMAGING | Age: 79
Discharge: HOME | End: 2024-08-23
Attending: STUDENT IN AN ORGANIZED HEALTH CARE EDUCATION/TRAINING PROGRAM
Payer: COMMERCIAL

## 2024-08-21 DIAGNOSIS — G44.89 OTHER HEADACHE SYNDROME: ICD-10-CM

## 2024-08-21 DIAGNOSIS — H53.40 VISUAL FIELD DEFECT: ICD-10-CM

## 2024-08-21 DIAGNOSIS — R42 DIZZINESS: ICD-10-CM

## 2024-08-21 PROCEDURE — 70553 MRI BRAIN STEM W/O & W/DYE: CPT

## 2024-08-21 PROCEDURE — 2580000003 HC RX 258: Performed by: STUDENT IN AN ORGANIZED HEALTH CARE EDUCATION/TRAINING PROGRAM

## 2024-08-21 PROCEDURE — A9579 GAD-BASE MR CONTRAST NOS,1ML: HCPCS | Performed by: STUDENT IN AN ORGANIZED HEALTH CARE EDUCATION/TRAINING PROGRAM

## 2024-08-21 PROCEDURE — 6360000004 HC RX CONTRAST MEDICATION: Performed by: STUDENT IN AN ORGANIZED HEALTH CARE EDUCATION/TRAINING PROGRAM

## 2024-08-21 RX ORDER — SODIUM CHLORIDE 0.9 % (FLUSH) 0.9 %
10 SYRINGE (ML) INJECTION PRN
Status: DISCONTINUED | OUTPATIENT
Start: 2024-08-21 | End: 2024-08-24 | Stop reason: HOSPADM

## 2024-08-21 RX ADMIN — SODIUM CHLORIDE, PRESERVATIVE FREE 10 ML: 5 INJECTION INTRAVENOUS at 12:11

## 2024-08-21 RX ADMIN — GADOTERIDOL 16 ML: 279.3 INJECTION, SOLUTION INTRAVENOUS at 12:10

## 2024-08-21 ASSESSMENT — ENCOUNTER SYMPTOMS
ABDOMINAL PAIN: 0
COUGH: 0
SHORTNESS OF BREATH: 0
NAUSEA: 0
VOMITING: 0

## 2024-08-22 ENCOUNTER — TELEPHONE (OUTPATIENT)
Age: 79
End: 2024-08-22

## 2024-08-22 DIAGNOSIS — G44.89 OTHER HEADACHE SYNDROME: ICD-10-CM

## 2024-08-22 DIAGNOSIS — R42 DIZZINESS: ICD-10-CM

## 2024-08-22 DIAGNOSIS — H53.40 VISUAL FIELD DEFECT: Primary | ICD-10-CM

## 2024-08-22 NOTE — TELEPHONE ENCOUNTER
Patient missed her appointment with me today, no show. Please schedule her for tomorrow or asap if she can come in. Mri pending still. She needs to be seen.

## 2024-08-23 NOTE — TELEPHONE ENCOUNTER
Clinical staff can you let her know that  - MRI showed nonspecific changes in depp and white matter and flair hyperintensity posteriorly.  Finding is nonspecific, but is most consistent with severe chronic small vessel ischemic change. She again needs to come in today or early next week to discuss MRI, her headache and eye changes and optimize her medical care and medications.    Since I will be out until Thursday - I want her to be seen before Thursday by a senior. She no showed me yesterday    For whoever sees her:  - Severe chronic small vessel ischemic changes: consider asa, already on statin continue crestor 20 mg , referral to neurology if warranted  - recently had sudden onset unilateral visual changes with headache and dizziness that resolved. She declined any workup or going to er as she was going to [place]. See staff prior notes and documentation. If she comes back in would do an ESR/CRP to rule out GCA although no temporal tenderness or jaw tenderness, no constitutional symptoms.  -I paged Dr Wilkes ophthalmology last week who agreed with patient going to er (pt declined and went to [place], unsure if she got evaluated there) he recommended urgent er eval + ophthalmology eval. He would be happy to see her in office.

## 2024-08-26 ENCOUNTER — TELEPHONE (OUTPATIENT)
Age: 79
End: 2024-08-26

## 2024-08-26 NOTE — TELEPHONE ENCOUNTER
Please call patient and advise her to call her insurance to see which ophthalmologist is covered. She needs to be evaluated urgently.  Dr. Wilkes from [facility] unfortunately does not accept the patient's insurance.    Please also provide patient with a list of ophthalmologist in the area.  When she finds out which one the insurance covers she can let us know so we can provide a referral.    She missed her last appointment with Dr. Heller.

## 2024-08-26 NOTE — TELEPHONE ENCOUNTER
I called patient and she states she went to Urgent care on [address] and was Dx with the Flu . Patient states they tested her for covid but it was negative.  Patient states sx started about 1 week ago with sore throat, not sure if she had a fever.  Patient states she is fatigue, resting and drinking plenty of fluids.  Patient denies HA today or visual disturbances.  Patient states she only had visual disturbances once and that is when she came in last to see us and has not had any issues since other than getting the Flu.  I reviewed the message from Dr Heller and she verb understanding and states she has an upcoming appt with  but did not know when it was.  I told patient I would call for ER report and I will call to see when her appt is with Dr. Wilkes and call her back today as to when she should be seen in office. Patient verb understanding.

## 2024-08-27 NOTE — TELEPHONE ENCOUNTER
I called Dr Nichols office referral along with MRI and office note faxed to them.  They will review and call patient for appt.

## 2024-08-27 NOTE — TELEPHONE ENCOUNTER
Patient needs to be seen by a physician in our office asap. I am on inpatient starting Thursday.   She does not need to wait for me to come before then

## 2024-08-27 NOTE — TELEPHONE ENCOUNTER
I called patient back and she states they scheduled her tfor tomorrow but Dr not in so they rescheduled her for Sept 5 in the morning.  Patient was driving so I will call her later today or in the morning to schedule f/u with Dr. Heller after she sees Dr Garcia, optho

## 2024-08-27 NOTE — TELEPHONE ENCOUNTER
I [employer] and they directed me to their website. Dr Levar robles is on their list. I will call his office to make sure they take her insurance this morning.

## 2024-08-28 ENCOUNTER — OFFICE VISIT (OUTPATIENT)
Age: 79
End: 2024-08-28
Payer: COMMERCIAL

## 2024-08-28 VITALS
HEART RATE: 84 BPM | DIASTOLIC BLOOD PRESSURE: 68 MMHG | WEIGHT: 175.6 LBS | RESPIRATION RATE: 16 BRPM | HEIGHT: 72 IN | BODY MASS INDEX: 23.78 KG/M2 | SYSTOLIC BLOOD PRESSURE: 113 MMHG | TEMPERATURE: 97.9 F

## 2024-08-28 DIAGNOSIS — I67.2 ARTERIOSCLEROTIC CEREBROVASCULAR DISEASE: Primary | ICD-10-CM

## 2024-08-28 DIAGNOSIS — H61.22 IMPACTED CERUMEN OF LEFT EAR: ICD-10-CM

## 2024-08-28 DIAGNOSIS — H53.9 VISUAL DISTURBANCES: ICD-10-CM

## 2024-08-28 DIAGNOSIS — I10 ESSENTIAL HYPERTENSION: ICD-10-CM

## 2024-08-28 PROCEDURE — 1123F ACP DISCUSS/DSCN MKR DOCD: CPT | Performed by: FAMILY MEDICINE

## 2024-08-28 PROCEDURE — 3078F DIAST BP <80 MM HG: CPT | Performed by: FAMILY MEDICINE

## 2024-08-28 PROCEDURE — 99212 OFFICE O/P EST SF 10 MIN: CPT | Performed by: FAMILY MEDICINE

## 2024-08-28 PROCEDURE — 3074F SYST BP LT 130 MM HG: CPT | Performed by: FAMILY MEDICINE

## 2024-08-28 PROCEDURE — 99214 OFFICE O/P EST MOD 30 MIN: CPT | Performed by: FAMILY MEDICINE

## 2024-08-28 RX ORDER — METHYLPREDNISOLONE 4 MG
TABLET, DOSE PACK ORAL SEE ADMIN INSTRUCTIONS
COMMUNITY
Start: 2024-08-25 | End: 2024-09-06

## 2024-08-28 NOTE — ASSESSMENT & PLAN NOTE
The wax that you had had in your canal is completely clean with only a little bit of dead skin should not be a problem going out.

## 2024-08-28 NOTE — ASSESSMENT & PLAN NOTE
On your MRI it shows what looks like an aging of the blood vessels within your brain.  Typically that is due to a combination of blood pressure, diabetes, smoking, and elevated cholesterol.  Your blood pressure is under excellent control.  You are currently on a blood thinner to prevent the side effects of stroke related to atrial fibrillation.  You are on a cholesterol-lowering medication and would like to check to see where your cholesterol is and if further increases in the statin is indicated.

## 2024-08-28 NOTE — ASSESSMENT & PLAN NOTE
Would believe that that of his visual disturbance was probably related to the aging process of your brain.  Close control of blood pressure and cholesterol is indicated.  It is possible that your blood pressure may have gone just a little bit too low which triggered that event.  Pay attention to not drinking enough or being exposed to too much heat.

## 2024-08-28 NOTE — PATIENT INSTRUCTIONS
The canals look great!  Just a little bit of extra skin that will continue to fall out.      The results of the MRI suggest that you have developed some aging of the blood vessels throughout the brain.  Your history of having elevated blood pressure in the past probably has contributed.  You are currently under great control for your blood pressure.  We do not have a recent cholesterol level would like to repeat that to make sure you are we are adequately controlling your cholesterol.    The same problems that aggravate the heart also aggravate the brain smoking, cholesterol, high blood pressure, and diabetes.  You are on a blood thinner right now because of going in and out of funny heartbeats and to reduce the risk of passing a stroke.  Make sure you mention the results of these findings to your cardiologist when you see him tomorrow.

## 2024-08-28 NOTE — PROGRESS NOTES
Procedure Documentation:  Procedure:     Ear Irrigation/instrumented removal of cerumen   Location:       bilateral Ear Canals  Reason:         Impacted Wax/Cerumen  Note:             Patients ear(s) was/were irrigated with warm tap water and peroxide using Waterpik. Manual removal with cerumen curette  was not done by provider.   I was able to remove the wax/cerumen completely.   Patient tolerated the procedure well.  Pt instructions: continue current home treatments left ear had expelled moderated amt of cerumen and right ear has a small amt.  
Range:  66.0-92.0 sec      Body Surface Area 07/24/2024 2.03  m2 Final    Troponin, High Sensitivity 07/24/2024 34 (H)  0 - 14 ng/L Final    High Sensitivity Troponin values cannot be compared with other Troponin methodologies.    Ventricular Rate 07/24/2024 65  BPM Final    Atrial Rate 07/24/2024 65  BPM Final    P-R Interval 07/24/2024 174  ms Final    QRS Duration 07/24/2024 100  ms Final    Q-T Interval 07/24/2024 478  ms Final    QTc Calculation (Bazett) 07/24/2024 497  ms Final    P Axis 07/24/2024 63  degrees Final    R Axis 07/24/2024 16  degrees Final    T Axis 07/24/2024 -7  degrees Final    Ventricular Rate 07/25/2024 57  BPM Final    Atrial Rate 07/25/2024 0  BPM Final    QRS Duration 07/25/2024 90  ms Final    Q-T Interval 07/25/2024 488  ms Final    QTc Calculation (Bazett) 07/25/2024 474  ms Final    R Axis 07/25/2024 -3  degrees Final    T Axis 07/25/2024 -3  degrees Final    Ventricular Rate 07/23/2024 88  BPM Final    Atrial Rate 07/23/2024 88  BPM Final    P-R Interval 07/23/2024 164  ms Final    QRS Duration 07/23/2024 106  ms Final    Q-T Interval 07/23/2024 412  ms Final    QTc Calculation (Bazett) 07/23/2024 498  ms Final    P Axis 07/23/2024 64  degrees Final    R Axis 07/23/2024 17  degrees Final    T Axis 07/23/2024 39  degrees Final    Sodium 07/25/2024 140  136 - 145 mmol/L Final    Potassium 07/25/2024 4.3  3.7 - 5.3 mmol/L Final    Chloride 07/25/2024 109 (H)  98 - 107 mmol/L Final    CO2 07/25/2024 22  20 - 31 mmol/L Final    Anion Gap 07/25/2024 9  9 - 16 mmol/L Final    Glucose 07/25/2024 85  74 - 99 mg/dL Final    BUN 07/25/2024 10  8 - 23 mg/dL Final    Creatinine 07/25/2024 0.8  0.50 - 0.90 mg/dL Final    Est, Glom Filt Rate 07/25/2024 72  >60 mL/min/1.73m2 Final    Comment:       These results are not intended for use in patients <18 years of age.        eGFR results are calculated without a race factor using the 2021 CKD-EPI equation.  Careful clinical correlation is

## 2024-08-28 NOTE — ASSESSMENT & PLAN NOTE
Blood pressure is under excellent control the goal for individuals with a heart or cerebrovascular disease is consistently less than 120/80 if possible.  You are under great control and no change in medication indicated.  Pay attention if you are working in the heat, since your blood pressure medicine sometimes with heat will be magnified which can cause your blood pressure to go too low which can trigger 1 of those visual events that you had.

## 2024-09-02 ASSESSMENT — ENCOUNTER SYMPTOMS
CHEST TIGHTNESS: 0
SHORTNESS OF BREATH: 0
ABDOMINAL PAIN: 0
NAUSEA: 0
VOMITING: 0
COUGH: 0

## 2024-09-03 ENCOUNTER — TELEPHONE (OUTPATIENT)
Age: 79
End: 2024-09-03

## 2024-09-03 DIAGNOSIS — H53.9 VISUAL DISTURBANCES: Primary | ICD-10-CM

## 2024-09-03 NOTE — TELEPHONE ENCOUNTER
I called patient and she stated she was at a [employer] yesterday she had waffles with syrup for breakfast as she normally does and when she got the the game around 2 she ate 3 balls of chocolate candy  and shortly after she got sl dizzy and had blurry vision and her HR was racing but states she did ot now how high it was because her apple watch was too loose, that lasted about 5 hrs. She said she had a nurse who was with her told her  eat something so she did.  It subsided after 5 hrs and she has not had any instances since it occurred. Patient states she did not have any chest pain/HA/N/V or numbness tingling down her arms.  Patient states she saw cardiology last week ad he did not seem concerned because it happened rarely.  She has an appt with Ophthalmology on 9/5.  I told she probably needs to be re -evaluated in our office.  I also told her about the additional labs and that I would check with Dr. Heller to see if she wanted anything else added. I told her I would call Dr. Heller and see what she would like her to do and call her back. Patient agreeable with plan and will await my call.

## 2024-09-03 NOTE — TELEPHONE ENCOUNTER
----- Message from Dr. Jade Heller MD sent at 9/3/2024  9:07 AM EDT -----  Hey Dr Skinner,    Did you ever call patient and order ESR/CRP like we discussed? I didn't see your orders.

## 2024-09-03 NOTE — TELEPHONE ENCOUNTER
Patient will be in on Friday morning with Dr Marin.  She will see optho on Thursday 9/5.  She will call if sx reoccur or go to nearest ER.

## 2024-09-03 NOTE — TELEPHONE ENCOUNTER
Would like to order a CRP as well as a ESR to make sure that this is not an inflammatory condition that is resulting in the visual field complaint.      We had discussed that was in the differential though it would seem that it was related to the low bp and had completely resolved.      Have sent an order in for ESR and CRP to be obtained this week.   Please let us know if there is any return of symptoms.

## 2024-09-03 NOTE — TELEPHONE ENCOUNTER
Patient 100% needs to be evaluated in our office this week. She can get dr krishnamurthy labs drawn the dday she is seen in the office by a resident, should be seen before the weekend is over.     I will delete the ESR and CRP I ordered since dr piedra just reordered it so there is not a duplicate.

## 2024-09-05 NOTE — PATIENT INSTRUCTIONS
Thank you for following up with us at [facility] outpatient residency clinic! It was a pleasure to meet you today!     Our plan is the following:  -Please  your medication from the pharmacy, as we discussed I decreased your amlodipine (blood pressure medication) to 2.5 mg. Please monitor your blood pressures at home and especially if these vision symptoms occur again.   - if the symptoms continue we can refer you to neurology for further testing, as of right now it is most likely contributing to hypoperfusion (decrease blood flow to the brain) due to dehydration and low blood pressure.   - get your labs done, if they are abnormal we will give you a call.   - please notify us if symptoms return    If you have any additional questions or concerns, please call the office ([phone]) and speak to one of the staff. They will triage and forward the message to the doctors! Have a great rest of your day!

## 2024-09-05 NOTE — PROGRESS NOTES
[facility] Medicine Residency  [address] [place] [address]  Phone: [phone]  Fax: [phone]      Date of Visit: .TD  Patient Name: Rosmery Ramos   Patient :  1945     ASSESSMENT/PLAN   1. Visual disturbances  2. Visual field defect  3. Essential hypertension  - Pt was seen yesterday by Ophthalmology, which has no concerns at this time.   - Pt BP is stable today with low diastolic 120/51. PT reports not drinking enough water as she should, likely contributing to her symptoms.   -likely patient is having hypoperfusion to the brain due to hypotension and dehydration.   - Decided to decrease her Amlodipine to 2.5 mg daily, instructed pt to monitor BP and monitor during episodes. Encouraged to increase fluids.   -Advised pt to call us if she has reoccurrence of symptoms.   -ESR and CRP labs are pending to be done to [facility].   -   amLODIPine (NORVASC) 2.5 MG tablet; Take 1 tablet by mouth daily, Disp-90 tablet, R-0Normal         Return in about 2 months (around 2024) for change in BP medication, checking for reoccurance of vision changes .    HPI    Rosmery Ramos is a 79 y.o. female who presents today to discuss   Chief Complaint   Patient presents with    visual disturbances     Patient states that things are fine she went to Eye provider and they said everything was fine.        Pt is here today to discuss over her vision changes she had experienced. There is a recent telephone encounter, appears that 4 days ago pt at the [facility] experienced blurry vision, lasting about 5 hours. Associated symptoms at that time included dizziness, and tachycardia. Pt does not know exactly how high her HR went since her apple watch was loose. She denied having Chest pain, headaches, nausea or vomiting during episode.  PT saw ophthalmology yesterday for the first time and they weren't to concern as her eye exam was reassuring. Pt is at low risk for

## 2024-09-06 ENCOUNTER — OFFICE VISIT (OUTPATIENT)
Age: 79
End: 2024-09-06
Payer: COMMERCIAL

## 2024-09-06 VITALS
HEIGHT: 71 IN | HEART RATE: 79 BPM | WEIGHT: 178.2 LBS | SYSTOLIC BLOOD PRESSURE: 120 MMHG | RESPIRATION RATE: 16 BRPM | DIASTOLIC BLOOD PRESSURE: 51 MMHG | BODY MASS INDEX: 24.95 KG/M2 | TEMPERATURE: 97.6 F

## 2024-09-06 DIAGNOSIS — H53.9 VISUAL DISTURBANCES: ICD-10-CM

## 2024-09-06 DIAGNOSIS — Z23 NEED FOR VACCINATION: ICD-10-CM

## 2024-09-06 DIAGNOSIS — I10 ESSENTIAL HYPERTENSION: Primary | ICD-10-CM

## 2024-09-06 DIAGNOSIS — H53.40 VISUAL FIELD DEFECT: ICD-10-CM

## 2024-09-06 PROCEDURE — 90471 IMMUNIZATION ADMIN: CPT | Performed by: FAMILY MEDICINE

## 2024-09-06 PROCEDURE — 99214 OFFICE O/P EST MOD 30 MIN: CPT

## 2024-09-06 RX ORDER — AMLODIPINE BESYLATE 2.5 MG/1
2.5 TABLET ORAL DAILY
Qty: 90 TABLET | Refills: 0 | Status: SHIPPED | OUTPATIENT
Start: 2024-09-06

## 2024-09-06 RX ORDER — AMLODIPINE BESYLATE 2.5 MG/1
2.5 TABLET ORAL DAILY
Qty: 90 TABLET | Refills: 1 | Status: SHIPPED | OUTPATIENT
Start: 2024-09-06 | End: 2024-09-06 | Stop reason: SDUPTHER

## 2024-09-06 ASSESSMENT — ENCOUNTER SYMPTOMS
SHORTNESS OF BREATH: 0
EYE PAIN: 0
PHOTOPHOBIA: 0

## 2024-09-10 ENCOUNTER — OFFICE VISIT (OUTPATIENT)
Dept: PODIATRY | Age: 79
End: 2024-09-10
Payer: COMMERCIAL

## 2024-09-10 VITALS — BODY MASS INDEX: 24.08 KG/M2 | HEIGHT: 71 IN | WEIGHT: 172 LBS

## 2024-09-10 DIAGNOSIS — D23.71 BENIGN NEOPLASM OF SKIN OF LOWER LIMB, INCLUDING HIP, RIGHT: ICD-10-CM

## 2024-09-10 DIAGNOSIS — B35.1 DERMATOPHYTOSIS OF NAIL: ICD-10-CM

## 2024-09-10 DIAGNOSIS — I73.9 PVD (PERIPHERAL VASCULAR DISEASE) (HCC): ICD-10-CM

## 2024-09-10 DIAGNOSIS — M79.672 LEFT FOOT PAIN: Primary | ICD-10-CM

## 2024-09-10 DIAGNOSIS — M79.671 RIGHT FOOT PAIN: ICD-10-CM

## 2024-09-10 DIAGNOSIS — M79.671 PAIN IN BOTH FEET: ICD-10-CM

## 2024-09-10 DIAGNOSIS — M79.672 PAIN IN BOTH FEET: ICD-10-CM

## 2024-09-10 DIAGNOSIS — M20.42 ACQUIRED HAMMERTOE OF LEFT FOOT: ICD-10-CM

## 2024-09-10 PROCEDURE — 1123F ACP DISCUSS/DSCN MKR DOCD: CPT | Performed by: PODIATRIST

## 2024-09-10 PROCEDURE — 17110 DESTRUCTION B9 LES UP TO 14: CPT | Performed by: PODIATRIST

## 2024-09-10 PROCEDURE — 11721 DEBRIDE NAIL 6 OR MORE: CPT | Performed by: PODIATRIST

## 2024-09-10 PROCEDURE — 99213 OFFICE O/P EST LOW 20 MIN: CPT | Performed by: PODIATRIST

## 2024-10-18 RX ORDER — ANASTROZOLE 1 MG/1
1 TABLET ORAL DAILY
Qty: 90 TABLET | Refills: 1 | Status: SHIPPED | OUTPATIENT
Start: 2024-10-18

## 2024-11-06 ENCOUNTER — OFFICE VISIT (OUTPATIENT)
Age: 79
End: 2024-11-06
Payer: COMMERCIAL

## 2024-11-06 ENCOUNTER — HOSPITAL ENCOUNTER (OUTPATIENT)
Age: 79
Setting detail: SPECIMEN
Discharge: HOME | End: 2024-11-06

## 2024-11-06 VITALS
WEIGHT: 183 LBS | BODY MASS INDEX: 25.62 KG/M2 | SYSTOLIC BLOOD PRESSURE: 112 MMHG | HEART RATE: 85 BPM | HEIGHT: 71 IN | DIASTOLIC BLOOD PRESSURE: 55 MMHG | RESPIRATION RATE: 16 BRPM | TEMPERATURE: 97.6 F

## 2024-11-06 DIAGNOSIS — I10 ESSENTIAL HYPERTENSION: ICD-10-CM

## 2024-11-06 DIAGNOSIS — H53.9 VISUAL DISTURBANCES: ICD-10-CM

## 2024-11-06 DIAGNOSIS — R42 DIZZINESS: ICD-10-CM

## 2024-11-06 DIAGNOSIS — I10 ESSENTIAL HYPERTENSION: Primary | ICD-10-CM

## 2024-11-06 DIAGNOSIS — I67.2 ARTERIOSCLEROTIC CEREBROVASCULAR DISEASE: ICD-10-CM

## 2024-11-06 LAB
CHOLEST SERPL-MCNC: 149 MG/DL (ref 0–199)
CHOLESTEROL/HDL RATIO: 2.2
CRP SERPL HS-MCNC: <3 MG/L (ref 0–5)
ERYTHROCYTE [SEDIMENTATION RATE] IN BLOOD BY PHOTOMETRIC METHOD: 14 MM/HR (ref 0–30)
HDLC SERPL-MCNC: 69 MG/DL
LDLC SERPL CALC-MCNC: 67 MG/DL (ref 0–100)
TRIGL SERPL-MCNC: 64 MG/DL (ref 0–149)
VLDLC SERPL CALC-MCNC: 13 MG/DL (ref 1–30)

## 2024-11-06 PROCEDURE — 3078F DIAST BP <80 MM HG: CPT | Performed by: FAMILY MEDICINE

## 2024-11-06 PROCEDURE — 1123F ACP DISCUSS/DSCN MKR DOCD: CPT | Performed by: FAMILY MEDICINE

## 2024-11-06 PROCEDURE — 99213 OFFICE O/P EST LOW 20 MIN: CPT | Performed by: FAMILY MEDICINE

## 2024-11-06 PROCEDURE — 3074F SYST BP LT 130 MM HG: CPT | Performed by: FAMILY MEDICINE

## 2024-11-06 NOTE — PATIENT INSTRUCTIONS
Thank you for following up with us at [facility] outpatient residency clinic! It was a pleasure to meet you today!     Our plan is the following:  - as we spoke we discontinued your Amlodipine (blood pressure medication) due to swelling as well as your Blood pressure has been well   - Sending you home with a Blood pressure log, make sure to monitor blood pressure this month in the mornings, to help see if we need to place you back on a different medications  - we suggests greatly using compression socks, which can be purchased on [employer]  - get your labs done before you leave  - Follow up with cardiology  - and continue taking Eliquis 5 mg twice daily as we spoke     If you have any additional questions or concerns, please call the office ([phone]) and speak to one of the staff. They will triage and forward the message to the doctors! Have a great rest of your day!

## 2024-11-06 NOTE — PROGRESS NOTES
[facility] Family Medicine Residency  [address] [place] [address]  Phone: [phone]  Fax: [phone]      Date of Visit: .TD  Patient Name: Rosmery Ramos   Patient :  1945     ASSESSMENT/PLAN   1. Essential hypertension   - /55, amlodipine was decreased to 2.5 mg last visit.    - will discontinue at this time due to leg swelling. BP log sent home with to track BP for a month to see if pt needs a BP medication or not  2. Visual disturbances   - Pt reports no recent episodes of vision changes but doesn't have some blurriness   - pt needs to follow up with ophthalmology which she still hasn't    - labs pending CRP, ESR to r/o Gaint temporal arteritis is still pending, will get done after our appointment   3. Dizziness    - denies repetitive episodes   - admits to not hydrating enough    - Encouraged adequate hydration is important     Return in about 4 weeks (around 2024) for blood pressure off medication.    HPI    Rosmery Ramos is a 79 y.o. female who presents today to discuss   Chief Complaint   Patient presents with    Follow-up     2 month f/u     Patient presents to the office for follow-up on hypertension, dizziness, and visual disturbances that she was experiencing about 2 months ago.  Patient reports that she has not had any reoccurrence of episodes.  On last visit the patient's amlodipine was dropped to 2.5 mg today's blood pressure in office is 112/55.  Patient does have a trace of edema on bilateral legs at this time it is okay to discontinue amlodipine going home with a BP log to be tracked for a month to see if patient is to be restarting any blood pressure medications at a low dose.  Patient is agreeable to this.      Patient also admits that she has not been drinking or hydrating herself well at home.  I encouraged this again of the importance of keeping maintaining hydration to these episodes to repeat again.  Patient was also

## 2024-11-12 ENCOUNTER — OFFICE VISIT (OUTPATIENT)
Dept: PODIATRY | Age: 79
End: 2024-11-12
Payer: COMMERCIAL

## 2024-11-12 VITALS — BODY MASS INDEX: 25.62 KG/M2 | HEIGHT: 71 IN | WEIGHT: 183 LBS

## 2024-11-12 DIAGNOSIS — M79.671 RIGHT FOOT PAIN: ICD-10-CM

## 2024-11-12 DIAGNOSIS — R26.2 TROUBLE WALKING: ICD-10-CM

## 2024-11-12 DIAGNOSIS — D23.71 BENIGN NEOPLASM OF SKIN OF LOWER LIMB, INCLUDING HIP, RIGHT: Primary | ICD-10-CM

## 2024-11-12 DIAGNOSIS — B35.1 DERMATOPHYTOSIS OF NAIL: ICD-10-CM

## 2024-11-12 DIAGNOSIS — M79.672 PAIN IN BOTH FEET: ICD-10-CM

## 2024-11-12 DIAGNOSIS — D23.72 BENIGN NEOPLASM OF SKIN OF LOWER LIMB, INCLUDING HIP, LEFT: ICD-10-CM

## 2024-11-12 DIAGNOSIS — I73.9 PVD (PERIPHERAL VASCULAR DISEASE) (HCC): ICD-10-CM

## 2024-11-12 DIAGNOSIS — M79.671 PAIN IN BOTH FEET: ICD-10-CM

## 2024-11-12 DIAGNOSIS — M79.672 LEFT FOOT PAIN: ICD-10-CM

## 2024-11-12 PROCEDURE — 17110 DESTRUCTION B9 LES UP TO 14: CPT | Performed by: PODIATRIST

## 2024-11-12 PROCEDURE — 11721 DEBRIDE NAIL 6 OR MORE: CPT | Performed by: PODIATRIST

## 2024-11-12 RX ORDER — AMLODIPINE BESYLATE 5 MG/1
TABLET ORAL
COMMUNITY
Start: 2024-09-06

## 2024-11-12 NOTE — PROGRESS NOTES
Left    Incurvation/Ingrowin   [] [] [] [] [] [] [] [] [] []  5 4 3 2 1 1 2 3 4 5                         Right                                        Left    Inflammation/Pain   [x] [x] [x] [x] [x] [x] [x] [x] [x] [x]  5 4 3  2 1 1 2 3 4 5                         Right                                        Left       Nails are painful 1-10 with direct palpation.      Q7   []Yes  []No                Q8   [x]Yes  []No                     Q9   []Yes    []No    Assessment:  79 y.o. female with:    Diagnosis Orders   1. Benign neoplasm of skin of lower limb, including hip, right  DESTRUC BENIGN LESION, UP TO 14 LESIONS      2. Dermatophytosis of nail  DEBRIDEMENT OF NAILS, 6 OR MORE      3. Left foot pain  DEBRIDEMENT OF NAILS, 6 OR MORE    DESTRUC BENIGN LESION, UP TO 14 LESIONS      4. Right foot pain  DEBRIDEMENT OF NAILS, 6 OR MORE    DESTRUC BENIGN LESION, UP TO 14 LESIONS      5. PVD (peripheral vascular disease) (HCC)  DEBRIDEMENT OF NAILS, 6 OR MORE    DESTRUC BENIGN LESION, UP TO 14 LESIONS      6. Pain in both feet  DEBRIDEMENT OF NAILS, 6 OR MORE    DESTRUC BENIGN LESION, UP TO 14 LESIONS      7. Trouble walking  DEBRIDEMENT OF NAILS, 6 OR MORE    DESTRUC BENIGN LESION, UP TO 14 LESIONS      8. Benign neoplasm of skin of lower limb, including hip, left  DESTRUC BENIGN LESION, UP TO 14 LESIONS                Plan:   Pt was evaluated and examined. Patient was given personalized discharge instructions.  Nails 1-10 were debrided in length and thickness sharply with a nail nipper and  without incident. Pt will follow up in 9 weeks or sooner if any problems arise. Diagnosis was discussed with the pt and all of their questions were answered in detail. Proper foot hygiene and care was discussed with the pt. Patient to check feet daily and contact the office with any questions/problems/concerns.  Other comorbidity noted and will be managed by PCP.  Pain waiver discussed with patient and confirmed.

## 2024-12-11 ENCOUNTER — OFFICE VISIT (OUTPATIENT)
Age: 79
End: 2024-12-11
Payer: COMMERCIAL

## 2024-12-11 VITALS
SYSTOLIC BLOOD PRESSURE: 120 MMHG | DIASTOLIC BLOOD PRESSURE: 71 MMHG | TEMPERATURE: 97.3 F | BODY MASS INDEX: 24.49 KG/M2 | HEIGHT: 72 IN | WEIGHT: 180.8 LBS | RESPIRATION RATE: 16 BRPM

## 2024-12-11 DIAGNOSIS — I47.10 SVT (SUPRAVENTRICULAR TACHYCARDIA) (HCC): ICD-10-CM

## 2024-12-11 DIAGNOSIS — I48.0 PAROXYSMAL A-FIB (HCC): ICD-10-CM

## 2024-12-11 DIAGNOSIS — I12.9 HYPERTENSIVE KIDNEY DISEASE WITH STAGE 3A CHRONIC KIDNEY DISEASE (HCC): Primary | ICD-10-CM

## 2024-12-11 DIAGNOSIS — N18.31 HYPERTENSIVE KIDNEY DISEASE WITH STAGE 3A CHRONIC KIDNEY DISEASE (HCC): Primary | ICD-10-CM

## 2024-12-11 DIAGNOSIS — Z78.9 NONSMOKER: ICD-10-CM

## 2024-12-11 PROBLEM — D05.12 DUCTAL CARCINOMA IN SITU (DCIS) OF LEFT BREAST: Status: ACTIVE | Noted: 2020-01-10

## 2024-12-11 PROCEDURE — 3074F SYST BP LT 130 MM HG: CPT | Performed by: FAMILY MEDICINE

## 2024-12-11 PROCEDURE — 99214 OFFICE O/P EST MOD 30 MIN: CPT | Performed by: FAMILY MEDICINE

## 2024-12-11 PROCEDURE — 1123F ACP DISCUSS/DSCN MKR DOCD: CPT | Performed by: FAMILY MEDICINE

## 2024-12-11 PROCEDURE — 1159F MED LIST DOCD IN RCRD: CPT | Performed by: FAMILY MEDICINE

## 2024-12-11 PROCEDURE — 3078F DIAST BP <80 MM HG: CPT | Performed by: FAMILY MEDICINE

## 2024-12-11 RX ORDER — OLMESARTAN MEDOXOMIL 20 MG/1
20 TABLET ORAL DAILY
Qty: 30 TABLET | Refills: 1 | Status: SHIPPED | OUTPATIENT
Start: 2024-12-11 | End: 2025-02-09
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
## 2024-12-18 RX ORDER — ROSUVASTATIN CALCIUM 20 MG/1
20 TABLET, COATED ORAL DAILY
Qty: 90 TABLET | Refills: 1 | Status: SHIPPED | OUTPATIENT
Start: 2024-12-18

## 2025-01-05 NOTE — PROGRESS NOTES
[facility] Medicine Residency  [address] [place] [address]  Phone: [phone]  Fax: [phone]    OFFICE VISIT       Date of Visit:  2025  Patient Name: Rosmery Ramos   Patient :  1945     ASSESSMENT/PLAN     1. Essential hypertension  2. Hypertensive kidney disease with stage 3a chronic kidney disease (HCC)  3. Nonsmoker       Essential hypertension  Hypertensive kidney disease with stage IIIa CKD  Blood pressure well-controlled in office today at 128/78  Continue olmesartan 20 mg once daily  Patient encouraged to complete lab work put in by Dr. Bowser last visit to repeat BMP.  Patient will complete labs today  Encouraged Mediterranean and Dash diet, provided handouts for patient to review at her convenience      Resident Attestation:  Personally discussed with preceptor Dr. Skinner at the time of the encounter.  Salah Awadalla MD  Family Medicine, PGY3  [address] [facility] [address] [phone]       Questions/concerns answered. Patient verbalized and expressed understanding. Medications, laboratory testing, imaging, consultation, and follow up as documented in this encounter. I personally reviewed the patient's past medical history, current medications, allergies, surgical history, family history and social history. Updates were made as necessary. Please be aware portions of this note were completed using voice recognition software and unforeseen errors may have occurred    HPI:     Rosmery Ramos is a 79 y.o. female who presents today to discuss blood pressure.    Patient was last seen in office 2024 by Dr. Bowser for hypertensive kidney disease with stage IIIa CKD.  Patient was started on olmesartan 20 mg once daily.  An order for repeat BMP was placed however not completed.    Blood pressure in office is 128/78.  Patient continues to use a wrist blood pressure cuff with blood pressures noted in the 160s.  She was

## 2025-01-06 ENCOUNTER — HOSPITAL ENCOUNTER (OUTPATIENT)
Age: 80
Setting detail: SPECIMEN
Discharge: HOME | End: 2025-01-06

## 2025-01-06 ENCOUNTER — OFFICE VISIT (OUTPATIENT)
Age: 80
End: 2025-01-06
Payer: MEDICARE

## 2025-01-06 VITALS
TEMPERATURE: 97.4 F | WEIGHT: 182 LBS | RESPIRATION RATE: 18 BRPM | BODY MASS INDEX: 25.03 KG/M2 | SYSTOLIC BLOOD PRESSURE: 128 MMHG | HEART RATE: 84 BPM | DIASTOLIC BLOOD PRESSURE: 78 MMHG

## 2025-01-06 DIAGNOSIS — I10 ESSENTIAL HYPERTENSION: Primary | ICD-10-CM

## 2025-01-06 DIAGNOSIS — I12.9 HYPERTENSIVE KIDNEY DISEASE WITH STAGE 3A CHRONIC KIDNEY DISEASE (HCC): ICD-10-CM

## 2025-01-06 DIAGNOSIS — Z78.9 NONSMOKER: ICD-10-CM

## 2025-01-06 DIAGNOSIS — N18.31 HYPERTENSIVE KIDNEY DISEASE WITH STAGE 3A CHRONIC KIDNEY DISEASE (HCC): ICD-10-CM

## 2025-01-06 LAB
ANION GAP SERPL CALCULATED.3IONS-SCNC: 9 MMOL/L (ref 9–16)
BUN SERPL-MCNC: 19 MG/DL (ref 8–23)
CALCIUM SERPL-MCNC: 9.6 MG/DL (ref 8.6–10.4)
CHLORIDE SERPL-SCNC: 109 MMOL/L (ref 98–107)
CO2 SERPL-SCNC: 27 MMOL/L (ref 20–31)
CREAT SERPL-MCNC: 1.1 MG/DL (ref 0.6–0.9)
CREAT UR-MCNC: 99.1 MG/DL (ref 28–217)
GFR, ESTIMATED: 51 ML/MIN/1.73M2
GLUCOSE SERPL-MCNC: 72 MG/DL (ref 74–99)
POTASSIUM SERPL-SCNC: 4 MMOL/L (ref 3.7–5.3)
SODIUM SERPL-SCNC: 145 MMOL/L (ref 136–145)
TOTAL PROTEIN, URINE: 6 MG/DL
URINE TOTAL PROTEIN CREATININE RATIO: 0.06 (ref 0–0.2)

## 2025-01-06 PROCEDURE — 1159F MED LIST DOCD IN RCRD: CPT

## 2025-01-06 PROCEDURE — 99213 OFFICE O/P EST LOW 20 MIN: CPT

## 2025-01-06 PROCEDURE — 3078F DIAST BP <80 MM HG: CPT

## 2025-01-06 PROCEDURE — 3074F SYST BP LT 130 MM HG: CPT

## 2025-01-06 PROCEDURE — 1123F ACP DISCUSS/DSCN MKR DOCD: CPT

## 2025-01-06 RX ORDER — NYSTATIN 100000 U/G
CREAM TOPICAL
Qty: 15 G | Refills: 0 | Status: SHIPPED | OUTPATIENT
Start: 2025-01-06

## 2025-01-06 ASSESSMENT — ENCOUNTER SYMPTOMS
WHEEZING: 0
NAUSEA: 0
SHORTNESS OF BREATH: 0
SORE THROAT: 0
COUGH: 0
DIARRHEA: 0
ABDOMINAL PAIN: 0
VOMITING: 0

## 2025-01-06 ASSESSMENT — PATIENT HEALTH QUESTIONNAIRE - PHQ9
SUM OF ALL RESPONSES TO PHQ QUESTIONS 1-9: 0
2. FEELING DOWN, DEPRESSED OR HOPELESS: NOT AT ALL
SUM OF ALL RESPONSES TO PHQ QUESTIONS 1-9: 0
SUM OF ALL RESPONSES TO PHQ QUESTIONS 1-9: 0
1. LITTLE INTEREST OR PLEASURE IN DOING THINGS: NOT AT ALL
SUM OF ALL RESPONSES TO PHQ9 QUESTIONS 1 & 2: 0
SUM OF ALL RESPONSES TO PHQ QUESTIONS 1-9: 0

## 2025-01-06 NOTE — PATIENT INSTRUCTIONS
Thank you for coming into the clinic today  -As discussed your blood pressure in office today is very good at 128/78.  We will not need to change your medications at all.  I would like you to complete your lab work on your way out to make sure your kidney function is looking good after you have been started on the olmesartan.  -I would recommend the Mediterranean diet and the DASH diet.  I will put information for you to review at your convenience  -I will have you follow-up in clinic in 3 months as this is what you prefer to continue monitoring your blood pressure and chronic medical conditions

## 2025-01-07 DIAGNOSIS — I12.9 HYPERTENSIVE KIDNEY DISEASE WITH STAGE 3A CHRONIC KIDNEY DISEASE (HCC): ICD-10-CM

## 2025-01-07 DIAGNOSIS — N18.31 HYPERTENSIVE KIDNEY DISEASE WITH STAGE 3A CHRONIC KIDNEY DISEASE (HCC): ICD-10-CM

## 2025-01-07 RX ORDER — METOPROLOL SUCCINATE 50 MG/1
50 TABLET, EXTENDED RELEASE ORAL DAILY
Qty: 90 TABLET | Refills: 0 | Status: SHIPPED | OUTPATIENT
Start: 2025-01-07 | End: 2025-04-07

## 2025-01-07 RX ORDER — ROSUVASTATIN CALCIUM 20 MG/1
20 TABLET, COATED ORAL DAILY
Qty: 90 TABLET | Refills: 0 | Status: SHIPPED | OUTPATIENT
Start: 2025-01-07 | End: 2025-04-07

## 2025-01-07 RX ORDER — OLMESARTAN MEDOXOMIL 20 MG/1
20 TABLET ORAL DAILY
Qty: 90 TABLET | Refills: 0 | Status: SHIPPED | OUTPATIENT
Start: 2025-01-07 | End: 2025-04-07

## 2025-01-07 RX ORDER — ALENDRONATE SODIUM 70 MG/1
70 TABLET ORAL
Qty: 12 TABLET | Refills: 0 | Status: SHIPPED | OUTPATIENT
Start: 2025-01-07 | End: 2025-04-07

## 2025-01-07 RX ORDER — ANASTROZOLE 1 MG/1
1 TABLET ORAL DAILY
Qty: 90 TABLET | Refills: 0 | Status: SHIPPED | OUTPATIENT
Start: 2025-01-07 | End: 2025-04-07

## 2025-01-16 ENCOUNTER — OFFICE VISIT (OUTPATIENT)
Dept: PODIATRY | Age: 80
End: 2025-01-16

## 2025-01-16 VITALS — BODY MASS INDEX: 23.3 KG/M2 | HEIGHT: 72 IN | WEIGHT: 172 LBS

## 2025-01-16 DIAGNOSIS — M20.42 ACQUIRED HAMMERTOE OF LEFT FOOT: ICD-10-CM

## 2025-01-16 DIAGNOSIS — R26.2 TROUBLE WALKING: ICD-10-CM

## 2025-01-16 DIAGNOSIS — D23.71 BENIGN NEOPLASM OF SKIN OF LOWER LIMB, INCLUDING HIP, RIGHT: Primary | ICD-10-CM

## 2025-01-16 DIAGNOSIS — M79.671 PAIN IN BOTH FEET: ICD-10-CM

## 2025-01-16 DIAGNOSIS — M20.5X1 ACQUIRED DIGITI QUINTI VARUS DEFORMITY OF RIGHT FOOT: ICD-10-CM

## 2025-01-16 DIAGNOSIS — M79.672 PAIN IN BOTH FEET: ICD-10-CM

## 2025-01-16 DIAGNOSIS — B35.1 DERMATOPHYTOSIS OF NAIL: ICD-10-CM

## 2025-01-16 DIAGNOSIS — D23.72 BENIGN NEOPLASM OF SKIN OF LOWER LIMB, INCLUDING HIP, LEFT: ICD-10-CM

## 2025-01-16 DIAGNOSIS — M79.672 LEFT FOOT PAIN: ICD-10-CM

## 2025-01-16 DIAGNOSIS — I73.9 PVD (PERIPHERAL VASCULAR DISEASE) (HCC): ICD-10-CM

## 2025-01-16 DIAGNOSIS — M79.671 RIGHT FOOT PAIN: ICD-10-CM

## 2025-01-16 DIAGNOSIS — M79.676 PAIN OF FIFTH TOE: ICD-10-CM

## 2025-01-16 NOTE — PROGRESS NOTES
[facility] PODIATRY [address]  [address]  [place] [address]  Dept: [phone]  Dept Fax: [phone]     FOOT PAIN & BENIGN NEOPLASM PROGRESS NOTE  Date of patient's visit: 1/16/2025  Patient's Name:  Rosmery Ramos YOB: 1945            Patient Care Team:  Jade Heller MD as PCP - General (Family Medicine)  Jade Heller MD as PCP - Empaneled Provider  Cyrus Hutson DPM as Physician (Podiatry)          Chief Complaint   Patient presents with    Nail Problem     Toenail trim    Benign Neoplasm     Bilateral foot    Foot Pain     Bilateral foot       Subjective:   This Rosmery Ramos comes to clinic for foot and nail care.  Pt currently has complaint of thickened, painful, elongated nails that he/she cannot manage by themselves.  Pt. Relates pain to nails with shoe gear.  Pt's primary care physician is Jade Heller MDlast seen 1/6/25.  Past Medical History:   Diagnosis Date    Arthritis     Atrial fibrillation (HCC)     Cancer (HCC)     left breast cancer    Hyperlipidemia     Hypertension     Lumbar stenosis     Multiple thyroid nodules     Nonsmoker     Prediabetes     Stage 3a chronic kidney disease (CKD) (HCC)     Subclinical hyperthyroidism     SVT (supraventricular tachycardia) (HCC) 2009    Vitamin D deficiency    Rosmery Ramos is a 79 y.o. female. Painful lesions her   feet.. They have been present for 3 month(s) duration.  The lesions are present on the left foot. The patient has 1 lesion that is deep seated and has a painful core. Treatment has included pads and tape      Pt has a new complaint of hammer toe pain to the left foot.  Patient states she does have less swelling to the area.        Allergies   Allergen Reactions    No Known Allergies      Current Outpatient Medications on File Prior to Visit   Medication Sig Dispense Refill    rosuvastatin (CRESTOR) 20 MG tablet Take 1 tablet by mouth

## 2025-02-20 ENCOUNTER — TELEPHONE (OUTPATIENT)
Age: 80
End: 2025-02-20

## 2025-02-20 DIAGNOSIS — H53.123 TRANSIENT VISUAL LOSS, BILATERAL: ICD-10-CM

## 2025-02-20 DIAGNOSIS — I67.89 CEREBRAL MICROVASCULAR DISEASE: Primary | ICD-10-CM

## 2025-02-20 DIAGNOSIS — H54.7 VISION LOSS: ICD-10-CM

## 2025-02-20 NOTE — TELEPHONE ENCOUNTER
I received a letter from patients eye doctor given painless vision loss. She was supposed to go to ER in [place] and never did. We did an MRI and she was supposed to follow up in office and I did not get to see her, I was on maternity leave. MRI showed severe chronic small vessel ischemic changes. I do want to order carotid US before she sees me if possible.     Please let her know I ordered this.  Eye doctor recommended this as well.

## 2025-02-27 ENCOUNTER — HOSPITAL ENCOUNTER (OUTPATIENT)
Dept: VASCULAR LAB | Age: 80
Discharge: HOME | End: 2025-03-01
Attending: STUDENT IN AN ORGANIZED HEALTH CARE EDUCATION/TRAINING PROGRAM
Payer: MEDICARE

## 2025-02-27 DIAGNOSIS — H54.7 VISION LOSS: ICD-10-CM

## 2025-02-27 DIAGNOSIS — H53.123 TRANSIENT VISUAL LOSS, BILATERAL: ICD-10-CM

## 2025-02-27 DIAGNOSIS — I67.89 CEREBRAL MICROVASCULAR DISEASE: ICD-10-CM

## 2025-02-27 PROCEDURE — 93880 EXTRACRANIAL BILAT STUDY: CPT

## 2025-02-28 LAB
VAS LEFT BULB EDV: 14.2 CM/S
VAS LEFT BULB PSV: 60.4 CM/S
VAS LEFT CCA DIST EDV: 12 CM/S
VAS LEFT CCA DIST PSV: 65.9 CM/S
VAS LEFT CCA PROX EDV: 12 CM/S
VAS LEFT CCA PROX PSV: 76.9 CM/S
VAS LEFT ECA EDV: 9.9 CM/S
VAS LEFT ECA PSV: 37.7 CM/S
VAS LEFT ICA DIST EDV: 18.7 CM/S
VAS LEFT ICA DIST PSV: 46 CM/S
VAS LEFT ICA PROX EDV: 16.4 CM/S
VAS LEFT ICA PROX PSV: 67 CM/S
VAS LEFT ICA/CCA PSV: 1.02 NO UNITS
VAS LEFT VERTEBRAL EDV: 20.8 CM/S
VAS LEFT VERTEBRAL PSV: 53.8 CM/S
VAS RIGHT BULB EDV: 10.9 CM/S
VAS RIGHT BULB PSV: 51.6 CM/S
VAS RIGHT CCA DIST EDV: 19.7 CM/S
VAS RIGHT CCA DIST PSV: 67 CM/S
VAS RIGHT CCA PROX EDV: 16.4 CM/S
VAS RIGHT CCA PROX PSV: 79.1 CM/S
VAS RIGHT ECA EDV: 6.5 CM/S
VAS RIGHT ECA PSV: 58.2 CM/S
VAS RIGHT ICA DIST EDV: 18.6 CM/S
VAS RIGHT ICA DIST PSV: 53.8 CM/S
VAS RIGHT ICA PROX EDV: 13.1 CM/S
VAS RIGHT ICA PROX PSV: 40.6 CM/S
VAS RIGHT ICA/CCA PSV: 0.8 NO UNITS
VAS RIGHT VERTEBRAL EDV: 10.9 CM/S
VAS RIGHT VERTEBRAL PSV: 37.3 CM/S

## 2025-03-03 LAB
LEFT VENTRICULAR EJECTION FRACTION MODE: NORMAL
LV EF: 52 %

## 2025-03-17 ENCOUNTER — OFFICE VISIT (OUTPATIENT)
Age: 80
End: 2025-03-17
Payer: MEDICARE

## 2025-03-17 VITALS
WEIGHT: 182.6 LBS | SYSTOLIC BLOOD PRESSURE: 123 MMHG | HEART RATE: 57 BPM | HEIGHT: 71 IN | TEMPERATURE: 97.5 F | RESPIRATION RATE: 16 BRPM | DIASTOLIC BLOOD PRESSURE: 82 MMHG | BODY MASS INDEX: 25.56 KG/M2

## 2025-03-17 VITALS
RESPIRATION RATE: 16 BRPM | TEMPERATURE: 97.5 F | BODY MASS INDEX: 25.48 KG/M2 | DIASTOLIC BLOOD PRESSURE: 82 MMHG | SYSTOLIC BLOOD PRESSURE: 123 MMHG | WEIGHT: 182 LBS | HEART RATE: 57 BPM | HEIGHT: 71 IN

## 2025-03-17 DIAGNOSIS — Z11.59 NEED FOR HEPATITIS C SCREENING TEST: ICD-10-CM

## 2025-03-17 DIAGNOSIS — M25.511 ACUTE PAIN OF RIGHT SHOULDER: Primary | ICD-10-CM

## 2025-03-17 DIAGNOSIS — Z00.00 MEDICARE ANNUAL WELLNESS VISIT, SUBSEQUENT: Primary | ICD-10-CM

## 2025-03-17 DIAGNOSIS — N18.32 STAGE 3B CHRONIC KIDNEY DISEASE (HCC): ICD-10-CM

## 2025-03-17 DIAGNOSIS — Z11.4 ENCOUNTER FOR SCREENING FOR HIV: ICD-10-CM

## 2025-03-17 DIAGNOSIS — Z12.31 ENCOUNTER FOR SCREENING MAMMOGRAM FOR MALIGNANT NEOPLASM OF BREAST: ICD-10-CM

## 2025-03-17 DIAGNOSIS — I48.0 PAROXYSMAL A-FIB (HCC): ICD-10-CM

## 2025-03-17 DIAGNOSIS — R73.09 ELEVATED GLUCOSE LEVEL: ICD-10-CM

## 2025-03-17 DIAGNOSIS — Z13.1 DIABETES MELLITUS SCREENING: ICD-10-CM

## 2025-03-17 PROCEDURE — 3074F SYST BP LT 130 MM HG: CPT | Performed by: FAMILY MEDICINE

## 2025-03-17 PROCEDURE — 1123F ACP DISCUSS/DSCN MKR DOCD: CPT | Performed by: FAMILY MEDICINE

## 2025-03-17 PROCEDURE — 99213 OFFICE O/P EST LOW 20 MIN: CPT

## 2025-03-17 PROCEDURE — 1125F AMNT PAIN NOTED PAIN PRSNT: CPT

## 2025-03-17 PROCEDURE — G0439 PPPS, SUBSEQ VISIT: HCPCS | Performed by: FAMILY MEDICINE

## 2025-03-17 PROCEDURE — 3079F DIAST BP 80-89 MM HG: CPT | Performed by: FAMILY MEDICINE

## 2025-03-17 PROCEDURE — 1159F MED LIST DOCD IN RCRD: CPT | Performed by: FAMILY MEDICINE

## 2025-03-17 RX ORDER — AMLODIPINE BESYLATE 5 MG/1
1 TABLET ORAL 2 TIMES DAILY
COMMUNITY
Start: 2025-02-25 | End: 2025-03-27

## 2025-03-17 RX ORDER — ACETAMINOPHEN 500 MG
1000 TABLET ORAL 4 TIMES DAILY PRN
Qty: 360 TABLET | Refills: 1 | Status: SHIPPED | OUTPATIENT
Start: 2025-03-17

## 2025-03-17 SDOH — ECONOMIC STABILITY: FOOD INSECURITY: WITHIN THE PAST 12 MONTHS, YOU WORRIED THAT YOUR FOOD WOULD RUN OUT BEFORE YOU GOT MONEY TO BUY MORE.: NEVER TRUE

## 2025-03-17 SDOH — ECONOMIC STABILITY: FOOD INSECURITY: WITHIN THE PAST 12 MONTHS, THE FOOD YOU BOUGHT JUST DIDN'T LAST AND YOU DIDN'T HAVE MONEY TO GET MORE.: NEVER TRUE

## 2025-03-17 ASSESSMENT — LIFESTYLE VARIABLES
HOW OFTEN DO YOU HAVE A DRINK CONTAINING ALCOHOL: MONTHLY OR LESS
HOW MANY STANDARD DRINKS CONTAINING ALCOHOL DO YOU HAVE ON A TYPICAL DAY: 1 OR 2

## 2025-03-17 ASSESSMENT — PATIENT HEALTH QUESTIONNAIRE - PHQ9
SUM OF ALL RESPONSES TO PHQ QUESTIONS 1-9: 0
2. FEELING DOWN, DEPRESSED OR HOPELESS: NOT AT ALL
SUM OF ALL RESPONSES TO PHQ QUESTIONS 1-9: 0
1. LITTLE INTEREST OR PLEASURE IN DOING THINGS: NOT AT ALL

## 2025-03-17 NOTE — PROGRESS NOTES
[facility] Family Medicine Residency  [address] [place] [address]  Phone: [phone]  Fax: [phone]      Date of Visit:  3/20/2025  Patient Name: Rosmery Ramos   Patient :  1945     HPI:     Rosmery Ramos is a 79 y.o. female who presents today to discuss htn.      Patient here for follow-up of elevated blood pressure. Blood pressure is well controlled at home. Cardiac symptoms: none. Patient denies: chest pain, chest pressure/discomfort, claudication, dyspnea, exertional chest pressure/discomfort, fatigue, irregular heart beat, lower extremity edema, near-syncope, orthopnea, palpitations, paroxysmal nocturnal dyspnea, syncope, and tachypnea. Cardiovascular risk factors: advanced age (older than 55 for men, 65 for women), dyslipidemia, and hypertension. Use of agents associated with hypertension: none. She was previously on amlodipine which was dc on amlodipine.  She is on Benicar 20 mg qd. She is also on a beta blocker. She was supposed to stop amlodipine however she thinks she is taking it.      She does have an orthopedic appt for her shoulder next week. She will let me know if pain worsens before then.   I personally reviewed the patient's past medical history, current medications, allergies, surgical history, family history and social history.  Updates were made as necessary.    REVIEW OF SYSTEM      Review of Systems   Constitutional:  Negative for chills, fatigue and fever.   Respiratory:  Negative for cough and shortness of breath.    Cardiovascular:  Negative for chest pain, palpitations and leg swelling.   Gastrointestinal:  Negative for abdominal pain, nausea and vomiting.   Genitourinary:  Negative for dysuria.   Neurological:  Negative for light-headedness and headaches.       REVIEWED INFORMATION      Allergies   Allergen Reactions    No Known Allergies        Patient Active Problem List   Diagnosis    Atrial fibrillation with tachycardic
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
## 2025-03-17 NOTE — PATIENT INSTRUCTIONS
to [email or web address] and enter F075 to learn more about \"A Healthy Heart: Care Instructions.\"  Current as of: July 31, 2024  Content Version: 14.4  © 6997-6850 [employer].   Care instructions adapted under license by [employer]. If you have questions about a medical condition or this instruction, always ask your healthcare professional. [employer] disclaims any warranty or liability for your use of this information.    Personalized Preventive Plan for Rosmery Ramos - 3/17/2025  Medicare offers a range of preventive health benefits. Some of the tests and screenings are paid in full while other may be subject to a deductible, co-insurance, and/or copay.  Some of these benefits include a comprehensive review of your medical history including lifestyle, illnesses that may run in your family, and various assessments and screenings as appropriate.  After reviewing your medical record and screening and assessments performed today your provider may have ordered immunizations, labs, imaging, and/or referrals for you.  A list of these orders (if applicable) as well as your Preventive Care list are included within your After Visit Summary for your review.

## 2025-03-17 NOTE — PROGRESS NOTES
Medicare Service Recommended Frequency Last Done Due next   Pneumonia vaccine After 65, Prevnar 20 ONCE Completed PCV 13 (1/9/2020) and PCV 23 (10/12/2022) PCV 20 is due 5 years after your last pneumonia vaccine which was done in 2022.  Your next PCV 20 will be due in October 12, 2027   Influenza vaccine Yearly Completed 9/6/2024 You have completed 2024 flu vaccine.  Due next year in September 2025   Zoster/Shingles Shingrix vaccine:  2 shots 2-6 months apart  Completed 3/14/2024 You have completed your shingles vaccines   COVID Variable Completed 3/14/2024 You have completed the 2024 COVID booster, next COVID vaccine to be determined by CDC   Tetanus vaccine (Td or Tdap) Every 10 years  Completed 12/15/2014 Tdap due today, this can be completed at your local pharmacy   RSV vaccine One injection Completed 11/3/2023 You have completed the RSV vaccine this is a once in a lifetime vaccination   Mammogram Every 1-2 years (ages ~40-75) Completed 7/10/2024 with 1 year follow-up recommended Due 7/10/2025, you can continue following Dr. Bloom for this   Colorectal Cancer Screening FIT test yearly, Cologuard every 3 years, Colonoscopy every 10 years *unless otherwise indicated* Completed 2017 You are past the age for routine colon cancer screenings are recommended   Prostate Cancer Shared decision making with patient depending on family history and risk N/A N/A   Cardiovascular/cholesterol screening As determined by your physician 11/6/2024  Total cholesterol: 149  Triglycerides: 64  HDL (good cholesterol): 69  LDL (bad cholesterol): 67 I do not recommend additional lipid testing   Diabetes screening   As determined by your physician BMP showed glucose of 106 on 12/9/2024 Will order hemoglobin A1c   Osteoporosis screening   DEXA every 2 years for females (ages 65-85) Completed 7/10/2024, showed osteopenia Due July 2026   Screening for abdominal aortic aneurysm One-time screening in patients if you have a family history of

## 2025-03-17 NOTE — PATIENT INSTRUCTIONS
It was nice to meet you and thank you for coming in today!      Here is our plan:    Right shoulder pain  Please call to schedule with Ortho  You can take 1000 mg Tylenol every 6 hours.  Maximum of 4000 mg in a 24-hour period  Please do the gentle range of motion exercises 2-3 times per day  I have also sent in something called Voltaren gel you can put this on 4 times a day to the        Have a good rest of your day! Don't hesitate to message on [employer] or Call the office if you have any questions.     Laboratory services   -available Mon-Friday 7:30AM-4PM Daily with a Lunch break 12:30-1PM Daily.

## 2025-03-18 ASSESSMENT — ENCOUNTER SYMPTOMS
COUGH: 0
SHORTNESS OF BREATH: 0
ABDOMINAL PAIN: 0
NAUSEA: 0
VOMITING: 0

## 2025-03-20 ENCOUNTER — HOSPITAL ENCOUNTER (OUTPATIENT)
Age: 80
Setting detail: SPECIMEN
Discharge: HOME | End: 2025-03-20

## 2025-03-20 ENCOUNTER — OFFICE VISIT (OUTPATIENT)
Dept: PODIATRY | Age: 80
End: 2025-03-20

## 2025-03-20 ENCOUNTER — OFFICE VISIT (OUTPATIENT)
Age: 80
End: 2025-03-20
Payer: MEDICARE

## 2025-03-20 ENCOUNTER — RESULTS FOLLOW-UP (OUTPATIENT)
Age: 80
End: 2025-03-20

## 2025-03-20 VITALS
HEIGHT: 71 IN | RESPIRATION RATE: 14 BRPM | SYSTOLIC BLOOD PRESSURE: 117 MMHG | HEART RATE: 72 BPM | DIASTOLIC BLOOD PRESSURE: 72 MMHG | TEMPERATURE: 97.2 F | BODY MASS INDEX: 25.26 KG/M2 | WEIGHT: 180.4 LBS

## 2025-03-20 VITALS — WEIGHT: 180 LBS | BODY MASS INDEX: 25.2 KG/M2 | HEIGHT: 71 IN

## 2025-03-20 DIAGNOSIS — B35.1 DERMATOPHYTOSIS OF NAIL: ICD-10-CM

## 2025-03-20 DIAGNOSIS — M79.671 PAIN IN BOTH FEET: ICD-10-CM

## 2025-03-20 DIAGNOSIS — R73.09 ELEVATED GLUCOSE LEVEL: ICD-10-CM

## 2025-03-20 DIAGNOSIS — Z11.59 NEED FOR HEPATITIS C SCREENING TEST: ICD-10-CM

## 2025-03-20 DIAGNOSIS — I10 PRIMARY HYPERTENSION: ICD-10-CM

## 2025-03-20 DIAGNOSIS — D23.72 BENIGN NEOPLASM OF SKIN OF LOWER LIMB, INCLUDING HIP, LEFT: ICD-10-CM

## 2025-03-20 DIAGNOSIS — Z85.3 HISTORY OF BREAST CANCER: ICD-10-CM

## 2025-03-20 DIAGNOSIS — Z13.1 DIABETES MELLITUS SCREENING: ICD-10-CM

## 2025-03-20 DIAGNOSIS — D23.71 BENIGN NEOPLASM OF SKIN OF LOWER LIMB, INCLUDING HIP, RIGHT: Primary | ICD-10-CM

## 2025-03-20 DIAGNOSIS — Z12.31 ENCOUNTER FOR SCREENING MAMMOGRAM FOR MALIGNANT NEOPLASM OF BREAST: ICD-10-CM

## 2025-03-20 DIAGNOSIS — M79.672 PAIN IN BOTH FEET: ICD-10-CM

## 2025-03-20 DIAGNOSIS — I10 PRIMARY HYPERTENSION: Primary | ICD-10-CM

## 2025-03-20 DIAGNOSIS — I73.9 PVD (PERIPHERAL VASCULAR DISEASE): ICD-10-CM

## 2025-03-20 DIAGNOSIS — R26.2 TROUBLE WALKING: ICD-10-CM

## 2025-03-20 DIAGNOSIS — Z11.4 ENCOUNTER FOR SCREENING FOR HIV: ICD-10-CM

## 2025-03-20 DIAGNOSIS — M79.672 LEFT FOOT PAIN: ICD-10-CM

## 2025-03-20 LAB
ANION GAP SERPL CALCULATED.3IONS-SCNC: 9 MMOL/L (ref 9–16)
BASOPHILS # BLD: 0.04 K/UL (ref 0–0.2)
BASOPHILS NFR BLD: 1 % (ref 0–2)
BUN SERPL-MCNC: 15 MG/DL (ref 8–23)
CALCIUM SERPL-MCNC: 9.5 MG/DL (ref 8.6–10.4)
CHLORIDE SERPL-SCNC: 106 MMOL/L (ref 98–107)
CO2 SERPL-SCNC: 26 MMOL/L (ref 20–31)
CREAT SERPL-MCNC: 1 MG/DL (ref 0.6–0.9)
EOSINOPHIL # BLD: 0.13 K/UL (ref 0–0.44)
EOSINOPHILS RELATIVE PERCENT: 4 % (ref 1–4)
ERYTHROCYTE [DISTWIDTH] IN BLOOD BY AUTOMATED COUNT: 13.3 % (ref 11.8–14.4)
EST. AVERAGE GLUCOSE BLD GHB EST-MCNC: 126 MG/DL
GFR, ESTIMATED: 58 ML/MIN/1.73M2
GLUCOSE SERPL-MCNC: 88 MG/DL (ref 74–99)
HBA1C MFR BLD: 6 % (ref 4–6)
HCT VFR BLD AUTO: 36.7 % (ref 36.3–47.1)
HCV AB SERPL QL IA: NONREACTIVE
HGB BLD-MCNC: 11.5 G/DL (ref 11.9–15.1)
HIV 1+2 AB+HIV1 P24 AG SERPL QL IA: NONREACTIVE
IMM GRANULOCYTES # BLD AUTO: <0.03 K/UL (ref 0–0.3)
IMM GRANULOCYTES NFR BLD: 0 %
LYMPHOCYTES NFR BLD: 1.03 K/UL (ref 1.1–3.7)
LYMPHOCYTES RELATIVE PERCENT: 33 % (ref 24–43)
MCH RBC QN AUTO: 28.8 PG (ref 25.2–33.5)
MCHC RBC AUTO-ENTMCNC: 31.3 G/DL (ref 28.4–34.8)
MCV RBC AUTO: 92 FL (ref 82.6–102.9)
MONOCYTES NFR BLD: 0.25 K/UL (ref 0.1–1.2)
MONOCYTES NFR BLD: 8 % (ref 3–12)
NEUTROPHILS NFR BLD: 54 % (ref 36–65)
NEUTS SEG NFR BLD: 1.63 K/UL (ref 1.5–8.1)
NRBC BLD-RTO: 0 PER 100 WBC
PLATELET # BLD AUTO: 192 K/UL (ref 138–453)
PMV BLD AUTO: 11.1 FL (ref 8.1–13.5)
POTASSIUM SERPL-SCNC: 4.2 MMOL/L (ref 3.7–5.3)
RBC # BLD AUTO: 3.99 M/UL (ref 3.95–5.11)
SODIUM SERPL-SCNC: 141 MMOL/L (ref 136–145)
TSH SERPL DL<=0.05 MIU/L-ACNC: 0.51 UIU/ML (ref 0.27–4.2)
WBC OTHER # BLD: 3.1 K/UL (ref 3.5–11.3)

## 2025-03-20 PROCEDURE — 1159F MED LIST DOCD IN RCRD: CPT | Performed by: STUDENT IN AN ORGANIZED HEALTH CARE EDUCATION/TRAINING PROGRAM

## 2025-03-20 PROCEDURE — 3074F SYST BP LT 130 MM HG: CPT | Performed by: STUDENT IN AN ORGANIZED HEALTH CARE EDUCATION/TRAINING PROGRAM

## 2025-03-20 PROCEDURE — 1125F AMNT PAIN NOTED PAIN PRSNT: CPT | Performed by: STUDENT IN AN ORGANIZED HEALTH CARE EDUCATION/TRAINING PROGRAM

## 2025-03-20 PROCEDURE — 99212 OFFICE O/P EST SF 10 MIN: CPT | Performed by: STUDENT IN AN ORGANIZED HEALTH CARE EDUCATION/TRAINING PROGRAM

## 2025-03-20 PROCEDURE — 3078F DIAST BP <80 MM HG: CPT | Performed by: STUDENT IN AN ORGANIZED HEALTH CARE EDUCATION/TRAINING PROGRAM

## 2025-03-20 PROCEDURE — 1123F ACP DISCUSS/DSCN MKR DOCD: CPT | Performed by: STUDENT IN AN ORGANIZED HEALTH CARE EDUCATION/TRAINING PROGRAM

## 2025-03-20 PROCEDURE — 99213 OFFICE O/P EST LOW 20 MIN: CPT | Performed by: STUDENT IN AN ORGANIZED HEALTH CARE EDUCATION/TRAINING PROGRAM

## 2025-03-20 NOTE — PATIENT INSTRUCTIONS
Thank you for following up with us at [facility] outpatient residency clinic! It was a pleasure to meet you today!     Our plan is the following:  - Mammogram ordered  - Labs ordered   - Please call and verify your medications please.   - If you have any lightheadedness please call us and let us know. We can decrease BP medications at that point.    If you have any additional questions or concerns, please call the office ([phone]) and speak to one of the staff. They will triage and forward the message to the doctors! Have a great rest of your day!

## 2025-03-20 NOTE — PROGRESS NOTES
[facility] PODIATRY [address]  [address]  [place] [address]  Dept: [phone]  Dept Fax: [phone]     FOOT PAIN & BENIGN NEOPLASM PROGRESS NOTE  Date of patient's visit: 3/20/2025  Patient's Name:  Rosmery Ramos YOB: 1945            Patient Care Team:  Jade Heller MD as PCP - General (Family Medicine)  Jade Heller MD as PCP - Empaneled Provider  Cyrus Hutson DPM as Physician (Podiatry)          Chief Complaint   Patient presents with    Foot Pain     Bilateral feet     Nail Problem     Toenail trim     Benign Neoplasm     Bilateral feet        Subjective:   This Rosmery Ramos comes to clinic for foot and nail care.  Pt currently has complaint of thickened, painful, elongated nails that he/she cannot manage by themselves.  Pt. Relates pain to nails with shoe gear.  Pt's primary care physician is Jade Heller MDlast seen 3/20/25.  Past Medical History:   Diagnosis Date    Arthritis     Atrial fibrillation (HCC)     Cancer (HCC)     left breast cancer    Hyperlipidemia     Hypertension     Lumbar stenosis     Multiple thyroid nodules     Nonsmoker     Prediabetes     Stage 3a chronic kidney disease (CKD) (HCC)     Subclinical hyperthyroidism     SVT (supraventricular tachycardia) 2009    Vitamin D deficiency    Rosmery Ramos is a 79 y.o. female.     Patient also relates to painful lesions her feet.. They have been present for 2 month(s) duration.  The lesions are present on the bilateral foot. The patient has 4 lesion that is deep seated and has a painful core. Treatment has included pads and wider shoes that has heleped  Pt has a new complaint of none today .      Allergies   Allergen Reactions    No Known Allergies      Current Outpatient Medications on File Prior to Visit   Medication Sig Dispense Refill    amLODIPine (NORVASC) 5 MG tablet Take 1 tablet by mouth in the morning and at bedtime

## 2025-03-27 DIAGNOSIS — M25.511 RIGHT SHOULDER PAIN, UNSPECIFIED CHRONICITY: Primary | ICD-10-CM

## 2025-03-30 SDOH — HEALTH STABILITY: PHYSICAL HEALTH: ON AVERAGE, HOW MANY DAYS PER WEEK DO YOU ENGAGE IN MODERATE TO STRENUOUS EXERCISE (LIKE A BRISK WALK)?: 0 DAYS

## 2025-03-31 ENCOUNTER — TELEPHONE (OUTPATIENT)
Age: 80
End: 2025-03-31

## 2025-03-31 NOTE — TELEPHONE ENCOUNTER
Phoned patient in regards to getting x-ray taken prior to appointment with provider. Instructed to get x-ray within an hour prior to appointment at: [address]. -- The sign by the road states \"Lab and imaging services\". Order for x-ray has already been placed in system.

## 2025-04-01 ENCOUNTER — HOSPITAL ENCOUNTER (OUTPATIENT)
Age: 80
Discharge: HOME | End: 2025-04-03
Payer: MEDICARE

## 2025-04-01 ENCOUNTER — OFFICE VISIT (OUTPATIENT)
Age: 80
End: 2025-04-01

## 2025-04-01 VITALS — BODY MASS INDEX: 25.2 KG/M2 | HEIGHT: 71 IN | WEIGHT: 180 LBS

## 2025-04-01 DIAGNOSIS — M25.511 RIGHT SHOULDER PAIN, UNSPECIFIED CHRONICITY: ICD-10-CM

## 2025-04-01 DIAGNOSIS — M48.061 SPINAL STENOSIS OF LUMBAR REGION, UNSPECIFIED WHETHER NEUROGENIC CLAUDICATION PRESENT: ICD-10-CM

## 2025-04-01 DIAGNOSIS — M79.672 LEFT FOOT PAIN: Primary | ICD-10-CM

## 2025-04-01 PROCEDURE — 73030 X-RAY EXAM OF SHOULDER: CPT

## 2025-04-01 PROCEDURE — 73562 X-RAY EXAM OF KNEE 3: CPT

## 2025-04-01 RX ADMIN — METHYLPREDNISOLONE ACETATE 80 MG: 80 INJECTION, SUSPENSION INTRA-ARTICULAR; INTRALESIONAL; INTRAMUSCULAR; SOFT TISSUE at 08:49

## 2025-04-01 RX ADMIN — LIDOCAINE HYDROCHLORIDE 2 ML: 10 INJECTION, SOLUTION INFILTRATION; PERINEURAL at 08:49

## 2025-04-02 NOTE — PROGRESS NOTES
[facility] Orthopedics & Sports Medicine      [facility] PHYSICIANS [facility] ORTHOPAEDICS AND SPORTS MEDICINE  [address] [place] [address] [address]  [place] [address]  Dept: [phone]  Dept Fax: [phone]    Chief Compliant:  Chief Complaint   Patient presents with    Shoulder Pain     Right shoulder pain        History of Present Illness:  This is a 79 y.o. female who presents to the clinic today for evaluation / follow up of right shoulder pain and right knee pain.  She states that she has had these pains chronically for quite some time.  Really the right knee more than the shoulder.  The shoulder is really just flared up more significantly in last 2 weeks.  She has dull aching pain in both these joints.  The right knee does make her feel a fall risk at times.  She tried ibuprofen and Tylenol and a low impact exercise therapy program.  She is here today for further evaluation.  Her quality life is significantly affected by both these things..       Physical Exam:    On examination the right knee has crepitus to range of motion she has loss of degrees of extension flexion back to approximately 110 degrees.  No varus or valgus instability.  Intact quadricep strength tenderness along the joint line.    The right shoulder she does have some decreased range of motion she has crepitus range of motion.  She seems to have intact rotator cuff strength with pain against resistance.  There is no effusion.  Skin is normal over top.    Nursing note and vitals reviewed.     Labs and Imaging: X-rays of the right shoulder show bone-on-bone glenohumeral joint arthritis.  No other acute process.    X-rays of the right knee show tricompartmental bone-on-bone arthritis osteophytes present and subchondral sclerosis.    XR taken today:  No results found.      No orders of the defined types were placed in this encounter.      Assessment and Plan:  1. Left foot pain    2. Spinal stenosis of lumbar region,

## 2025-04-03 ENCOUNTER — HOSPITAL ENCOUNTER (OUTPATIENT)
Dept: MAMMOGRAPHY | Age: 80
Discharge: HOME | End: 2025-04-05
Attending: STUDENT IN AN ORGANIZED HEALTH CARE EDUCATION/TRAINING PROGRAM
Payer: MEDICARE

## 2025-04-03 VITALS — BODY MASS INDEX: 25.2 KG/M2 | HEIGHT: 71 IN | WEIGHT: 180 LBS

## 2025-04-03 DIAGNOSIS — Z85.3 HISTORY OF BREAST CANCER: ICD-10-CM

## 2025-04-03 DIAGNOSIS — Z12.31 ENCOUNTER FOR SCREENING MAMMOGRAM FOR MALIGNANT NEOPLASM OF BREAST: ICD-10-CM

## 2025-04-03 PROCEDURE — 77063 BREAST TOMOSYNTHESIS BI: CPT

## 2025-04-03 RX ORDER — METHYLPREDNISOLONE ACETATE 80 MG/ML
80 INJECTION, SUSPENSION INTRA-ARTICULAR; INTRALESIONAL; INTRAMUSCULAR; SOFT TISSUE ONCE
Status: COMPLETED | OUTPATIENT
Start: 2025-04-03 | End: 2025-04-01

## 2025-04-03 RX ORDER — LIDOCAINE HYDROCHLORIDE 10 MG/ML
2 INJECTION, SOLUTION INFILTRATION; PERINEURAL ONCE
Status: COMPLETED | OUTPATIENT
Start: 2025-04-03 | End: 2025-04-01

## 2025-04-14 ENCOUNTER — RESULTS FOLLOW-UP (OUTPATIENT)
Age: 80
End: 2025-04-14

## 2025-04-22 ENCOUNTER — TELEPHONE (OUTPATIENT)
Age: 80
End: 2025-04-22

## 2025-04-22 DIAGNOSIS — D72.810 LYMPHOPENIA: Primary | ICD-10-CM

## 2025-04-22 DIAGNOSIS — E04.2 MULTIPLE THYROID NODULES: ICD-10-CM

## 2025-04-22 NOTE — TELEPHONE ENCOUNTER
Please let pt know the following as she did not read her Klood message, you can call her and forward this to Klood.    - mammogram normal, repeat in a year  - Follow up with Dr Bloom her heme/oncologist  - Creatinine is stable  - CBC showed slightly low wbc, hemoglobin is slightly low as well. Will repeat this - unsure if she was sick when she got the last labs done.  - A1c is 6.0 in the prediabetes range.   - tsh normal - however I do want to make sure we follow up on her thyroid nodules and get a repeat US when she can just to make sure they are stable.  ---------------------------  For prediabetes:    - Prediabetes is a condition where blood sugar levels are higher than normal but not high enough to be diagnosed as diabetes. Managing prediabetes effectively can prevent or delay the progression to type 2 diabetes and reduce the risk of cardiovascular disease.      Lifestyle Modifications:   - Diet: Follow a healthy eating plan such as the Mediterranean or DASH diet. These diets emphasize whole grains, fruits, vegetables, lean proteins, and healthy fats. Avoid refined carbohydrates and sugary foods.    - Physical Activity: Engage in at least 150 minutes of moderate-intensity aerobic exercise per week, such as brisk walking, spread over 3-5 sessions. Include resistance training exercises targeting major muscle groups 2-3 times per week    - I could also refer her to a dietician if she is interested       Pharmacotherapy:   - If diet/exercise and dietician dont work, another option would be to start metformin      Please have her schedule an appt with me once she gets labs and us done.

## 2025-05-15 DIAGNOSIS — I12.9 HYPERTENSIVE KIDNEY DISEASE WITH STAGE 3A CHRONIC KIDNEY DISEASE (HCC): ICD-10-CM

## 2025-05-15 DIAGNOSIS — N18.31 HYPERTENSIVE KIDNEY DISEASE WITH STAGE 3A CHRONIC KIDNEY DISEASE (HCC): ICD-10-CM

## 2025-05-15 RX ORDER — OLMESARTAN MEDOXOMIL 20 MG/1
20 TABLET ORAL DAILY
Qty: 90 TABLET | Refills: 1 | Status: SHIPPED | OUTPATIENT
Start: 2025-05-15

## 2025-05-15 RX ORDER — AMLODIPINE BESYLATE 5 MG/1
TABLET ORAL
Qty: 180 TABLET | Refills: 0 | Status: CANCELLED | OUTPATIENT
Start: 2025-05-15

## 2025-05-15 NOTE — TELEPHONE ENCOUNTER
Refilled olmesartan    Is patient still taking amlodipine? In some of the notes it still says discontinued but in my last note I wrote it was discontinued but patient thinks she is still taking it.     Can you find out if she is -    If she is what dose and how many tablets?

## 2025-05-19 ENCOUNTER — TELEPHONE (OUTPATIENT)
Dept: PODIATRY | Age: 80
End: 2025-05-19

## 2025-05-19 NOTE — TELEPHONE ENCOUNTER
Spoke to patient and she states that her pharmacy already refilled the amlodipine, not sure what dosage she is on. Patient will give office a call back when she gets home and checks her medication bottle

## 2025-05-19 NOTE — TELEPHONE ENCOUNTER
SOMEONE LEFT HER A VOICE MAIL TO CHANGE HER APPT ON 05-29- WITH DR NESS, PLEASE GIVE HER A CALL BACK

## 2025-05-20 ENCOUNTER — OFFICE VISIT (OUTPATIENT)
Age: 80
End: 2025-05-20
Payer: MEDICARE

## 2025-05-20 VITALS
DIASTOLIC BLOOD PRESSURE: 80 MMHG | RESPIRATION RATE: 16 BRPM | WEIGHT: 177 LBS | HEIGHT: 71 IN | HEART RATE: 54 BPM | TEMPERATURE: 98 F | BODY MASS INDEX: 24.78 KG/M2 | SYSTOLIC BLOOD PRESSURE: 136 MMHG

## 2025-05-20 DIAGNOSIS — H60.322: ICD-10-CM

## 2025-05-20 DIAGNOSIS — Z78.9 NONSMOKER: ICD-10-CM

## 2025-05-20 DIAGNOSIS — H61.23 BILATERAL IMPACTED CERUMEN: Primary | ICD-10-CM

## 2025-05-20 PROCEDURE — 3075F SYST BP GE 130 - 139MM HG: CPT

## 2025-05-20 PROCEDURE — 3079F DIAST BP 80-89 MM HG: CPT

## 2025-05-20 PROCEDURE — 69210 REMOVE IMPACTED EAR WAX UNI: CPT

## 2025-05-20 PROCEDURE — 1123F ACP DISCUSS/DSCN MKR DOCD: CPT

## 2025-05-20 PROCEDURE — 99213 OFFICE O/P EST LOW 20 MIN: CPT

## 2025-05-20 PROCEDURE — 1126F AMNT PAIN NOTED NONE PRSNT: CPT

## 2025-05-20 RX ORDER — CIPROFLOXACIN AND DEXAMETHASONE 3; 1 MG/ML; MG/ML
4 SUSPENSION/ DROPS AURICULAR (OTIC) 2 TIMES DAILY
Qty: 7.5 ML | Refills: 0 | Status: SHIPPED | OUTPATIENT
Start: 2025-05-20 | End: 2025-05-27

## 2025-05-20 RX ORDER — AMLODIPINE BESYLATE 5 MG/1
TABLET ORAL
COMMUNITY
Start: 2025-05-15

## 2025-05-20 NOTE — PATIENT INSTRUCTIONS
Thank you for coming into the clinic today  -As discussed we performed a bilateral ear irrigation  -Your right ear is clear   -Your left ear was very sensitive and did show some redness and blood likely due to the scraping and cerumen impaction  -I will send a combination antibiotic and steroid eardrop to your pharmacy for you to use 4 drops into the left ear twice daily for the next 7 days  -If you would like to start using Debrox you can use 5 to 10 drops in the affected ear twice daily for up to 4 days to soften the earwax and have it come out otherwise do not use this medication daily and do not use it for at least the next 2 weeks  -Please feel free to send us any nonurgent medical questions through my portal or call to speak with the on-call physician for more urgent medical question

## 2025-05-20 NOTE — PROGRESS NOTES
Procedure Documentation:  Procedure:     Ear Irrigation/instrumented removal of cerumen   Location:       bilateral Ear Canals  Reason:         Impacted Wax/Cerumen  Note:             Patients ear(s) was/were irrigated with warm tap water and peroxide using Waterpik. Manual removal with cerumen curette was done by provider.   I was unable to remove the wax/cerumen completely by flushing, Doctor had to use curette   Patient tolerated the procedure.   Pt to use debrox when needed.  Pt was advised of this and given written instructions  Pt instructions: continue current home treatments    Stephanie Cali MA    
   Left CCA prox PSV 02/27/2025 76.9  cm/s Final    Left CCA prox EDV 02/27/2025 12.0  cm/s Final    Left bulb PSV 02/27/2025 60.4  cm/s Final    Left bulb EDV 02/27/2025 14.2  cm/s Final    Left ECA PSV 02/27/2025 37.7  cm/s Final    Left ECA EDV 02/27/2025 9.9  cm/s Final    Left ICA dist PSV 02/27/2025 46.0  cm/s Final    Left ICA dist EDV 02/27/2025 18.7  cm/s Final    Left ICA prox PSV 02/27/2025 67.0  cm/s Final    Left ICA prox EDV 02/27/2025 16.4  cm/s Final    Left vertebral PSV 02/27/2025 53.8  cm/s Final    Left vertebral EDV 02/27/2025 20.8  cm/s Final    Left ICA/CCA PSV 02/27/2025 1.02  no units Final        PHYSICAL EXAM     /80   Pulse 54   Temp 98 °F (36.7 °C) (Oral)   Resp 16   Ht 1.803 m (5' 11\")   Wt 80.3 kg (177 lb)   BMI 24.69 kg/m²      Physical Exam  Vitals reviewed.   Constitutional:       General: She is not in acute distress.     Appearance: Normal appearance. She is not ill-appearing, toxic-appearing or diaphoretic.   HENT:      Head: Normocephalic and atraumatic.      Right Ear: Tympanic membrane, ear canal and external ear normal. There is no impacted cerumen.      Left Ear: There is impacted cerumen.      Ears:      Comments: After bilateral ear irrigation the right tympanic membrane was normal.  The left ear still showed cerumen with some bleeding noted in the ear canal with increased sensitivity      Nose: Nose normal.      Mouth/Throat:      Mouth: Mucous membranes are moist.   Eyes:      Extraocular Movements: Extraocular movements intact.   Cardiovascular:      Rate and Rhythm: Normal rate and regular rhythm.   Pulmonary:      Effort: Pulmonary effort is normal.   Musculoskeletal:         General: Normal range of motion.      Cervical back: Normal range of motion and neck supple.   Skin:     General: Skin is warm and dry.   Neurological:      General: No focal deficit present.      Mental Status: She is alert and oriented to person, place, and time. Mental status is at

## 2025-06-26 ENCOUNTER — OFFICE VISIT (OUTPATIENT)
Dept: PODIATRY | Age: 80
End: 2025-06-26
Payer: MEDICARE

## 2025-06-26 VITALS — HEIGHT: 71 IN | WEIGHT: 269 LBS | BODY MASS INDEX: 37.66 KG/M2

## 2025-06-26 DIAGNOSIS — M79.676 PAIN OF FIFTH TOE: ICD-10-CM

## 2025-06-26 DIAGNOSIS — R26.2 TROUBLE WALKING: ICD-10-CM

## 2025-06-26 DIAGNOSIS — D23.72 BENIGN NEOPLASM OF SKIN OF LOWER LIMB, INCLUDING HIP, LEFT: ICD-10-CM

## 2025-06-26 DIAGNOSIS — B35.1 DERMATOPHYTOSIS OF NAIL: ICD-10-CM

## 2025-06-26 DIAGNOSIS — D23.71 BENIGN NEOPLASM OF SKIN OF LOWER LIMB, INCLUDING HIP, RIGHT: Primary | ICD-10-CM

## 2025-06-26 DIAGNOSIS — M79.672 LEFT FOOT PAIN: ICD-10-CM

## 2025-06-26 DIAGNOSIS — M20.5X1 ACQUIRED DIGITI QUINTI VARUS DEFORMITY OF RIGHT FOOT: ICD-10-CM

## 2025-06-26 DIAGNOSIS — I73.9 PVD (PERIPHERAL VASCULAR DISEASE): ICD-10-CM

## 2025-06-26 DIAGNOSIS — M79.672 PAIN IN BOTH FEET: ICD-10-CM

## 2025-06-26 DIAGNOSIS — M79.671 PAIN IN BOTH FEET: ICD-10-CM

## 2025-06-26 DIAGNOSIS — M79.671 RIGHT FOOT PAIN: ICD-10-CM

## 2025-06-26 DIAGNOSIS — M20.42 ACQUIRED HAMMERTOE OF LEFT FOOT: ICD-10-CM

## 2025-06-26 PROCEDURE — 11721 DEBRIDE NAIL 6 OR MORE: CPT | Performed by: PODIATRIST

## 2025-06-26 PROCEDURE — 17110 DESTRUCTION B9 LES UP TO 14: CPT | Performed by: PODIATRIST

## 2025-06-27 ENCOUNTER — TELEPHONE (OUTPATIENT)
Age: 80
End: 2025-06-27

## 2025-06-27 DIAGNOSIS — H61.23 BILATERAL IMPACTED CERUMEN: Primary | ICD-10-CM

## 2025-06-27 DIAGNOSIS — R43.0 ANOSMIA: ICD-10-CM

## 2025-06-27 NOTE — TELEPHONE ENCOUNTER
Patient picked up the following at her health store and wants to know if it is okay to take with her current meds she is on.     -Cell Salt #7  - L-Optizinc 30 mg   -Monolaurin     Please advise

## 2025-07-01 NOTE — TELEPHONE ENCOUNTER
Jorge Luis Heller!  Here's what I found:    1)  Cell Salt #7:  This is actually a homeopathic medication that is 6 times diluted Inko Sulphuricum.  I can't find much of any information at all on this.  It's not in [employer] [facility] which is my go to resource.  I imagine that given it's diluted, it wouldn't cause much of an issue.  It may contain potassium although it's really hard to determine anything about this supplement.    2)  L-Optizinc is just zinc and copper so as long as you are good with her taking those, it should be ok.   Looks like the max dose according to [facility] is 40 mg per day and this is a 30 mg dose, so would not want her taking two. Coincidentally when I looked this up to see drug interactions with Zinc, I saw that Zinc can reduce the absorption of copper.  Seems suspect that they are made together here.    3)  Monolaurin:  Very little information on this as well.  Does not appear to have drug interactions, and is listed as \"likely safe\".      As with any supplements, I always advise that they aren't studied as fully as FDA approved medications and could have drug interactions or side effects yet to be identified.    Hope that helps!  Isa    For Pharmacy Admin Tracking Only    Program: Medical Group  CPA in place:  No  Recommendation Provided To: Provider: 1 via Note to Provider  Intervention Detail: Adherence Monitorin  Intervention Accepted By: Provider: 1  Gap Closed?: No   Time Spent (min): 20

## 2025-07-02 NOTE — TELEPHONE ENCOUNTER
Please let patient know the following upon collaboration with our pharmacist ---    1)  Cell Salt #7:    - a homeopathic medication that is 6 times diluted Niko Sulphuricum.    - Dr LAURA rodriges couldn't find much of any information at all on this.   -  It's not in [employer] which is our go to resource.   - It may contain potassium although it's really hard to determine anything about this supplement.     2)  L-Optizinc is just zinc and copper - would only recommend one if she takes it however Zinc can reduce the absorption of copper which is weird that they are made together here.     3)  Monolaurin:  Very little information on this as well.  Does not appear to have drug interactions, and is listed as \"likely safe\".       As with any supplements, I always advise that they aren't studied as fully as FDA approved medications and could have drug interactions or side effects yet to be identified.     I would rather her take a multivitamin over the counter than these random supplements but she can bring them in at her next appt and talk about them more if she would like

## 2025-07-02 NOTE — TELEPHONE ENCOUNTER
Referral to go out in mail to patient. Called pt left vm, please give her the info of ENT if she calls back, thanks.

## 2025-07-02 NOTE — TELEPHONE ENCOUNTER
She told me she lost her sense of taste and smell after she had covid but did not tell me it was still going on. Will give her a referral. If it's still going on she should definitely see ENT. Please let her know I added this in there. She can schedule with anyone in their office.    Isa - did she need to see one of the attendings sooner than scheduled appt for her ear concern? What symptoms was she having?    Isa rn told me  she had a syncopal episode the day she had the irrigation of her ear and ems said it was because of the irrigation? She has a history of vision changes/dizziness that I recommended er for in the past but she was going to [place] and she didn't go and she subsequently saw dr piedra for f/u. She also has a cardiac history with svt and follows with cardio.

## 2025-07-02 NOTE — TELEPHONE ENCOUNTER
The information below was given to the patient.  She has been taking these for the last 5 days, with no side effects. She expressed that she has had no sense of taste or smell for the last 4 years and was feeling very frustrated.  I made appointment for the patient 3 month check up.  She is also still experiencing issues with her ears and after Rx treament per last visit would like a referral to ENT.  Please place order so patient may set appointment.  I explained that we would call her next week with referral info.

## 2025-07-03 NOTE — TELEPHONE ENCOUNTER
Left message for patient to call back.  Referral to Dr Jossy Morris -[phone].  Please ask patient how she is feeling and what sx she is having.  May need to be seen sooner.  See message from Dr Heller below.

## 2025-07-03 NOTE — TELEPHONE ENCOUNTER
Patient called back and was informed of ENT referral. Patient states she has an appt with ENT DR Morris next Thursday.  Patient states she is still having intermittent decrease hearing at times. Sx of dizziness is better.  She will go to ER if sx worsen before her appt with ENT and call us if she needs to be seen sooner. Dr haile in office and notified of the above

## 2025-07-08 NOTE — PROGRESS NOTES
1st MPJ ROM decreased, Bilateral.  Muscle strength 5/5, Bilateral.  Pain present upon palpation of toenails 1-5, Bilateral. decreased medial longitudinal arch, Bilateral.  Ankle ROM decreased,Bilateral.    Dorsally contracted digits present digits 2, Bilateral.     Vascular: DP pulses 1/4 bilateral.  PT pulses 0/4 bilateral.   CFT <5 seconds, Bilateral.  Hair growth absent to the level of the digits, Bilateral.  Edema present, Bilateral.  Varicosities absent, Bilateral. Erythema absent, Bilateral    Neurological: Sensation diminshed to light touch to level of digits, Bilateral.  Protective sensation intact 6/10 sites via 5.07/10g [place]-You Monofilament, Bilateral.  negative Tinel's, Bilateral.  negative Valleix sign, Bilateral.      Integument: Warm, dry, supple, Bilateral.  Open lesion absent, Bilateral.  Interdigital maceration absent to web spaces 4, Bilateral.  Nails 1-5 left and 1-5 right thickened > 3.0 mm, dystrophic and crumbly, discolored with subungual debris.  Fissures absent, Bilateral.   General: AAO x 3 in NAD.    Derm  Toenail Description  Sites of Onychomycosis Involvement (Check affected area)  [x] [x] [x] [x] [x] [x] [x] [x] [x] [x]  5 4 3 2 1 1 2 3 4 5                          Right                                        Left    Thickness  [x] [x] [x] [x] [x] [x] [x] [x] [x] [x]  5 4 3 2 1 1 2 3 4 5                         Right                                        Left    Dystrophic Changes   [x] [x] [x] [x] [x] [x] [x] [x] [x] [x]  5 4 3 2 1 1 2 3 4 5                         Right                                        Left    Color  [x] [x] [x] [x] [x] [x] [x] [x] [x] [x]  5 4 3 2 1 1 2 3 4 5                          Right                                        Left    Incurvation/Ingrowin   [] [] [] [] [] [] [] [] [] []  5 4 3 2 1 1 2 3 4 5                         Right                                        Left    Inflammation/Pain   [x] [x] [x] [x] [x] [x] [x] [x] [x] [x]  5 4 3

## 2025-07-16 ENCOUNTER — TELEPHONE (OUTPATIENT)
Age: 80
End: 2025-07-16

## 2025-07-16 NOTE — PROGRESS NOTES
[facility]  [address] [place] [address]  Phone: [phone]  Fax: [phone]      Date of Visit:  2025  Patient Name: Rosmery Ramos   Patient :  1945     HPI:     Rosmery Ramos is a 79 y.o. female who presents today to discuss   Chief Complaint   Patient presents with    Hypertension     gdo    Hypothyroidism    Hyperlipidemia     Patient is here to follow up on chronic conditions.  History of Present Illness  The patient presents for evaluation of loss of smell and taste, earwax buildup, peripheral vascular disease, osteoporosis, and health maintenance.    She reports a persistent loss of smell and taste for the past 4 years. She is unable to identify food by its smell or taste. She has discontinued the use of monolaurin, zinc, and copper supplements.  She recently had a large amount of earwax removed from one ear by an ENT specialist, which was causing her discomfort. The other ear was not completely cleared. She does not use headphones. She recalls that in her 20s, a nurse friend mentioned that her earwax did not naturally fall out like most people's, necessitating regular ear cleaning. She was advised to use hydrogen peroxide washes by the ENT specialist. She forgot to talk about the loss of smell/taste with ent which is what she was referred for, she will call and make an appointment. She reports this started after COVID.    She had a stress test conducted by Dr. Hernandez a few months ago which was negative per pt. She is currently on Eliquis.    She had a consultation with a podiatrist last week who diagnosed her with arthritis in her foot. She has never smoked cigarettes. She uses an arthritis cream for pain management but finds it ineffective.    She has been diagnosed with osteoporosis and is under the care of Dr. Bloom. She is on alendronate for this condition.     Tobacco: She has never smoked cigarettes.    FAMILY

## 2025-07-17 ENCOUNTER — OFFICE VISIT (OUTPATIENT)
Age: 80
End: 2025-07-17
Payer: MEDICARE

## 2025-07-17 VITALS
DIASTOLIC BLOOD PRESSURE: 54 MMHG | WEIGHT: 178.2 LBS | BODY MASS INDEX: 24.95 KG/M2 | TEMPERATURE: 97.4 F | SYSTOLIC BLOOD PRESSURE: 108 MMHG | RESPIRATION RATE: 12 BRPM | HEART RATE: 75 BPM | HEIGHT: 71 IN

## 2025-07-17 DIAGNOSIS — I48.0 PAROXYSMAL A-FIB (HCC): ICD-10-CM

## 2025-07-17 DIAGNOSIS — Z85.3 HISTORY OF BREAST CANCER: ICD-10-CM

## 2025-07-17 DIAGNOSIS — E04.2 MULTIPLE THYROID NODULES: ICD-10-CM

## 2025-07-17 DIAGNOSIS — D72.810 LYMPHOPENIA: ICD-10-CM

## 2025-07-17 DIAGNOSIS — I47.10 SVT (SUPRAVENTRICULAR TACHYCARDIA): ICD-10-CM

## 2025-07-17 DIAGNOSIS — I73.9 PVD (PERIPHERAL VASCULAR DISEASE): Primary | ICD-10-CM

## 2025-07-17 DIAGNOSIS — R73.9 HYPERGLYCEMIA: ICD-10-CM

## 2025-07-17 DIAGNOSIS — N18.31 STAGE 3A CHRONIC KIDNEY DISEASE (HCC): ICD-10-CM

## 2025-07-17 DIAGNOSIS — E78.5 DYSLIPIDEMIA: ICD-10-CM

## 2025-07-17 DIAGNOSIS — L73.8 SEBACEOUS HYPERPLASIA: ICD-10-CM

## 2025-07-17 PROCEDURE — 99214 OFFICE O/P EST MOD 30 MIN: CPT | Performed by: STUDENT IN AN ORGANIZED HEALTH CARE EDUCATION/TRAINING PROGRAM

## 2025-07-17 PROCEDURE — 3078F DIAST BP <80 MM HG: CPT | Performed by: STUDENT IN AN ORGANIZED HEALTH CARE EDUCATION/TRAINING PROGRAM

## 2025-07-17 PROCEDURE — 1123F ACP DISCUSS/DSCN MKR DOCD: CPT | Performed by: STUDENT IN AN ORGANIZED HEALTH CARE EDUCATION/TRAINING PROGRAM

## 2025-07-17 PROCEDURE — 3074F SYST BP LT 130 MM HG: CPT | Performed by: STUDENT IN AN ORGANIZED HEALTH CARE EDUCATION/TRAINING PROGRAM

## 2025-07-17 PROCEDURE — 1159F MED LIST DOCD IN RCRD: CPT | Performed by: STUDENT IN AN ORGANIZED HEALTH CARE EDUCATION/TRAINING PROGRAM

## 2025-07-17 NOTE — PATIENT INSTRUCTIONS
1)  Cell Salt #7:  This is actually a homeopathic medication that is 6 times diluted Niko Sulphuricum.  I can't find much of any information at all on this.  It's not in [employer] which is my go to resource.  I imagine that given it's diluted, it wouldn't cause much of an issue.  It may contain potassium although it's really hard to determine anything about this supplement.     2)  L-Optizinc is just zinc and copper so as long as you are good with her taking those, it should be ok.   Looks like the max dose according to [employer] [facility] is 40 mg per day and this is a 30 mg dose, so would not want her taking two. Coincidentally when I looked this up to see drug interactions with Zinc, I saw that Zinc can reduce the absorption of copper.  Seems suspect that they are made together here.     3)  Monolaurin:  Very little information on this as well.  Does not appear to have drug interactions, and is listed as \"likely safe\".        Treatment Plan:  Loss of smell and taste: Consult with Dr. Nelson or Dr. Thomas.     Peripheral vascular disease: A vascular study will be ordered.    Osteoporosis: Continue taking alendronate, calcium, and vitamin D supplements. Monitor calcium levels.  Health maintenance: Blood pressure readings are normal. Repeat thyroid ultrasound recommended due to thyroid nodules. Lab tests to monitor kidney function and other parameters.

## 2025-07-29 ASSESSMENT — ENCOUNTER SYMPTOMS
COUGH: 0
SHORTNESS OF BREATH: 0
NAUSEA: 0
ABDOMINAL PAIN: 0
VOMITING: 0

## 2025-07-31 ENCOUNTER — TELEPHONE (OUTPATIENT)
Age: 80
End: 2025-07-31

## 2025-07-31 DIAGNOSIS — L73.8 SEBACEOUS HYPERPLASIA: Primary | ICD-10-CM

## 2025-08-04 ENCOUNTER — TRANSCRIBE ORDERS (OUTPATIENT)
Dept: ADMINISTRATIVE | Age: 80
End: 2025-08-04

## 2025-08-04 DIAGNOSIS — Z12.31 ENCOUNTER FOR SCREENING MAMMOGRAM FOR MALIGNANT NEOPLASM OF BREAST: Primary | ICD-10-CM

## 2025-08-08 ENCOUNTER — HOSPITAL ENCOUNTER (OUTPATIENT)
Dept: VASCULAR LAB | Age: 80
Discharge: HOME | End: 2025-08-10
Attending: STUDENT IN AN ORGANIZED HEALTH CARE EDUCATION/TRAINING PROGRAM
Payer: MEDICARE

## 2025-08-08 ENCOUNTER — HOSPITAL ENCOUNTER (OUTPATIENT)
Dept: ULTRASOUND IMAGING | Age: 80
Discharge: HOME | End: 2025-08-10
Attending: STUDENT IN AN ORGANIZED HEALTH CARE EDUCATION/TRAINING PROGRAM
Payer: MEDICARE

## 2025-08-08 ENCOUNTER — HOSPITAL ENCOUNTER (OUTPATIENT)
Dept: LAB | Age: 80
Discharge: HOME | End: 2025-08-08
Attending: STUDENT IN AN ORGANIZED HEALTH CARE EDUCATION/TRAINING PROGRAM
Payer: MEDICARE

## 2025-08-08 ENCOUNTER — RESULTS FOLLOW-UP (OUTPATIENT)
Age: 80
End: 2025-08-08

## 2025-08-08 DIAGNOSIS — E78.5 DYSLIPIDEMIA: ICD-10-CM

## 2025-08-08 DIAGNOSIS — E04.2 MULTIPLE THYROID NODULES: Primary | ICD-10-CM

## 2025-08-08 DIAGNOSIS — R73.9 HYPERGLYCEMIA: ICD-10-CM

## 2025-08-08 DIAGNOSIS — I73.9 PVD (PERIPHERAL VASCULAR DISEASE): ICD-10-CM

## 2025-08-08 DIAGNOSIS — E04.2 MULTIPLE THYROID NODULES: ICD-10-CM

## 2025-08-08 DIAGNOSIS — N18.31 STAGE 3A CHRONIC KIDNEY DISEASE (HCC): ICD-10-CM

## 2025-08-08 DIAGNOSIS — D72.810 LYMPHOPENIA: ICD-10-CM

## 2025-08-08 LAB
ANION GAP SERPL CALCULATED.3IONS-SCNC: 10 MMOL/L (ref 9–16)
BASOPHILS # BLD: 0.05 K/UL (ref 0–0.2)
BASOPHILS NFR BLD: 2 % (ref 0–2)
BUN SERPL-MCNC: 15 MG/DL (ref 8–23)
CALCIUM SERPL-MCNC: 9.6 MG/DL (ref 8.6–10.4)
CHLORIDE SERPL-SCNC: 109 MMOL/L (ref 98–107)
CHOLEST SERPL-MCNC: 137 MG/DL (ref 0–199)
CHOLESTEROL/HDL RATIO: 2.2
CO2 SERPL-SCNC: 25 MMOL/L (ref 20–31)
CREAT SERPL-MCNC: 1.1 MG/DL (ref 0.6–0.9)
EOSINOPHIL # BLD: 0.21 K/UL (ref 0–0.44)
EOSINOPHILS RELATIVE PERCENT: 6 % (ref 1–4)
ERYTHROCYTE [DISTWIDTH] IN BLOOD BY AUTOMATED COUNT: 13.1 % (ref 11.8–14.4)
EST. AVERAGE GLUCOSE BLD GHB EST-MCNC: 120 MG/DL
GFR, ESTIMATED: 51 ML/MIN/1.73M2
GLUCOSE SERPL-MCNC: 75 MG/DL (ref 74–99)
HBA1C MFR BLD: 5.8 % (ref 4–6)
HCT VFR BLD AUTO: 36.2 % (ref 36.3–47.1)
HDLC SERPL-MCNC: 63 MG/DL
HGB BLD-MCNC: 11.4 G/DL (ref 11.9–15.1)
IMM GRANULOCYTES # BLD AUTO: <0.03 K/UL (ref 0–0.3)
IMM GRANULOCYTES NFR BLD: 0 %
LDLC SERPL CALC-MCNC: 60 MG/DL (ref 0–100)
LYMPHOCYTES NFR BLD: 1.03 K/UL (ref 1.1–3.7)
LYMPHOCYTES RELATIVE PERCENT: 30 % (ref 24–43)
MCH RBC QN AUTO: 29.8 PG (ref 25.2–33.5)
MCHC RBC AUTO-ENTMCNC: 31.5 G/DL (ref 28.4–34.8)
MCV RBC AUTO: 94.5 FL (ref 82.6–102.9)
MONOCYTES NFR BLD: 0.37 K/UL (ref 0.1–1.2)
MONOCYTES NFR BLD: 11 % (ref 3–12)
NEUTROPHILS NFR BLD: 51 % (ref 36–65)
NEUTS SEG NFR BLD: 1.76 K/UL (ref 1.5–8.1)
NRBC BLD-RTO: 0 PER 100 WBC
PLATELET # BLD AUTO: 186 K/UL (ref 138–453)
PMV BLD AUTO: 11.7 FL (ref 8.1–13.5)
POTASSIUM SERPL-SCNC: 4.3 MMOL/L (ref 3.7–5.3)
RBC # BLD AUTO: 3.83 M/UL (ref 3.95–5.11)
SODIUM SERPL-SCNC: 144 MMOL/L (ref 136–145)
TRIGL SERPL-MCNC: 69 MG/DL
VAS LEFT ABI: 1.42
VAS LEFT ANKLE BP: 196 MMHG
VAS LEFT ARM BP: 138 MMHG
VAS LEFT DORSALIS PEDIS BP: 196 MMHG
VAS LEFT PTA BP: 176 MMHG
VAS LEFT TBI: 1.26
VAS LEFT TOE PRESSURE: 174 MMHG
VAS RIGHT ABI: 1.41
VAS RIGHT ANKLE BP: 194 MMHG
VAS RIGHT ARM BP: 127 MMHG
VAS RIGHT DORSALIS PEDIS BP: 180 MMHG
VAS RIGHT PTA BP: 194 MMHG
VAS RIGHT TBI: 1.04
VAS RIGHT TOE PRESSURE: 144 MMHG
VLDLC SERPL CALC-MCNC: 14 MG/DL (ref 1–30)
WBC OTHER # BLD: 3.4 K/UL (ref 3.5–11.3)

## 2025-08-08 PROCEDURE — 80048 BASIC METABOLIC PNL TOTAL CA: CPT

## 2025-08-08 PROCEDURE — 80061 LIPID PANEL: CPT

## 2025-08-08 PROCEDURE — 83036 HEMOGLOBIN GLYCOSYLATED A1C: CPT

## 2025-08-08 PROCEDURE — 93922 UPR/L XTREMITY ART 2 LEVELS: CPT

## 2025-08-08 PROCEDURE — 36415 COLL VENOUS BLD VENIPUNCTURE: CPT

## 2025-08-08 PROCEDURE — 93922 UPR/L XTREMITY ART 2 LEVELS: CPT | Performed by: SURGERY

## 2025-08-08 PROCEDURE — 85025 COMPLETE CBC W/AUTO DIFF WBC: CPT

## 2025-08-08 PROCEDURE — 76536 US EXAM OF HEAD AND NECK: CPT

## 2025-08-25 ENCOUNTER — RESULTS FOLLOW-UP (OUTPATIENT)
Age: 80
End: 2025-08-25

## 2025-08-25 DIAGNOSIS — D72.818 OTHER DECREASED WHITE BLOOD CELL (WBC) COUNT: ICD-10-CM

## 2025-08-25 DIAGNOSIS — N18.31 STAGE 3A CHRONIC KIDNEY DISEASE (HCC): Primary | ICD-10-CM

## (undated) DEVICE — SUTURE MCRYL SZ 2-0 L27IN ABSRB VLT SH L26MM 1/2 CIR TAPR Y317H

## (undated) DEVICE — SKIN PREP TRAY W/CHG: Brand: MEDLINE INDUSTRIES, INC.

## (undated) DEVICE — BLADE SURG MICRO ZIM 5.5X0.38 X25MM DISP

## (undated) DEVICE — GLOVE SURG SZ 65 CRM LTX FREE POLYISOPRENE POLYMER BEAD ANTI

## (undated) DEVICE — INTENDED FOR TISSUE SEPARATION, AND OTHER PROCEDURES THAT REQUIRE A SHARP SURGICAL BLADE TO PUNCTURE OR CUT.: Brand: BARD-PARKER ® CARBON RIB-BACK BLADES

## (undated) DEVICE — Device

## (undated) DEVICE — ANGIOGRAPHIC CATHETER: Brand: EXPO™

## (undated) DEVICE — SUTURE ETHLN SZ 4-0 L18IN NONABSORBABLE BLK L19MM PS-2 3/8 1667H

## (undated) DEVICE — CATHETER 5FR JR4 CORDIS 100CM

## (undated) DEVICE — PRECISION THIN (5.5 X 0.38 X 11.5MM)

## (undated) DEVICE — YANKAUER,FLEXIBLE HANDLE,REGLR CAPACITY: Brand: MEDLINE INDUSTRIES, INC.

## (undated) DEVICE — SURGICAL PROCEDURE TRAY CRD CATH SVMMC

## (undated) DEVICE — SMALL TEAR CROSS CUT RASP (11.0 X 5.0MM)

## (undated) DEVICE — SOLUTION PREP POVIDONE IOD FOR SKIN MUCOUS MEM PRIOR TO

## (undated) DEVICE — BLADE OPHTH ORNG GRINDLESS SMALLER ALTERNATIVE TO NO15 GEN

## (undated) DEVICE — NEEDLE HYPO 25GA L1.5IN BLU POLYPR HUB S STL REG BVL STR

## (undated) DEVICE — GLIDESHEATH BASIC HYDROPHILIC COATED INTRODUCER SHEATH: Brand: GLIDESHEATH

## (undated) DEVICE — APPLICATOR MEDICATED 26 CC SOLUTION HI LT ORNG CHLORAPREP

## (undated) DEVICE — TOURNIQUET CUF BLD PRESSURE 4X18 IN 2 PRT SINGLE BLDR RED

## (undated) DEVICE — PADDING,UNDERCAST,COTTON, 4"X4YD STERILE: Brand: MEDLINE

## (undated) DEVICE — CATHETER 5FR JL3.5 CORDIS 100CM

## (undated) DEVICE — STOCKINETTE ORTH 4X900IN OFF WHT TBLR UNBLEACHED FAB HLLW

## (undated) DEVICE — SOLUTION IV IRRIG 500ML 0.9% SODIUM CHL 2F7123

## (undated) DEVICE — GLOVE SURG SZ 75 CRM LTX FREE POLYISOPRENE POLYMER BEAD ANTI

## (undated) DEVICE — GLOVE SURG SZ 8 CRM LTX FREE POLYISOPRENE POLYMER BEAD ANTI

## (undated) DEVICE — BAND COMPR L24CM REG CLR PLAS HEMSTAT EXT HK AND LOOP RETEN

## (undated) DEVICE — GLOVE SURG SZ 7 CRM LTX FREE POLYISOPRENE POLYMER BEAD ANTI